# Patient Record
Sex: FEMALE | Race: WHITE | NOT HISPANIC OR LATINO | Employment: OTHER | URBAN - METROPOLITAN AREA
[De-identification: names, ages, dates, MRNs, and addresses within clinical notes are randomized per-mention and may not be internally consistent; named-entity substitution may affect disease eponyms.]

---

## 2023-11-02 ENCOUNTER — HOSPITAL ENCOUNTER (OUTPATIENT)
Facility: HOSPITAL | Age: 83
Setting detail: OBSERVATION
Discharge: NURSING, REHAB OR LONG-TERM CARE | End: 2023-11-02
Attending: EMERGENCY MEDICINE | Admitting: EMERGENCY MEDICINE
Payer: MEDICARE

## 2023-11-02 VITALS
RESPIRATION RATE: 18 BRPM | OXYGEN SATURATION: 98 % | BODY MASS INDEX: 22.82 KG/M2 | WEIGHT: 124 LBS | HEART RATE: 87 BPM | DIASTOLIC BLOOD PRESSURE: 53 MMHG | HEIGHT: 62 IN | SYSTOLIC BLOOD PRESSURE: 134 MMHG | TEMPERATURE: 98.4 F

## 2023-11-02 DIAGNOSIS — E11.39 TYPE 2 DIABETES MELLITUS WITH OTHER OPHTHALMIC COMPLICATION, UNSPECIFIED WHETHER LONG TERM INSULIN USE: ICD-10-CM

## 2023-11-02 DIAGNOSIS — H54.60 MONOCULAR VISION LOSS: Primary | ICD-10-CM

## 2023-11-02 PROBLEM — H54.62 VISION LOSS OF LEFT EYE: Status: ACTIVE | Noted: 2023-11-02

## 2023-11-02 LAB
ALBUMIN SERPL-MCNC: 4.3 G/DL (ref 3.5–5.2)
ALBUMIN/GLOB SERPL: 1.7 G/DL
ALP SERPL-CCNC: 70 U/L (ref 39–117)
ALT SERPL W P-5'-P-CCNC: 11 U/L (ref 1–33)
ANION GAP SERPL CALCULATED.3IONS-SCNC: 10.1 MMOL/L (ref 5–15)
AST SERPL-CCNC: 22 U/L (ref 1–32)
BASOPHILS # BLD AUTO: 0.07 10*3/MM3 (ref 0–0.2)
BASOPHILS NFR BLD AUTO: 0.8 % (ref 0–1.5)
BILIRUB SERPL-MCNC: 0.5 MG/DL (ref 0–1.2)
BUN SERPL-MCNC: 11 MG/DL (ref 8–23)
BUN/CREAT SERPL: 13.9 (ref 7–25)
CALCIUM SPEC-SCNC: 9.5 MG/DL (ref 8.6–10.5)
CHLORIDE SERPL-SCNC: 104 MMOL/L (ref 98–107)
CO2 SERPL-SCNC: 26.9 MMOL/L (ref 22–29)
CREAT SERPL-MCNC: 0.79 MG/DL (ref 0.57–1)
DEPRECATED RDW RBC AUTO: 42.7 FL (ref 37–54)
EGFRCR SERPLBLD CKD-EPI 2021: 75.3 ML/MIN/1.73
EOSINOPHIL # BLD AUTO: 0.19 10*3/MM3 (ref 0–0.4)
EOSINOPHIL NFR BLD AUTO: 2.1 % (ref 0.3–6.2)
ERYTHROCYTE [DISTWIDTH] IN BLOOD BY AUTOMATED COUNT: 13.1 % (ref 12.3–15.4)
GEN 5 2HR TROPONIN T REFLEX: 25 NG/L
GLOBULIN UR ELPH-MCNC: 2.5 GM/DL
GLUCOSE BLDC GLUCOMTR-MCNC: 225 MG/DL (ref 70–130)
GLUCOSE BLDC GLUCOMTR-MCNC: 295 MG/DL (ref 70–130)
GLUCOSE SERPL-MCNC: 150 MG/DL (ref 65–99)
HCT VFR BLD AUTO: 35.8 % (ref 34–46.6)
HGB BLD-MCNC: 11.7 G/DL (ref 12–15.9)
IMM GRANULOCYTES # BLD AUTO: 0.03 10*3/MM3 (ref 0–0.05)
IMM GRANULOCYTES NFR BLD AUTO: 0.3 % (ref 0–0.5)
LYMPHOCYTES # BLD AUTO: 1.83 10*3/MM3 (ref 0.7–3.1)
LYMPHOCYTES NFR BLD AUTO: 20.6 % (ref 19.6–45.3)
MCH RBC QN AUTO: 29.5 PG (ref 26.6–33)
MCHC RBC AUTO-ENTMCNC: 32.7 G/DL (ref 31.5–35.7)
MCV RBC AUTO: 90.2 FL (ref 79–97)
MONOCYTES # BLD AUTO: 0.58 10*3/MM3 (ref 0.1–0.9)
MONOCYTES NFR BLD AUTO: 6.5 % (ref 5–12)
NEUTROPHILS NFR BLD AUTO: 6.17 10*3/MM3 (ref 1.7–7)
NEUTROPHILS NFR BLD AUTO: 69.7 % (ref 42.7–76)
NRBC BLD AUTO-RTO: 0 /100 WBC (ref 0–0.2)
PLATELET # BLD AUTO: 187 10*3/MM3 (ref 140–450)
PMV BLD AUTO: 11.6 FL (ref 6–12)
POTASSIUM SERPL-SCNC: 4.1 MMOL/L (ref 3.5–5.2)
PROT SERPL-MCNC: 6.8 G/DL (ref 6–8.5)
QT INTERVAL: 387 MS
QTC INTERVAL: 444 MS
RBC # BLD AUTO: 3.97 10*6/MM3 (ref 3.77–5.28)
SODIUM SERPL-SCNC: 141 MMOL/L (ref 136–145)
TROPONIN T DELTA: 1 NG/L
TROPONIN T SERPL HS-MCNC: 24 NG/L
WBC NRBC COR # BLD: 8.87 10*3/MM3 (ref 3.4–10.8)

## 2023-11-02 PROCEDURE — 82948 REAGENT STRIP/BLOOD GLUCOSE: CPT

## 2023-11-02 PROCEDURE — 63710000001 METFORMIN 500 MG TABLET: Performed by: PHYSICIAN ASSISTANT

## 2023-11-02 PROCEDURE — 80053 COMPREHEN METABOLIC PANEL: CPT | Performed by: PHYSICIAN ASSISTANT

## 2023-11-02 PROCEDURE — 36415 COLL VENOUS BLD VENIPUNCTURE: CPT

## 2023-11-02 PROCEDURE — 99284 EMERGENCY DEPT VISIT MOD MDM: CPT

## 2023-11-02 PROCEDURE — 63710000001 INSULIN GLARGINE PER 5 UNITS: Performed by: PHYSICIAN ASSISTANT

## 2023-11-02 PROCEDURE — A9270 NON-COVERED ITEM OR SERVICE: HCPCS | Performed by: PHYSICIAN ASSISTANT

## 2023-11-02 PROCEDURE — 85025 COMPLETE CBC W/AUTO DIFF WBC: CPT | Performed by: PHYSICIAN ASSISTANT

## 2023-11-02 PROCEDURE — G0378 HOSPITAL OBSERVATION PER HR: HCPCS

## 2023-11-02 PROCEDURE — 84484 ASSAY OF TROPONIN QUANT: CPT | Performed by: PHYSICIAN ASSISTANT

## 2023-11-02 PROCEDURE — 93010 ELECTROCARDIOGRAM REPORT: CPT | Performed by: INTERNAL MEDICINE

## 2023-11-02 PROCEDURE — 63710000001 MEMANTINE 10 MG TABLET: Performed by: PHYSICIAN ASSISTANT

## 2023-11-02 PROCEDURE — 93005 ELECTROCARDIOGRAM TRACING: CPT | Performed by: PHYSICIAN ASSISTANT

## 2023-11-02 PROCEDURE — 63710000001 HYDRALAZINE 50 MG TABLET: Performed by: PHYSICIAN ASSISTANT

## 2023-11-02 PROCEDURE — 63710000001 DONEPEZIL 10 MG TABLET: Performed by: PHYSICIAN ASSISTANT

## 2023-11-02 PROCEDURE — 63710000001 INSULIN LISPRO (HUMAN) PER 5 UNITS: Performed by: PHYSICIAN ASSISTANT

## 2023-11-02 RX ORDER — TROPICAMIDE 10 MG/ML
1 SOLUTION/ DROPS OPHTHALMIC ONCE
Status: DISCONTINUED | OUTPATIENT
Start: 2023-11-02 | End: 2023-11-03 | Stop reason: HOSPADM

## 2023-11-02 RX ORDER — DEXTROSE MONOHYDRATE 25 G/50ML
25 INJECTION, SOLUTION INTRAVENOUS
Status: DISCONTINUED | OUTPATIENT
Start: 2023-11-02 | End: 2023-11-03 | Stop reason: HOSPADM

## 2023-11-02 RX ORDER — LOSARTAN POTASSIUM 100 MG/1
100 TABLET ORAL DAILY
COMMUNITY

## 2023-11-02 RX ORDER — NITROGLYCERIN 0.4 MG/1
0.4 TABLET SUBLINGUAL
Status: DISCONTINUED | OUTPATIENT
Start: 2023-11-02 | End: 2023-11-03 | Stop reason: HOSPADM

## 2023-11-02 RX ORDER — METOPROLOL SUCCINATE 100 MG/1
100 TABLET, EXTENDED RELEASE ORAL DAILY
COMMUNITY

## 2023-11-02 RX ORDER — SODIUM CHLORIDE 0.9 % (FLUSH) 0.9 %
10 SYRINGE (ML) INJECTION AS NEEDED
Status: DISCONTINUED | OUTPATIENT
Start: 2023-11-02 | End: 2023-11-03 | Stop reason: HOSPADM

## 2023-11-02 RX ORDER — ATORVASTATIN CALCIUM 20 MG/1
20 TABLET, FILM COATED ORAL DAILY
Status: DISCONTINUED | OUTPATIENT
Start: 2023-11-03 | End: 2023-11-03 | Stop reason: HOSPADM

## 2023-11-02 RX ORDER — MEMANTINE HYDROCHLORIDE 10 MG/1
10 TABLET ORAL 2 TIMES DAILY
Status: DISCONTINUED | OUTPATIENT
Start: 2023-11-02 | End: 2023-11-03 | Stop reason: HOSPADM

## 2023-11-02 RX ORDER — ONDANSETRON 4 MG/1
4 TABLET, FILM COATED ORAL EVERY 6 HOURS PRN
Status: DISCONTINUED | OUTPATIENT
Start: 2023-11-02 | End: 2023-11-03 | Stop reason: HOSPADM

## 2023-11-02 RX ORDER — INSULIN GLARGINE 100 [IU]/ML
25 INJECTION, SOLUTION SUBCUTANEOUS NIGHTLY
COMMUNITY

## 2023-11-02 RX ORDER — ATORVASTATIN CALCIUM 20 MG/1
20 TABLET, FILM COATED ORAL DAILY
COMMUNITY

## 2023-11-02 RX ORDER — AMLODIPINE BESYLATE 10 MG/1
10 TABLET ORAL DAILY
Status: DISCONTINUED | OUTPATIENT
Start: 2023-11-03 | End: 2023-11-03 | Stop reason: HOSPADM

## 2023-11-02 RX ORDER — SODIUM CHLORIDE 9 MG/ML
40 INJECTION, SOLUTION INTRAVENOUS AS NEEDED
Status: DISCONTINUED | OUTPATIENT
Start: 2023-11-02 | End: 2023-11-03 | Stop reason: HOSPADM

## 2023-11-02 RX ORDER — AMOXICILLIN 250 MG
2 CAPSULE ORAL DAILY PRN
COMMUNITY

## 2023-11-02 RX ORDER — MEMANTINE HYDROCHLORIDE 5 MG/1
10 TABLET ORAL 2 TIMES DAILY
COMMUNITY

## 2023-11-02 RX ORDER — DULAGLUTIDE 3 MG/.5ML
0.5 INJECTION, SOLUTION SUBCUTANEOUS WEEKLY
COMMUNITY

## 2023-11-02 RX ORDER — METOPROLOL SUCCINATE 50 MG/1
100 TABLET, EXTENDED RELEASE ORAL DAILY
Status: DISCONTINUED | OUTPATIENT
Start: 2023-11-03 | End: 2023-11-03 | Stop reason: HOSPADM

## 2023-11-02 RX ORDER — DONEPEZIL HYDROCHLORIDE 10 MG/1
10 TABLET, FILM COATED ORAL NIGHTLY
Status: DISCONTINUED | OUTPATIENT
Start: 2023-11-02 | End: 2023-11-03 | Stop reason: HOSPADM

## 2023-11-02 RX ORDER — POLYETHYLENE GLYCOL 3350 17 G/17G
17 POWDER, FOR SOLUTION ORAL DAILY
COMMUNITY

## 2023-11-02 RX ORDER — INSULIN LISPRO 100 [IU]/ML
2-7 INJECTION, SOLUTION INTRAVENOUS; SUBCUTANEOUS
Status: DISCONTINUED | OUTPATIENT
Start: 2023-11-02 | End: 2023-11-03 | Stop reason: HOSPADM

## 2023-11-02 RX ORDER — IBUPROFEN 600 MG/1
1 TABLET ORAL
Status: DISCONTINUED | OUTPATIENT
Start: 2023-11-02 | End: 2023-11-03 | Stop reason: HOSPADM

## 2023-11-02 RX ORDER — HYDRALAZINE HYDROCHLORIDE 50 MG/1
50 TABLET, FILM COATED ORAL 3 TIMES DAILY
Status: DISCONTINUED | OUTPATIENT
Start: 2023-11-02 | End: 2023-11-03 | Stop reason: HOSPADM

## 2023-11-02 RX ORDER — LOSARTAN POTASSIUM 100 MG/1
100 TABLET ORAL DAILY
Status: DISCONTINUED | OUTPATIENT
Start: 2023-11-03 | End: 2023-11-03 | Stop reason: HOSPADM

## 2023-11-02 RX ORDER — DONEPEZIL HYDROCHLORIDE 10 MG/1
10 TABLET, FILM COATED ORAL NIGHTLY
COMMUNITY

## 2023-11-02 RX ORDER — INSULIN GLARGINE 100 [IU]/ML
25 INJECTION, SOLUTION SUBCUTANEOUS NIGHTLY
Status: DISCONTINUED | OUTPATIENT
Start: 2023-11-02 | End: 2023-11-02 | Stop reason: CLARIF

## 2023-11-02 RX ORDER — PHENYLEPHRINE HYDROCHLORIDE 25 MG/ML
1 SOLUTION/ DROPS OPHTHALMIC ONCE
Status: DISCONTINUED | OUTPATIENT
Start: 2023-11-02 | End: 2023-11-03 | Stop reason: HOSPADM

## 2023-11-02 RX ORDER — ONDANSETRON 2 MG/ML
4 INJECTION INTRAMUSCULAR; INTRAVENOUS EVERY 6 HOURS PRN
Status: DISCONTINUED | OUTPATIENT
Start: 2023-11-02 | End: 2023-11-03 | Stop reason: HOSPADM

## 2023-11-02 RX ORDER — SODIUM CHLORIDE 1 G/1
2 TABLET ORAL 3 TIMES DAILY
COMMUNITY

## 2023-11-02 RX ORDER — SODIUM CHLORIDE 0.9 % (FLUSH) 0.9 %
10 SYRINGE (ML) INJECTION EVERY 12 HOURS SCHEDULED
Status: DISCONTINUED | OUTPATIENT
Start: 2023-11-02 | End: 2023-11-03 | Stop reason: HOSPADM

## 2023-11-02 RX ORDER — ACETAMINOPHEN 325 MG/1
650 TABLET ORAL EVERY 4 HOURS PRN
Status: DISCONTINUED | OUTPATIENT
Start: 2023-11-02 | End: 2023-11-03 | Stop reason: HOSPADM

## 2023-11-02 RX ORDER — AMLODIPINE BESYLATE 10 MG/1
10 TABLET ORAL DAILY
COMMUNITY

## 2023-11-02 RX ORDER — NICOTINE POLACRILEX 4 MG
15 LOZENGE BUCCAL
Status: DISCONTINUED | OUTPATIENT
Start: 2023-11-02 | End: 2023-11-03 | Stop reason: HOSPADM

## 2023-11-02 RX ORDER — HYDRALAZINE HYDROCHLORIDE 50 MG/1
50 TABLET, FILM COATED ORAL 3 TIMES DAILY
COMMUNITY

## 2023-11-02 RX ADMIN — METFORMIN HYDROCHLORIDE 1000 MG: 500 TABLET, FILM COATED ORAL at 18:25

## 2023-11-02 RX ADMIN — MEMANTINE HYDROCHLORIDE 10 MG: 10 TABLET, FILM COATED ORAL at 21:13

## 2023-11-02 RX ADMIN — INSULIN GLARGINE 25 UNITS: 100 INJECTION, SOLUTION SUBCUTANEOUS at 21:21

## 2023-11-02 RX ADMIN — DONEPEZIL HYDROCHLORIDE 10 MG: 10 TABLET, FILM COATED ORAL at 21:13

## 2023-11-02 RX ADMIN — Medication 10 ML: at 21:13

## 2023-11-02 RX ADMIN — INSULIN LISPRO 3 UNITS: 100 INJECTION, SOLUTION INTRAVENOUS; SUBCUTANEOUS at 21:21

## 2023-11-02 RX ADMIN — INSULIN LISPRO 4 UNITS: 100 INJECTION, SOLUTION INTRAVENOUS; SUBCUTANEOUS at 18:24

## 2023-11-02 RX ADMIN — HYDRALAZINE HYDROCHLORIDE 50 MG: 50 TABLET, FILM COATED ORAL at 18:25

## 2023-11-02 RX ADMIN — HYDRALAZINE HYDROCHLORIDE 50 MG: 50 TABLET, FILM COATED ORAL at 22:18

## 2023-11-02 NOTE — ED NOTES
From [facility] for C/O unable to see out of LT eye since yesterday. Elevated BP today at facility 174/76.

## 2023-11-02 NOTE — CASE MANAGEMENT/SOCIAL WORK
Patient with legal guardian on file and is a [place]. Registration contacted patient's legal guardian, Dorys Elizabeth, and obtained verbal consent to treat over the phone. No paperwork has been scanned into Epic, so I have requested the guardianship paperwork be e-mailed to me by Dorys Johnson, who states that she will do so. Banner is present upon chart review. Indira MARROQUIN RN CCP manager, updated per policy. I will contact Dorys Johnson (legal guardian) with patient's disposition from the ER today. NAA YangN RN

## 2023-11-02 NOTE — PROGRESS NOTES
Clinical Pharmacy Services: Medication History    Kecia Martinze is a 83 y.o. female presenting to [facility] for   Chief Complaint   Patient presents with    Loss of Vision       She  has no past medical history on file.    Allergies as of 11/02/2023 - Reviewed 11/02/2023   Allergen Reaction Noted    Penicillins Unknown - High Severity 11/02/2023    Sulfa antibiotics Unknown - High Severity 11/02/2023    Sulfanilamide Unknown - High Severity 11/02/2023       Medication information was obtained from: Nursing Home   Pharmacy and Phone Number:     Prior to Admission Medications       Prescriptions Last Dose Informant Patient Reported? Taking?    amLODIPine (NORVASC) 10 MG tablet  Nursing Home Yes Yes    Take 1 tablet by mouth Daily.    atorvastatin (LIPITOR) 20 MG tablet  Nursing Home Yes Yes    Take 1 tablet by mouth Daily.    donepezil (ARICEPT) 10 MG tablet  Nursing Home Yes Yes    Take 1 tablet by mouth Every Night.    Dulaglutide (Trulicity) 3 MG/0.5ML solution pen-injector  Nursing Home Yes Yes    Inject 0.5 mL under the skin into the appropriate area as directed 1 (One) Time Per Week. Tuesdays    hydrALAZINE (APRESOLINE) 50 MG tablet  Nursing Home Yes Yes    Take 1 tablet by mouth 3 (Three) Times a Day.    Insulin Glargine (BASAGLAR KWIKPEN) 100 UNIT/ML injection pen  Nursing Home Yes Yes    Inject 25 Units under the skin into the appropriate area as directed Every Night.    losartan (COZAAR) 100 MG tablet  Nursing Home Yes Yes    Take 1 tablet by mouth Daily.    magnesium hydroxide (MILK OF MAGNESIA) 400 MG/5ML suspension  Nursing Home Yes Yes    Take 30 mL by mouth Daily As Needed for Constipation.    memantine (NAMENDA) 5 MG tablet  Nursing Home Yes Yes    Take 2 tablets by mouth 2 (Two) Times a Day.    metFORMIN (GLUCOPHAGE) 1000 MG tablet  Nursing Home Yes Yes    Take 1 tablet by mouth 2 (Two) Times a Day With Meals.    metoprolol succinate XL (TOPROL-XL) 100 MG 24 hr tablet  Nursing Home Yes  Yes    Take 1 tablet by mouth Daily.    polyethylene glycol (MIRALAX) 17 g packet  Nursing Home Yes Yes    Take 17 g by mouth Daily.    sennosides-docusate (senna-docusate sodium) 8.6-50 MG per tablet  Nursing Home Yes Yes    Take 2 tablets by mouth Daily As Needed for Constipation.    sodium chloride 1 g tablet  Nursing Home Yes Yes    Take 2 tablets by mouth 3 (Three) Times a Day.              Medication notes:     This medication list is complete to the best of my knowledge as of 11/2/2023    Please call if questions.    Romero Spring  Medication History Technician  [phone]    11/2/2023 11:57 EDT

## 2023-11-02 NOTE — CASE MANAGEMENT/SOCIAL WORK
Contacted [facility] dispatch to arrange ambulance transport back to [facility]. Spoke with Manish, who provides ETA of 2300. I contacted the After Hours Guardianship Office (spoke with Emilia Bejarano) to inform of discharge via [facility] back to patient's facility. They verbalize consent. FILOMENA Lance, NAAN RN

## 2023-11-02 NOTE — ED PROVIDER NOTES
EMERGENCY DEPARTMENT ENCOUNTER    Room Number:  109/1  Date of encounter:  11/2/2023  PCP: Ministerio Armas MD  Historian: Patient  Chronic or social conditions impacting care (social determinants of health): Full code from nursing home    HPI:  Chief Complaint: Left eye vision loss  A complete HPI/ROS/PMH/PSH/SH/FH are unobtainable due to: Nothing    Context: Kecia Martinez is a 83 y.o. female who presents to the ED c/o fairly sudden, painless left vision loss starting yesterday.  Patient is a demented nursing home resident.  She is a poor historian.  She reports losing partial vision in her left eye yesterday.  She denies any pain.  She can still see shadows and lights to some degree.  She denies any unilateral weakness, dysarthria, aphasia.  She does have a history of diabetes.  No blood thinners.    Review of prior external notes (non-ED):   None    Review of prior external test results outside of this encounter:  None    PAST MEDICAL HISTORY  Active Ambulatory Problems     Diagnosis Date Noted    No Active Ambulatory Problems     Resolved Ambulatory Problems     Diagnosis Date Noted    No Resolved Ambulatory Problems     No Additional Past Medical History         PAST SURGICAL HISTORY  History reviewed. No pertinent surgical history.      FAMILY HISTORY  History reviewed. No pertinent family history.      SOCIAL HISTORY  Social History     Socioeconomic History    Marital status:    Tobacco Use    Smoking status: Unknown     Passive exposure: Past   Vaping Use    Vaping Use: Unknown   Substance and Sexual Activity    Alcohol use: Defer    Drug use: Defer    Sexual activity: Defer         ALLERGIES  Penicillins, Sulfa antibiotics, and Sulfanilamide        REVIEW OF SYSTEMS  All systems reviewed and negative except for those discussed in HPI.       PHYSICAL EXAM    I have reviewed the triage vital signs and nursing notes.    ED Triage Vitals [11/02/23 0947]   Temp Heart Rate Resp BP SpO2   98.4 °F (36.9 °C)  81 16 118/57 97 %      Temp src Heart Rate Source Patient Position BP Location FiO2 (%)   Oral -- -- -- --       Physical Exam  GENERAL: Alert, oriented to self, pleasantly confused, not distressed  HENT: head atraumatic, no nuchal rigidity  EYES: Right pupil reactive and clear.  Left pupil slightly opaque and sluggish.  Extraocular movements intact.  Decreased gross visual acuity in the left eye.  Patient only able to see shadows and lights.  CV: regular rhythm, regular rate, no murmur  RESPIRATORY: normal effort, CTA  ABDOMEN: soft, nontender  MUSCULOSKELETAL: no deformity, FROM, no calf swelling or tenderness  NEURO: alert, moves all extremities, follows commands  SKIN: warm, dry        LAB RESULTS  Recent Results (from the past 24 hour(s))   Comprehensive Metabolic Panel    Collection Time: 11/02/23 10:11 AM    Specimen: Blood   Result Value Ref Range    Glucose 150 (H) 65 - 99 mg/dL    BUN 11 8 - 23 mg/dL    Creatinine 0.79 0.57 - 1.00 mg/dL    Sodium 141 136 - 145 mmol/L    Potassium 4.1 3.5 - 5.2 mmol/L    Chloride 104 98 - 107 mmol/L    CO2 26.9 22.0 - 29.0 mmol/L    Calcium 9.5 8.6 - 10.5 mg/dL    Total Protein 6.8 6.0 - 8.5 g/dL    Albumin 4.3 3.5 - 5.2 g/dL    ALT (SGPT) 11 1 - 33 U/L    AST (SGOT) 22 1 - 32 U/L    Alkaline Phosphatase 70 39 - 117 U/L    Total Bilirubin 0.5 0.0 - 1.2 mg/dL    Globulin 2.5 gm/dL    A/G Ratio 1.7 g/dL    BUN/Creatinine Ratio 13.9 7.0 - 25.0    Anion Gap 10.1 5.0 - 15.0 mmol/L    eGFR 75.3 >60.0 mL/min/1.73   High Sensitivity Troponin T    Collection Time: 11/02/23 10:11 AM    Specimen: Blood   Result Value Ref Range    HS Troponin T 24 (H) <10 ng/L   CBC Auto Differential    Collection Time: 11/02/23 10:11 AM    Specimen: Blood   Result Value Ref Range    WBC 8.87 3.40 - 10.80 10*3/mm3    RBC 3.97 3.77 - 5.28 10*6/mm3    Hemoglobin 11.7 (L) 12.0 - 15.9 g/dL    Hematocrit 35.8 34.0 - 46.6 %    MCV 90.2 79.0 - 97.0 fL    MCH 29.5 26.6 - 33.0 pg    MCHC 32.7 31.5 - 35.7 g/dL     RDW 13.1 12.3 - 15.4 %    RDW-SD 42.7 37.0 - 54.0 fl    MPV 11.6 6.0 - 12.0 fL    Platelets 187 140 - 450 10*3/mm3    Neutrophil % 69.7 42.7 - 76.0 %    Lymphocyte % 20.6 19.6 - 45.3 %    Monocyte % 6.5 5.0 - 12.0 %    Eosinophil % 2.1 0.3 - 6.2 %    Basophil % 0.8 0.0 - 1.5 %    Immature Grans % 0.3 0.0 - 0.5 %    Neutrophils, Absolute 6.17 1.70 - 7.00 10*3/mm3    Lymphocytes, Absolute 1.83 0.70 - 3.10 10*3/mm3    Monocytes, Absolute 0.58 0.10 - 0.90 10*3/mm3    Eosinophils, Absolute 0.19 0.00 - 0.40 10*3/mm3    Basophils, Absolute 0.07 0.00 - 0.20 10*3/mm3    Immature Grans, Absolute 0.03 0.00 - 0.05 10*3/mm3    nRBC 0.0 0.0 - 0.2 /100 WBC   ECG 12 Lead Stroke Evaluation    Collection Time: 11/02/23 10:16 AM   Result Value Ref Range    QT Interval 387 ms    QTC Interval 444 ms   High Sensitivity Troponin T 2Hr    Collection Time: 11/02/23 12:10 PM    Specimen: Blood   Result Value Ref Range    HS Troponin T 25 (H) <10 ng/L    Troponin T Delta 1 >=-4 - <+4 ng/L       Ordered the above labs and independently reviewed the results.    MEDICATIONS GIVEN IN ER    Medications   sodium chloride 0.9 % flush 10 mL (has no administration in time range)   phenylephrine (MYDFRIN) 2.5 % ophthalmic solution 1 drop (has no administration in time range)   tropicamide (MYDRIACYL) 1 % ophthalmic solution 1 drop (has no administration in time range)   sodium chloride 0.9 % flush 10 mL (has no administration in time range)   sodium chloride 0.9 % flush 10 mL (has no administration in time range)   sodium chloride 0.9 % infusion 40 mL (has no administration in time range)   ondansetron (ZOFRAN) tablet 4 mg (has no administration in time range)     Or   ondansetron (ZOFRAN) injection 4 mg (has no administration in time range)   nitroglycerin (NITROSTAT) SL tablet 0.4 mg (has no administration in time range)   acetaminophen (TYLENOL) tablet 650 mg (has no administration in time range)         ADDITIONAL ORDERS CONSIDERED BUT NOT  ORDERED:  Nothing      PROGRESS, DATA ANALYSIS, CONSULTS, AND MEDICAL DECISION MAKING    All labs have been independently interpreted by myself.  All radiology studies have been independently interpreted by myself and discussed with radiologist dictating the report.   EKG's independently interpreted by myself.  Discussion below represents my analysis of pertinent findings related to patient's condition, differential diagnosis, treatment plan and final disposition.    I have discussed case with Dr. Aldana, emergency room physician.  He has performed his own bedside examination and agrees with treatment plan.    ED Course as of 11/02/23 1553   Thu Nov 02, 2023   1004 Patient presents with near complete vision loss in the left eye starting yesterday.  No other focal deficits.  Patient has history of dementia.  No blood thinners.  She is a full code.  Differential diagnoses include but not limited to retinal artery occlusion, retinal detachment, cataracts. [EE]   1027 EKG independently interpreted by myself.  Time 10:16 AM.  Sinus rhythm.  Heart rate 79.  Prolonged R wave progression. [TD]   1043 WBC: 8.87 [EE]   1043 Creatinine: 0.79 [EE]   1043 Glucose(!): 150 [EE]   1043 I plan to discuss with ophthalmology. [EE]   1135 I discussed the case with Dr. Rider, ophthalmology.  He will come to the ER this evening to see the patient.  I will place the patient in observation until then.    I discussed the patient with Christie Garcia, physician assistant in the observation unit.  She agrees to admit. [EE]      ED Course User Index  [EE] Santiago Jang PA  [TD] Reed Aldana II, MD       AS OF 15:53 EDT VITALS:    BP - 160/66  HR - 95  TEMP - 98.4 °F (36.9 °C) (Oral)  O2 SATS - 100%        DIAGNOSIS  Final diagnoses:   Monocular vision loss   Type 2 diabetes mellitus with other ophthalmic complication, unspecified whether long term insulin use         DISPOSITION  Admitted      Dictated utilizing Dragon dictation      Santiago Jang PA  11/02/23 1553

## 2023-11-02 NOTE — ED PROVIDER NOTES
MD ATTESTATION NOTE    The SUSU and I have discussed this patient's history, physical exam, and treatment plan.    I provided a substantive portion of the care of this patient. I personally performed the physical exam, in its entirety. The attached note describes my personal findings.      Kecia Martinez is a 83 y.o. female who presents to the ED c/o left eye vision loss.  She states that it started abruptly 2 days ago.  No pain.      On exam:  GENERAL: not distressed  HENT: nares patent  EYES: no scleral icterus, unable to visualize left retina due to cloudiness  CV: regular rhythm, regular rate  RESPIRATORY: normal effort  ABDOMEN: soft  MUSCULOSKELETAL: no deformity  NEURO: alert, moves all extremities, follows commands  SKIN: warm, dry    Labs  Recent Results (from the past 24 hour(s))   Comprehensive Metabolic Panel    Collection Time: 11/02/23 10:11 AM    Specimen: Blood   Result Value Ref Range    Glucose 150 (H) 65 - 99 mg/dL    BUN 11 8 - 23 mg/dL    Creatinine 0.79 0.57 - 1.00 mg/dL    Sodium 141 136 - 145 mmol/L    Potassium 4.1 3.5 - 5.2 mmol/L    Chloride 104 98 - 107 mmol/L    CO2 26.9 22.0 - 29.0 mmol/L    Calcium 9.5 8.6 - 10.5 mg/dL    Total Protein 6.8 6.0 - 8.5 g/dL    Albumin 4.3 3.5 - 5.2 g/dL    ALT (SGPT) 11 1 - 33 U/L    AST (SGOT) 22 1 - 32 U/L    Alkaline Phosphatase 70 39 - 117 U/L    Total Bilirubin 0.5 0.0 - 1.2 mg/dL    Globulin 2.5 gm/dL    A/G Ratio 1.7 g/dL    BUN/Creatinine Ratio 13.9 7.0 - 25.0    Anion Gap 10.1 5.0 - 15.0 mmol/L    eGFR 75.3 >60.0 mL/min/1.73   High Sensitivity Troponin T    Collection Time: 11/02/23 10:11 AM    Specimen: Blood   Result Value Ref Range    HS Troponin T 24 (H) <10 ng/L   CBC Auto Differential    Collection Time: 11/02/23 10:11 AM    Specimen: Blood   Result Value Ref Range    WBC 8.87 3.40 - 10.80 10*3/mm3    RBC 3.97 3.77 - 5.28 10*6/mm3    Hemoglobin 11.7 (L) 12.0 - 15.9 g/dL    Hematocrit 35.8 34.0 - 46.6 %    MCV 90.2 79.0 - 97.0 fL    MCH 29.5 26.6  - 33.0 pg    MCHC 32.7 31.5 - 35.7 g/dL    RDW 13.1 12.3 - 15.4 %    RDW-SD 42.7 37.0 - 54.0 fl    MPV 11.6 6.0 - 12.0 fL    Platelets 187 140 - 450 10*3/mm3    Neutrophil % 69.7 42.7 - 76.0 %    Lymphocyte % 20.6 19.6 - 45.3 %    Monocyte % 6.5 5.0 - 12.0 %    Eosinophil % 2.1 0.3 - 6.2 %    Basophil % 0.8 0.0 - 1.5 %    Immature Grans % 0.3 0.0 - 0.5 %    Neutrophils, Absolute 6.17 1.70 - 7.00 10*3/mm3    Lymphocytes, Absolute 1.83 0.70 - 3.10 10*3/mm3    Monocytes, Absolute 0.58 0.10 - 0.90 10*3/mm3    Eosinophils, Absolute 0.19 0.00 - 0.40 10*3/mm3    Basophils, Absolute 0.07 0.00 - 0.20 10*3/mm3    Immature Grans, Absolute 0.03 0.00 - 0.05 10*3/mm3    nRBC 0.0 0.0 - 0.2 /100 WBC   ECG 12 Lead Stroke Evaluation    Collection Time: 11/02/23 10:16 AM   Result Value Ref Range    QT Interval 387 ms    QTC Interval 444 ms       Radiology  No Radiology Exams Resulted Within Past 24 Hours    Medications given in the ED:  Medications   sodium chloride 0.9 % flush 10 mL (has no administration in time range)   phenylephrine (MYDFRIN) 2.5 % ophthalmic solution 1 drop (has no administration in time range)   tropicamide (MYDRIACYL) 1 % ophthalmic solution 1 drop (has no administration in time range)   sodium chloride 0.9 % flush 10 mL (has no administration in time range)   sodium chloride 0.9 % flush 10 mL (has no administration in time range)   sodium chloride 0.9 % infusion 40 mL (has no administration in time range)   ondansetron (ZOFRAN) tablet 4 mg (has no administration in time range)     Or   ondansetron (ZOFRAN) injection 4 mg (has no administration in time range)   nitroglycerin (NITROSTAT) SL tablet 0.4 mg (has no administration in time range)   acetaminophen (TYLENOL) tablet 650 mg (has no administration in time range)       Orders placed during this visit:  Orders Placed This Encounter   Procedures    Comprehensive Metabolic Panel    High Sensitivity Troponin T    CBC Auto Differential    High Sensitivity  Troponin T 2Hr    CBC (No Diff)    Basic Metabolic Panel    Diet: Cardiac Diets; Healthy Heart (2-3 Na+); Texture: Regular Texture (IDDSI 7); Fluid Consistency: Thin (IDDSI 0)    Intake & Output    Weigh Patient    Telemetry - Maintain IV Access    Telemetry - Place Orders & Notify Provider of Results When Patient Experiences Acute Chest Pain, Dysrhythmia or Respiratory Distress    May Be Off Telemetry for Tests    Vital Signs    Code Status and Medical Interventions:    Ophthalmology (on-call MD unless specified)    Inpatient Ophthalmology Consult    ECG 12 Lead Stroke Evaluation    Insert Peripheral IV    Insert Peripheral IV    Initiate ED Observation Status    CBC & Differential       Medical Decision Making:  ED Course as of 11/02/23 1223   Thu Nov 02, 2023   1004 Patient presents with near complete vision loss in the left eye starting yesterday.  No other focal deficits.  Patient has history of dementia.  No blood thinners.  She is a full code.  Differential diagnoses include but not limited to retinal artery occlusion, retinal detachment, cataracts. [EE]   1027 EKG independently interpreted by myself.  Time 10:16 AM.  Sinus rhythm.  Heart rate 79.  Prolonged R wave progression. [TD]   1043 WBC: 8.87 [EE]   1043 Creatinine: 0.79 [EE]   1043 Glucose(!): 150 [EE]   1043 I plan to discuss with ophthalmology. [EE]   1135 I discussed the case with Dr. Rider, ophthalmology.  He will come to the ER this evening to see the patient.  I will place the patient in observation until then.    I discussed the patient with Christie Garcia, physician assistant in the observation unit.  She agrees to admit. [EE]      ED Course User Index  [EE] Santiago Jang PA  [TD] Reed Aldana II, MD         Patient my exam, most patients that she has cataract in the left eye.  However, she reports having rather abrupt change in her vision.  It is possible she did not notice it until 2 days ago.  Nonetheless, I do think she would  benefit from ophthalmology evaluation to evaluate for other causes of acute monocular vision loss.  However, this does not seem consistent with CRAO given that she does have blurry vision rather than black vision.    Diagnosis  Final diagnoses:   Monocular vision loss   Type 2 diabetes mellitus with other ophthalmic complication, unspecified whether long term insulin use          Reed Aldana II, MD  11/03/23 6859

## 2023-11-02 NOTE — H&P
[facility]   HISTORY AND PHYSICAL    Patient Name: Kecia Martinez  : 1940  MRN: [id number]  Primary Care Physician:  Ministerio Armas MD  Date of admission: 2023    Subjective   Subjective     Chief Complaint:   Chief Complaint   Patient presents with    Loss of Vision         HPI:    Kecia Martinez is a 83 y.o. female with hypertension, hyperlipidemia, diabetes, and dementia, who presented to the emergency department today sent from her nursing facility for painless vision loss in her left eye.  Patient is a poor historian due to her dementia.  Caregiver is at the bedside who states that she saw her Tuesday morning and patient was fine.  Sometime between Tuesday morning and today, patient reportedly lost vision in left eye.  Patient states she is able to see some shadows but nothing with any detail.    ED provider spoke to ophthalmologist who reported he would be able to examine patient after his clinic.  He will reportedly be here at 6 PM to examine patient.    Review of Systems   All systems were reviewed and negative except for: Unable to complete ROS due to dementia    Personal History     History reviewed. No pertinent past medical history.    History reviewed. No pertinent surgical history.    Family History: family history is not on file. Otherwise pertinent FHx was reviewed and not pertinent to current issue.    Social History: Alcohol use questions deferred to the physician. Drug use questions deferred to the physician.    Home Medications:  BASAGLAR KWIKPEN, Dulaglutide, amLODIPine, atorvastatin, donepezil, hydrALAZINE, losartan, magnesium hydroxide, memantine, metFORMIN, metoprolol succinate XL, polyethylene glycol, sennosides-docusate, and sodium chloride    Allergies:  Allergies   Allergen Reactions    Penicillins Unknown - High Severity    Sulfa Antibiotics Unknown - High Severity    Sulfanilamide Unknown - High Severity       Objective   Objective     Vitals:   Temp:  [98.4 °F (36.9 °C)]  98.4 °F (36.9 °C)  Heart Rate:  [69-95] 95  Resp:  [16-18] 18  BP: (118-160)/(57-70) 160/66  Physical Exam     GENERAL: 83 y.o. YO female a/o x 1, NAD  SKIN: Warm pink and dry   HEENT:  PERRL, EOM intact, left eye appears cloudy compared to right, right has ocular implant, patient unable to see fingers in front of her face with left eye monocular vision  NECK: supple, no JVD  LUNGS: Clear to auscultation bilaterally without wheezes, rales or rhonchi.  No accessory muscle use and no nasal flaring.  CARDIAC:  Regular rate and rhythm, S1-S2.  No murmurs, rubs or gallops.  No peripheral edema.  Equal pulses bilaterally.  ABDOMEN: Soft, nontender, nondistended.  No guarding or rebound tenderness.  Normal bowel sounds.  MUSCULOSKELETAL: Moves all extremities well.  No deformity.  NEURO: Cranial nerves II through XII grossly intact.  No gross focal deficits.  Alert.  Normal speech and motor.  PSYCH: Normal mood and affect      Result Review    Result Review:  I have personally reviewed the results from the time of this admission to 11/2/2023 16:22 EDT and agree with these findings:  [x]  Laboratory list / accordion  []  Microbiology  []  Radiology  []  EKG/Telemetry   []  Cardiology/Vascular   []  Pathology  []  Old records  []  Other:  Most notable findings include: High-sensitivity troponin 24, 25 glucose 150, hemoglobin 11.7, otherwise laboratory evaluation unremarkable      Assessment & Plan   Assessment / Plan     Brief Patient Summary:  Kecia Martinez is a 83 y.o. female who is being admitted to observation unit for further evaluation of left eye vision loss    Active Hospital Problems:  Active Hospital Problems    Diagnosis     **Vision loss of left eye      Plan:     Painless vision loss left eye  -Left eye appears cloudy, question if this is cataract the patient only recently noticed  -Consult ophthalmology  -Patient reports that she does not want surgery on her eye if needed  -Monitor for now    Diabetes  -With  long-term use of insulin   -Accu-Checks 4 times daily with meals and at bedtime   -Low dose correctional factor with hypoglycemic protocol    Dementia  -Continue Namenda and Aricept    Hypertension  -Continue amlodipine, losartan, metoprolol, hydralazine    Hyperlipidemia  -Continue statin    DVT prophylaxis:  Mechanical DVT prophylaxis orders are present.    CODE STATUS:    Level Of Support Discussed With: Patient  Code Status (Patient has no pulse and is not breathing): CPR (Attempt to Resuscitate)  Medical Interventions (Patient has pulse or is breathing): Full Support    Admission Status:  I believe this patient meets observation status.     75 minutes has been spent by [facility] providers in the care of this patient while under observation status    I wore an appropriate PPE during this patient encounter.  Hand hygeine performed before and after seeing the patient.    Electronically signed by FRANCINE Tarango, 11/02/23, 4:22 PM EDT.

## 2023-11-02 NOTE — CONSULTS
[facility]   Consult Note    Patient Name: Kecia Martinez  : 1940  MRN: [id number]  Primary Care Physician:  Ministerio Armas MD  Referring Physician: Avel Ruvalcaba MD  Date of admission: 2023    Consults  Subjective   Subjective     Reason for Consult/ Chief Complaint: Poor vision in the left eye     History of Present Illness  Kecia Martinez is a 83 y.o. female with a history of subacute vision loss in the left eye.  The patient is accompanied by her daughter who states that her memory of events is poor and unreliable.  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  Her daughter states she has had cataract surgery in her right eye in 2016.    Review of Systems     Personal History     History reviewed. No pertinent past medical history.    History reviewed. No pertinent surgical history.    Family History: family history is not on file. Otherwise pertinent FHx was reviewed and not pertinent to current issue.    Social History: Alcohol use questions deferred to the physician. Drug use questions deferred to the physician.    Home Medications:   BASAGLAR KWIKPEN, Dulaglutide, amLODIPine, atorvastatin, donepezil, hydrALAZINE, losartan, magnesium hydroxide, memantine, metFORMIN, metoprolol succinate XL, polyethylene glycol, sennosides-docusate, and sodium chloride    Allergies:  Allergies   Allergen Reactions    Penicillins Unknown - High Severity    Sulfa Antibiotics Unknown - High Severity    Sulfanilamide Unknown - High Severity       Objective    Objective     Vitals:  Temp:  [98.4 °F (36.9 °C)] 98.4 °F (36.9 °C)  Heart Rate:  [69-95] 95  Resp:  [16-18] 18  BP: (118-160)/(57-70) 160/66    Physical Exam    Near Vision without correction is count-fingers OD and light-perception OS.  The ductions are full and she is orthophoric.  She is unable to perform confrontational visual field testing.  The anterior exam shows normal  conjunctiva, corneas, anterior chamber and irides are normal OU.  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  The posterior segment exam was difficult but no abnormalities were seen OD and no view of the posterior segment was possible.    Assessment & Plan   Assessment / Plan     Brief Patient Summary:  Kecia Martinez is a 83 y.o. female who discovered her left blind eye due to cataract and possible other pathology.    Active Hospital Problems:  Active Hospital Problems    Diagnosis     **Vision loss of left eye      Plan:   Follow up as an outpatient.  Her daughter was given my office contact information.      Robert Rider MD

## 2023-11-02 NOTE — CASE MANAGEMENT/SOCIAL WORK
Received guardianship paperwork from Dorys Johnson (legal guardian) via e-mail and have had them scanned into Epic by registration. NAA YangN RN

## 2023-11-02 NOTE — ED NOTES
.Nursing report ED to floor  Kecia Martinez  83 y.o.  female    HPI :   Chief Complaint   Patient presents with    Loss of Vision       Admitting doctor:   Avel Ruvalcaba MD    Admitting diagnosis:   The primary encounter diagnosis was Monocular vision loss. A diagnosis of Type 2 diabetes mellitus with other ophthalmic complication, unspecified whether long term insulin use was also pertinent to this visit.    Code status:   Current Code Status       Date Active Code Status Order ID Comments User Context       11/2/2023 1154 CPR (Attempt to Resuscitate) [id number]  Christie Garcia PA ED        Question Answer    Code Status (Patient has no pulse and is not breathing) CPR (Attempt to Resuscitate)    Medical Interventions (Patient has pulse or is breathing) Full Support    Level Of Support Discussed With Patient                    Allergies:   Penicillins, Sulfa antibiotics, and Sulfanilamide    Isolation:   No active isolations    Intake and Output  No intake or output data in the 24 hours ending 11/02/23 1159    Weight:       11/02/23  0958   Weight: 58.7 kg (129 lb 4.8 oz)       Most recent vitals:   Vitals:    11/02/23 1131 11/02/23 1132 11/02/23 1133 11/02/23 1134   BP: 149/59      Pulse: 70 84 77 70   Resp:       Temp:       TempSrc:       SpO2: 96% 100% 96% 97%   Weight:       Height:           Active LDAs/IV Access:   Lines, Drains & Airways       Active LDAs       Name Placement date Placement time Site Days    Peripheral IV 11/02/23 1012 Left Antecubital 11/02/23  1012  Antecubital  less than 1                    Labs (abnormal labs have a star):   Labs Reviewed   COMPREHENSIVE METABOLIC PANEL - Abnormal; Notable for the following components:       Result Value    Glucose 150 (*)     All other components within normal limits    Narrative:     GFR Normal >60  Chronic Kidney Disease <60  Kidney Failure <15    The GFR formula is only valid for adults with stable renal function between ages 18 and 70.   TROPONIN -  Abnormal; Notable for the following components:    HS Troponin T 24 (*)     All other components within normal limits    Narrative:     High Sensitive Troponin T Reference Range:  <10.0 ng/L- Negative Female for AMI  <15.0 ng/L- Negative Male for AMI  >=10 - Abnormal Female indicating possible myocardial injury.  >=15 - Abnormal Male indicating possible myocardial injury.   Clinicians would have to utilize clinical acumen, EKG, Troponin, and serial changes to determine if it is an Acute Myocardial Infarction or myocardial injury due to an underlying chronic condition.        CBC WITH AUTO DIFFERENTIAL - Abnormal; Notable for the following components:    Hemoglobin 11.7 (*)     All other components within normal limits   HIGH SENSITIVITIY TROPONIN T 2HR   CBC AND DIFFERENTIAL    Narrative:     The following orders were created for panel order CBC & Differential.  Procedure                               Abnormality         Status                     ---------                               -----------         ------                     CBC Auto Differential[[id number]]        Abnormal            Final result                 Please view results for these tests on the individual orders.       EKG:   ECG 12 Lead Stroke Evaluation   Preliminary Result   HEART RATE= 79  bpm   RR Interval= 759  ms   UT Interval= 137  ms   P Horizontal Axis= 75  deg   P Front Axis= 0  deg   QRSD Interval= 90  ms   QT Interval= 387  ms   QTcB= 444  ms   QRS Axis= -46  deg   T Wave Axis= 52  deg   - ABNORMAL ECG -   Sinus rhythm   Inferior infarct, old   Anterior infarct, old   Lateral leads are also involved   Electronically Signed By:    Date and Time of Study: 2023-11-02 10:16:22          Meds given in ED:   Medications   sodium chloride 0.9 % flush 10 mL (has no administration in time range)   phenylephrine (MYDFRIN) 2.5 % ophthalmic solution 1 drop (has no administration in time range)   tropicamide (MYDRIACYL) 1 % ophthalmic solution 1  drop (has no administration in time range)   sodium chloride 0.9 % flush 10 mL (has no administration in time range)   sodium chloride 0.9 % flush 10 mL (has no administration in time range)   sodium chloride 0.9 % infusion 40 mL (has no administration in time range)   ondansetron (ZOFRAN) tablet 4 mg (has no administration in time range)     Or   ondansetron (ZOFRAN) injection 4 mg (has no administration in time range)   nitroglycerin (NITROSTAT) SL tablet 0.4 mg (has no administration in time range)   acetaminophen (TYLENOL) tablet 650 mg (has no administration in time range)       Imaging results:  No radiology results for the last day    Ambulatory status:   -      Social issues:   Social History     Socioeconomic History    Marital status:        NIH Stroke Scale:  Interval: baseline    Carmina Galeano RN  11/02/23 11:59 EDT

## 2023-11-02 NOTE — PLAN OF CARE
Goal Outcome Evaluation:           Progress: improving          Patient has dementia and can be overstimulated with care. She tries to earnestly be involved in care. Family is bedside. VSS.

## 2023-11-03 NOTE — PROGRESS NOTES
ED OBSERVATION PROGRESS/DISCHARGE SUMMARY    Date of Admission: 11/2/2023   LOS: 0 days   PCP: Ministerio Armas MD    Final Diagnosis cataract left eye      Subjective     Hospital Outcome:   83-year-old female,, resident of [facility] and [facility] the [facility], admitted to the observation unit with a complaint of sudden loss of vision in her left eye.  In the emergency department high-sensitivity troponins were 24, 25, delta 1.  All other labs are at patient's baseline.  EKG shows sinus rhythm.  Ophthalmology was consulted and they have seen the patient and determined that she has a large cataract in the left eye.  They have signed off and will follow patient on an outpatient basis in the office.  Patient's daughter, who was at bedside, states that she was told her whole life that her mother was legally blind in her left eye.  Patient does collaborate with her regarding this.  Patient returned back to [facility] to follow-up with ophthalmology on an outpatient basis.    ROS:  General: no fevers, chills  Respiratory: no cough, dyspnea  Cardiovascular: no chest pain, palpitations  Abdomen: No abdominal pain, nausea, vomiting, or diarrhea  Neurologic: No focal weakness    Objective   Physical Exam:  I have reviewed the vital signs.  Temp:  [98.4 °F (36.9 °C)] 98.4 °F (36.9 °C)  Heart Rate:  [69-95] 95  Resp:  [16-18] 18  BP: (118-160)/(56-70) 125/56  General Appearance:    cooperative, no distress  Head:    Normocephalic, atraumatic  Eyes:  Lens noted right eye, cataract in left eye   neck:   Supple, no mass  Lungs: Clear to auscultation bilaterally, respirations unlabored  Heart: Regular rate and rhythm, S1 and S2 normal, no murmur, rub or gallop  Abdomen:  Soft, non-tender, bowel sounds active, nondistended  Extremities: No clubbing, cyanosis, or edema to lower extremities  Pulses:  2+ and symmetric in distal lower extremities  Skin: No rashes   Neurologic: Disoriented due to dementia    Results Review:    I have  reviewed the labs, radiology results and diagnostic studies.    Results from last 7 days   Lab Units 11/02/23  1011   WBC 10*3/mm3 8.87   HEMOGLOBIN g/dL 11.7*   HEMATOCRIT % 35.8   PLATELETS 10*3/mm3 187     Results from last 7 days   Lab Units 11/02/23  1011   SODIUM mmol/L 141   POTASSIUM mmol/L 4.1   CHLORIDE mmol/L 104   CO2 mmol/L 26.9   BUN mg/dL 11   CREATININE mg/dL 0.79   CALCIUM mg/dL 9.5   BILIRUBIN mg/dL 0.5   ALK PHOS U/L 70   ALT (SGPT) U/L 11   AST (SGOT) U/L 22   GLUCOSE mg/dL 150*     Imaging Results (Last 24 Hours)       ** No results found for the last 24 hours. **            I have reviewed the medications.  ---------------------------------------------------------------------------------------------  Assessment & Plan   Assessment/Problem List    Vision loss of left eye      Plan:  Painless vision loss left eye  -Left eye appears cloudy, question if this is cataract the patient only recently noticed  -Consult ophthalmology  -Patient reports that she does not want surgery on her eye if needed  -Monitor for now     Diabetes  -With long-term use of insulin   -Accu-Checks 4 times daily with meals and at bedtime   -Low dose correctional factor with hypoglycemic protocol     Dementia  -Continue Namenda and Aricept     Hypertension  -Continue amlodipine, losartan, metoprolol, hydralazine     Hyperlipidemia  -Continue statin    Disposition: Return to [facility]    Follow-up after Discharge: Ophthalmology in 1 month    This note will serve as a discharge summary    Pretty Hoffman, APRN 11/02/23 20:08 EDT    I have worn appropriate PPE during this patient encounter, sanitized my hands both with entering and exiting patient's room.      30 minutes has been spent by [facility] providers in the care of this patient while under observation status

## 2023-11-03 NOTE — PROGRESS NOTES
"Patient admitted to the observation unit from the emergency department with history of dementia and possible loss of vision in her left eye since yesterday.  Patient is a poor historian but reported that the vision loss was new initially.  She was admitted to the observation for ophthalmology evaluation.  Currently patient denies any complaints and reports feeling \"better\" although this is nonspecific and cannot currently clarify in what ways she feels better.     On exam,   General: No acute distress, nontoxic  HEENT: EOMI  Pulm: Symmetric chest rise, nonlabored breathing  CV: Regular rate and rhythm  GI: Nondistended  MSK: No deformity  Skin: Warm, dry  Neuro: Awake, alert, moving all extremities, no focal deficits  Psych: Calm, cooperative    Vital signs and nursing notes reviewed.           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  History of cataract surgery on the right in 2016.  They recommend outpatient follow-up.  Labs are otherwise reassuring with no acute emergent findings.  She is well-appearing in no acute distress, safe for discharge back to facility.    Attestation:  The SUSU and I have discussed this patient's history, physical exam, and treatment plan.  I have reviewed the documentation and personally had a face to face interaction with the patient. I affirm the documentation and agree with the treatment and plan.  The attached note describes my personal findings.         "

## 2024-04-04 ENCOUNTER — APPOINTMENT (OUTPATIENT)
Dept: GENERAL RADIOLOGY | Facility: HOSPITAL | Age: 84
End: 2024-04-04
Payer: MEDICARE

## 2024-04-04 ENCOUNTER — HOSPITAL ENCOUNTER (EMERGENCY)
Facility: HOSPITAL | Age: 84
Discharge: HOME | End: 2024-04-04
Attending: EMERGENCY MEDICINE
Payer: MEDICARE

## 2024-04-04 VITALS
RESPIRATION RATE: 18 BRPM | TEMPERATURE: 97.6 F | OXYGEN SATURATION: 96 % | DIASTOLIC BLOOD PRESSURE: 74 MMHG | HEART RATE: 80 BPM | SYSTOLIC BLOOD PRESSURE: 168 MMHG

## 2024-04-04 DIAGNOSIS — S40.022A ARM CONTUSION, LEFT, INITIAL ENCOUNTER: ICD-10-CM

## 2024-04-04 DIAGNOSIS — S46.912A STRAIN OF LEFT SHOULDER, INITIAL ENCOUNTER: Primary | ICD-10-CM

## 2024-04-04 PROCEDURE — 99283 EMERGENCY DEPT VISIT LOW MDM: CPT

## 2024-04-04 PROCEDURE — 73030 X-RAY EXAM OF SHOULDER: CPT

## 2024-04-04 PROCEDURE — 73070 X-RAY EXAM OF ELBOW: CPT

## 2024-04-04 RX ORDER — HYDROCODONE BITARTRATE AND ACETAMINOPHEN 5; 325 MG/1; MG/1
1 TABLET ORAL EVERY 6 HOURS PRN
Status: DISCONTINUED | OUTPATIENT
Start: 2024-04-04 | End: 2024-04-04 | Stop reason: HOSPADM

## 2024-04-04 RX ORDER — LOPERAMIDE HYDROCHLORIDE 2 MG/1
2 CAPSULE ORAL 4 TIMES DAILY PRN
COMMUNITY

## 2024-04-04 NOTE — ED NOTES
This RN spoke with pt's daughter Jeannine informing her of pt discharge, Jeannine is unable to retrieve daughter from hospital at this time for discharge

## 2024-04-04 NOTE — ED NOTES
This RN spoke with Mar at [facility] informing her that this pt was being discharged and is awaiting ETA for pt to return.

## 2024-04-04 NOTE — ED PROVIDER NOTES
EMERGENCY DEPARTMENT ENCOUNTER    Room Number:  12/12  PCP: Ministerio Armas MD  Patient Care Team:  Ministerio Armas MD as PCP - General (Internal Medicine)   Independent Historians: EMS, patient    HPI:  Chief Complaint: Fall    A complete HPI/ROS/PMH/PSH/SH/FH are unobtainable due to: Patient is demented    Chronic or social conditions impacting patient care (Social Determinants of Health): None  (Financial Resource Strain / Food Insecurity / Transportation Needs / Physical Activity / Stress / Social Connections / Intimate Partner Violence / Housing Stability)    Context: Kecia Martinez is a 83 y.o. female who presents to the ED c/o acute left shoulder and elbow pain.  Patient had unwitnessed fall in her room.  Patient denies any blow to her head.  Is at baseline mentally.  Complaining only of pain to the left arm.  Denies chest pain denies belly pain.    Review of prior external notes (non-ED) -and- Review of prior external test results outside of this encounter: Reviewed patient's discharge summary from 11/2/2023    Prescription drug monitoring program review:         PAST MEDICAL HISTORY  Active Ambulatory Problems     Diagnosis Date Noted    Vision loss of left eye 11/02/2023     Resolved Ambulatory Problems     Diagnosis Date Noted    No Resolved Ambulatory Problems     Past Medical History:   Diagnosis Date    Alzheimer disease     Myocardial infarct, old     Type 2 diabetes mellitus          PAST SURGICAL HISTORY  History reviewed. No pertinent surgical history.      FAMILY HISTORY  History reviewed. No pertinent family history.      SOCIAL HISTORY  Social History     Socioeconomic History    Marital status:    Tobacco Use    Smoking status: Unknown     Passive exposure: Past   Vaping Use    Vaping status: Unknown   Substance and Sexual Activity    Alcohol use: Defer    Drug use: Defer    Sexual activity: Defer         ALLERGIES  Penicillins, Sulfa antibiotics, and Sulfanilamide        REVIEW OF  SYSTEMS  Review of Systems  Included in HPI  All systems reviewed and negative except for those discussed in HPI.      PHYSICAL EXAM    I have reviewed the triage vital signs and nursing notes.    ED Triage Vitals [04/04/24 0136]   Temp Heart Rate Resp BP SpO2   97.6 °F (36.4 °C) 81 18 (!) 190/79 97 %      Temp src Heart Rate Source Patient Position BP Location FiO2 (%)   -- -- -- -- --       Physical Exam  GENERAL: alert, no acute distress  SKIN: Warm, dry  HENT: Normocephalic, atraumatic  EYES: no scleral icterus  CV: regular rhythm, regular rate  RESPIRATORY: normal effort, lungs clear  ABDOMEN: soft, nontender, nondistended  MUSCULOSKELETAL: no deformity, tender palpation over left elbow and left shoulder neurovascular intact distally  NEURO: alert, moves all extremities, follows commands                                                               LAB RESULTS  No results found for this or any previous visit (from the past 24 hour(s)).    I ordered the above labs and independently reviewed the results.        RADIOLOGY  XR ELBOW 2 VIEW LEFT    Result Date: 4/4/2024  2 VIEWS LEFT ELBOW  HISTORY: Fall  COMPARISON: None available.  FINDINGS: No acute fracture or subluxation of the left elbow is seen. No definite elbow effusion is seen. Enthesopathic changes seen along the posterior aspect of the olecranon.      No acute fracture or subluxation identified.  This report was finalized on 4/4/2024 2:23 AM by Dr. Miladis Aguirre M.D on Workstation: [id number]      XR Shoulder 2+ View Left    Result Date: 4/4/2024  2 VIEWS LEFT SHOULDER  HISTORY: Left shoulder pain  COMPARISON: None available.  FINDINGS: No acute fracture or subluxation of the left shoulder is seen. There are degenerative changes involving the left glenohumeral joint, and to a greater extent, the left AC joint.      No acute fracture or subluxation identified.  This report was finalized on 4/4/2024 2:22 AM by Dr. Miladis Aguirre M.D on  Workstation: [id number]       I ordered the above noted radiological studies. Reviewed by me and discussed with radiologist.  See dictation for official radiology interpretation.      PROCEDURES    Procedures      MEDICATIONS GIVEN IN ER    Medications   HYDROcodone-acetaminophen (NORCO) 5-325 MG per tablet 1 tablet (1 tablet Oral Not Given 4/4/24 0238)         ORDERS PLACED DURING THIS VISIT:  Orders Placed This Encounter   Procedures    XR Shoulder 2+ View Left    XR ELBOW 2 VIEW LEFT    Obtain & Apply The Following- Upper extremity; Sling         PROGRESS, DATA ANALYSIS, CONSULTS, AND MEDICAL DECISION MAKING    All labs have been independently interpreted by me.  All radiology studies have been reviewed by me and discussed with radiologist dictating the report.   EKG's independently viewed and interpreted by me.  Discussion below represents my analysis of pertinent findings related to patient's condition, differential diagnosis, treatment plan and final disposition.    Differential diagnosis includes but is not limited to fracture, sprain, strain, dislocation.    Clinical Scores:                           PPE: The patient wore a mask and I wore an N95 mask throughout the entire patient encounter.       AS OF 06:32 EDT VITALS:    BP - 169/81  HR - 73  TEMP - 97.6 °F (36.4 °C)  O2 SATS - 98%        DIAGNOSIS  Final diagnoses:   Strain of left shoulder, initial encounter   Arm contusion, left, initial encounter         DISPOSITION  ED Disposition       ED Disposition   Discharge    Condition   Stable    Comment   --                  Note Disclaimer: At [facility], we believe that sharing information builds trust and better relationships. You are receiving this note because you recently visited [facility]. It is possible you will see health information before a provider has talked with you about it. This kind of information can be easy to misunderstand. To help you fully understand what it means for your  health, we urge you to discuss this note with your provider.         Reed Doherty MD  04/04/24 0632

## 2024-04-04 NOTE — ED NOTES
This RN spoke with Jeannine, the pt daughter. Jeannine states she would try to arrange a ride for pt to get back to [place] and call back with details.

## 2024-04-04 NOTE — ED NOTES
This RN spoke with the Guardianship office and informed this office that Ms. Martinez would be returning to her facilty.

## 2024-04-04 NOTE — CASE MANAGEMENT/SOCIAL WORK
Discharge Planning Assessment  [facility]     Patient Name: Kecia Martinez  MRN: [id number]  Today's Date: 4/4/2024    Admit Date: 4/4/2024        Discharge Needs Assessment    No documentation.                  Discharge Plan       Row Name 04/04/24 0819       Plan    Plan Comments Noted on chart that pt has legal guardian. [place] and paperwork on chart. RN notes stated that she notified guardiasnhip offcie that pt would be returning to facility. -If daughter calls RN that she can provide trasnport, RN will contact guardian. Per trinh GALLARDO to hear back from [facility] re ?W/C van transport. Message sent to SEJAL Coleman CCP mgr, re status-WIN Ayala RN                  Continued Care and Services - Admitted Since 4/4/2024    No active coordination exists for this encounter.          Demographic Summary    No documentation.                  Functional Status    No documentation.                  Psychosocial    No documentation.                  Abuse/Neglect    No documentation.                  Legal    No documentation.                  Substance Abuse    No documentation.                  Patient Forms    No documentation.                     Elzbieta Ayala RN

## 2024-04-04 NOTE — ED NOTES
Spoke with daughter, she requested patient be transferred back to [facility] via ambulance/wheelchair van.

## 2024-04-04 NOTE — ED NOTES
Spoke with Joana Nuñez with Health [facility] family [facility], division of guardian ship and received consent to transfer back to [facility].

## 2024-04-04 NOTE — ED NOTES
Pt to ED from [facility] via [facility]. Pt had an unwitnessed fall tonight from standing on her bed. No LOC and denies being on blood thinners. Pt has left arm pain, and a small contusion on the left thumb per report from the facility. Pain 8/10. Pt has a hx of dementia and alzheimer per the report from facility.

## 2024-10-18 ENCOUNTER — APPOINTMENT (OUTPATIENT)
Dept: GENERAL RADIOLOGY | Facility: HOSPITAL | Age: 84
End: 2024-10-18
Payer: MEDICARE

## 2024-10-18 ENCOUNTER — HOSPITAL ENCOUNTER (INPATIENT)
Facility: HOSPITAL | Age: 84
LOS: 7 days | Discharge: NURSING, REHAB OR LONG-TERM CARE | End: 2024-10-25
Attending: EMERGENCY MEDICINE | Admitting: INTERNAL MEDICINE
Payer: MEDICARE

## 2024-10-18 DIAGNOSIS — E11.65 HYPERGLYCEMIA DUE TO DIABETES MELLITUS: ICD-10-CM

## 2024-10-18 DIAGNOSIS — M25.552 LEFT HIP PAIN: ICD-10-CM

## 2024-10-18 DIAGNOSIS — E87.70 HYPERVOLEMIA, UNSPECIFIED HYPERVOLEMIA TYPE: ICD-10-CM

## 2024-10-18 DIAGNOSIS — R79.89 ELEVATED BRAIN NATRIURETIC PEPTIDE (BNP) LEVEL: ICD-10-CM

## 2024-10-18 DIAGNOSIS — R79.89 ELEVATED TROPONIN: ICD-10-CM

## 2024-10-18 DIAGNOSIS — J96.01 ACUTE HYPOXEMIC RESPIRATORY FAILURE: Primary | ICD-10-CM

## 2024-10-18 LAB
ALBUMIN SERPL-MCNC: 3.7 G/DL (ref 3.5–5.2)
ALBUMIN/GLOB SERPL: 1.2 G/DL
ALP SERPL-CCNC: 85 U/L (ref 39–117)
ALT SERPL W P-5'-P-CCNC: 12 U/L (ref 1–33)
ANION GAP SERPL CALCULATED.3IONS-SCNC: 15.6 MMOL/L (ref 5–15)
AST SERPL-CCNC: 91 U/L (ref 1–32)
B PARAPERT DNA SPEC QL NAA+PROBE: NOT DETECTED
B PERT DNA SPEC QL NAA+PROBE: NOT DETECTED
BASOPHILS # BLD AUTO: 0.03 10*3/MM3 (ref 0–0.2)
BASOPHILS NFR BLD AUTO: 0.2 % (ref 0–1.5)
BILIRUB SERPL-MCNC: 0.4 MG/DL (ref 0–1.2)
BUN SERPL-MCNC: 24 MG/DL (ref 8–23)
BUN/CREAT SERPL: 23.5 (ref 7–25)
C PNEUM DNA NPH QL NAA+NON-PROBE: NOT DETECTED
CALCIUM SPEC-SCNC: 9.7 MG/DL (ref 8.6–10.5)
CHLORIDE SERPL-SCNC: 101 MMOL/L (ref 98–107)
CO2 SERPL-SCNC: 19.4 MMOL/L (ref 22–29)
CREAT SERPL-MCNC: 1.02 MG/DL (ref 0.57–1)
D-LACTATE SERPL-SCNC: 4.1 MMOL/L (ref 0.5–2)
DEPRECATED RDW RBC AUTO: 40.8 FL (ref 37–54)
EGFRCR SERPLBLD CKD-EPI 2021: 55 ML/MIN/1.73
EOSINOPHIL # BLD AUTO: 0 10*3/MM3 (ref 0–0.4)
EOSINOPHIL NFR BLD AUTO: 0 % (ref 0.3–6.2)
ERYTHROCYTE [DISTWIDTH] IN BLOOD BY AUTOMATED COUNT: 12.4 % (ref 12.3–15.4)
FLUAV SUBTYP SPEC NAA+PROBE: NOT DETECTED
FLUBV RNA ISLT QL NAA+PROBE: NOT DETECTED
GEN 5 2HR TROPONIN T REFLEX: 2113 NG/L
GLOBULIN UR ELPH-MCNC: 3.2 GM/DL
GLUCOSE BLDC GLUCOMTR-MCNC: 310 MG/DL (ref 70–130)
GLUCOSE SERPL-MCNC: 359 MG/DL (ref 65–99)
HADV DNA SPEC NAA+PROBE: NOT DETECTED
HCOV 229E RNA SPEC QL NAA+PROBE: NOT DETECTED
HCOV HKU1 RNA SPEC QL NAA+PROBE: NOT DETECTED
HCOV NL63 RNA SPEC QL NAA+PROBE: NOT DETECTED
HCOV OC43 RNA SPEC QL NAA+PROBE: NOT DETECTED
HCT VFR BLD AUTO: 37 % (ref 34–46.6)
HGB BLD-MCNC: 12.2 G/DL (ref 12–15.9)
HMPV RNA NPH QL NAA+NON-PROBE: NOT DETECTED
HOLD SPECIMEN: NORMAL
HOLD SPECIMEN: NORMAL
HPIV1 RNA ISLT QL NAA+PROBE: NOT DETECTED
HPIV2 RNA SPEC QL NAA+PROBE: NOT DETECTED
HPIV3 RNA NPH QL NAA+PROBE: NOT DETECTED
HPIV4 P GENE NPH QL NAA+PROBE: NOT DETECTED
IMM GRANULOCYTES # BLD AUTO: 0.06 10*3/MM3 (ref 0–0.05)
IMM GRANULOCYTES NFR BLD AUTO: 0.4 % (ref 0–0.5)
LYMPHOCYTES # BLD AUTO: 0.85 10*3/MM3 (ref 0.7–3.1)
LYMPHOCYTES NFR BLD AUTO: 5.9 % (ref 19.6–45.3)
M PNEUMO IGG SER IA-ACNC: NOT DETECTED
MCH RBC QN AUTO: 30.1 PG (ref 26.6–33)
MCHC RBC AUTO-ENTMCNC: 33 G/DL (ref 31.5–35.7)
MCV RBC AUTO: 91.4 FL (ref 79–97)
MONOCYTES # BLD AUTO: 0.73 10*3/MM3 (ref 0.1–0.9)
MONOCYTES NFR BLD AUTO: 5.1 % (ref 5–12)
NEUTROPHILS NFR BLD AUTO: 12.64 10*3/MM3 (ref 1.7–7)
NEUTROPHILS NFR BLD AUTO: 88.4 % (ref 42.7–76)
NRBC BLD AUTO-RTO: 0 /100 WBC (ref 0–0.2)
NT-PROBNP SERPL-MCNC: ABNORMAL PG/ML (ref 0–1800)
PLATELET # BLD AUTO: 193 10*3/MM3 (ref 140–450)
PMV BLD AUTO: 11.6 FL (ref 6–12)
POTASSIUM SERPL-SCNC: 5 MMOL/L (ref 3.5–5.2)
PROCALCITONIN SERPL-MCNC: 0.29 NG/ML (ref 0–0.25)
PROT SERPL-MCNC: 6.9 G/DL (ref 6–8.5)
RBC # BLD AUTO: 4.05 10*6/MM3 (ref 3.77–5.28)
RHINOVIRUS RNA SPEC NAA+PROBE: NOT DETECTED
RSV RNA NPH QL NAA+NON-PROBE: NOT DETECTED
SARS-COV-2 RNA NPH QL NAA+NON-PROBE: NOT DETECTED
SODIUM SERPL-SCNC: 136 MMOL/L (ref 136–145)
TROPONIN T DELTA: 591 NG/L
TROPONIN T SERPL HS-MCNC: 1522 NG/L
WBC NRBC COR # BLD AUTO: 14.31 10*3/MM3 (ref 3.4–10.8)
WHOLE BLOOD HOLD COAG: NORMAL
WHOLE BLOOD HOLD SPECIMEN: NORMAL

## 2024-10-18 PROCEDURE — 93010 ELECTROCARDIOGRAM REPORT: CPT | Performed by: INTERNAL MEDICINE

## 2024-10-18 PROCEDURE — 93005 ELECTROCARDIOGRAM TRACING: CPT

## 2024-10-18 PROCEDURE — 94761 N-INVAS EAR/PLS OXIMETRY MLT: CPT

## 2024-10-18 PROCEDURE — 25010000002 ENOXAPARIN PER 10 MG: Performed by: PHYSICIAN ASSISTANT

## 2024-10-18 PROCEDURE — 84145 PROCALCITONIN (PCT): CPT | Performed by: PHYSICIAN ASSISTANT

## 2024-10-18 PROCEDURE — 94799 UNLISTED PULMONARY SVC/PX: CPT

## 2024-10-18 PROCEDURE — 82948 REAGENT STRIP/BLOOD GLUCOSE: CPT

## 2024-10-18 PROCEDURE — 93005 ELECTROCARDIOGRAM TRACING: CPT | Performed by: EMERGENCY MEDICINE

## 2024-10-18 PROCEDURE — 25010000002 FUROSEMIDE PER 20 MG: Performed by: PHYSICIAN ASSISTANT

## 2024-10-18 PROCEDURE — 71045 X-RAY EXAM CHEST 1 VIEW: CPT

## 2024-10-18 PROCEDURE — 80053 COMPREHEN METABOLIC PANEL: CPT | Performed by: EMERGENCY MEDICINE

## 2024-10-18 PROCEDURE — 87040 BLOOD CULTURE FOR BACTERIA: CPT | Performed by: PHYSICIAN ASSISTANT

## 2024-10-18 PROCEDURE — 84484 ASSAY OF TROPONIN QUANT: CPT | Performed by: EMERGENCY MEDICINE

## 2024-10-18 PROCEDURE — 93005 ELECTROCARDIOGRAM TRACING: CPT | Performed by: PHYSICIAN ASSISTANT

## 2024-10-18 PROCEDURE — 83605 ASSAY OF LACTIC ACID: CPT | Performed by: PHYSICIAN ASSISTANT

## 2024-10-18 PROCEDURE — 25010000002 METHYLPREDNISOLONE PER 125 MG: Performed by: PHYSICIAN ASSISTANT

## 2024-10-18 PROCEDURE — 83880 ASSAY OF NATRIURETIC PEPTIDE: CPT | Performed by: EMERGENCY MEDICINE

## 2024-10-18 PROCEDURE — 85025 COMPLETE CBC W/AUTO DIFF WBC: CPT | Performed by: EMERGENCY MEDICINE

## 2024-10-18 PROCEDURE — 36415 COLL VENOUS BLD VENIPUNCTURE: CPT | Performed by: PHYSICIAN ASSISTANT

## 2024-10-18 PROCEDURE — 94664 DEMO&/EVAL PT USE INHALER: CPT

## 2024-10-18 PROCEDURE — 94640 AIRWAY INHALATION TREATMENT: CPT

## 2024-10-18 PROCEDURE — 99291 CRITICAL CARE FIRST HOUR: CPT

## 2024-10-18 PROCEDURE — 0202U NFCT DS 22 TRGT SARS-COV-2: CPT | Performed by: EMERGENCY MEDICINE

## 2024-10-18 RX ORDER — ASPIRIN 81 MG/1
324 TABLET, CHEWABLE ORAL DAILY
Status: DISCONTINUED | OUTPATIENT
Start: 2024-10-19 | End: 2024-10-20

## 2024-10-18 RX ORDER — FUROSEMIDE 10 MG/ML
4 INJECTION INTRAMUSCULAR; INTRAVENOUS ONCE
COMMUNITY
Start: 2024-10-18 | End: 2024-10-25 | Stop reason: HOSPADM

## 2024-10-18 RX ORDER — ACETAMINOPHEN 500 MG
500 TABLET ORAL 2 TIMES DAILY
Status: DISCONTINUED | OUTPATIENT
Start: 2024-10-19 | End: 2024-10-19

## 2024-10-18 RX ORDER — SODIUM CHLORIDE 1 G/1
1 TABLET ORAL 2 TIMES DAILY
Status: DISCONTINUED | OUTPATIENT
Start: 2024-10-19 | End: 2024-10-25 | Stop reason: HOSPADM

## 2024-10-18 RX ORDER — IPRATROPIUM BROMIDE AND ALBUTEROL SULFATE 2.5; .5 MG/3ML; MG/3ML
3 SOLUTION RESPIRATORY (INHALATION) ONCE
Status: COMPLETED | OUTPATIENT
Start: 2024-10-18 | End: 2024-10-18

## 2024-10-18 RX ORDER — FUROSEMIDE 10 MG/ML
80 INJECTION INTRAMUSCULAR; INTRAVENOUS 3 TIMES DAILY
Status: DISCONTINUED | OUTPATIENT
Start: 2024-10-19 | End: 2024-10-19

## 2024-10-18 RX ORDER — SODIUM CHLORIDE 0.9 % (FLUSH) 0.9 %
10 SYRINGE (ML) INJECTION AS NEEDED
Status: DISCONTINUED | OUTPATIENT
Start: 2024-10-18 | End: 2024-10-25 | Stop reason: HOSPADM

## 2024-10-18 RX ORDER — NICOTINE POLACRILEX 4 MG
15 LOZENGE BUCCAL
Status: DISCONTINUED | OUTPATIENT
Start: 2024-10-18 | End: 2024-10-25 | Stop reason: HOSPADM

## 2024-10-18 RX ORDER — ACETAMINOPHEN 500 MG
500 TABLET ORAL 2 TIMES DAILY
COMMUNITY

## 2024-10-18 RX ORDER — DEXTROSE MONOHYDRATE 25 G/50ML
25 INJECTION, SOLUTION INTRAVENOUS
Status: DISCONTINUED | OUTPATIENT
Start: 2024-10-18 | End: 2024-10-25 | Stop reason: HOSPADM

## 2024-10-18 RX ORDER — METOPROLOL SUCCINATE 100 MG/1
100 TABLET, EXTENDED RELEASE ORAL DAILY
Status: DISCONTINUED | OUTPATIENT
Start: 2024-10-19 | End: 2024-10-19

## 2024-10-18 RX ORDER — DONEPEZIL HYDROCHLORIDE 10 MG/1
10 TABLET, FILM COATED ORAL NIGHTLY
Status: DISCONTINUED | OUTPATIENT
Start: 2024-10-18 | End: 2024-10-25 | Stop reason: HOSPADM

## 2024-10-18 RX ORDER — ATORVASTATIN CALCIUM 20 MG/1
10 TABLET, FILM COATED ORAL DAILY
Status: DISCONTINUED | OUTPATIENT
Start: 2024-10-19 | End: 2024-10-25 | Stop reason: HOSPADM

## 2024-10-18 RX ORDER — ENOXAPARIN SODIUM 100 MG/ML
1 INJECTION SUBCUTANEOUS ONCE
Status: COMPLETED | OUTPATIENT
Start: 2024-10-18 | End: 2024-10-18

## 2024-10-18 RX ORDER — FUROSEMIDE 10 MG/ML
80 INJECTION INTRAMUSCULAR; INTRAVENOUS ONCE
Status: COMPLETED | OUTPATIENT
Start: 2024-10-18 | End: 2024-10-18

## 2024-10-18 RX ORDER — IPRATROPIUM BROMIDE AND ALBUTEROL SULFATE 2.5; .5 MG/3ML; MG/3ML
3 SOLUTION RESPIRATORY (INHALATION)
Status: DISCONTINUED | OUTPATIENT
Start: 2024-10-19 | End: 2024-10-25 | Stop reason: HOSPADM

## 2024-10-18 RX ORDER — ASPIRIN 81 MG/1
324 TABLET, CHEWABLE ORAL ONCE
Status: COMPLETED | OUTPATIENT
Start: 2024-10-18 | End: 2024-10-18

## 2024-10-18 RX ORDER — IBUPROFEN 600 MG/1
1 TABLET ORAL
Status: DISCONTINUED | OUTPATIENT
Start: 2024-10-18 | End: 2024-10-25 | Stop reason: HOSPADM

## 2024-10-18 RX ORDER — LOSARTAN POTASSIUM 100 MG/1
100 TABLET ORAL DAILY
Status: DISCONTINUED | OUTPATIENT
Start: 2024-10-19 | End: 2024-10-18

## 2024-10-18 RX ORDER — MEMANTINE HYDROCHLORIDE 10 MG/1
10 TABLET ORAL 2 TIMES DAILY
Status: DISCONTINUED | OUTPATIENT
Start: 2024-10-19 | End: 2024-10-25 | Stop reason: HOSPADM

## 2024-10-18 RX ORDER — ENOXAPARIN SODIUM 100 MG/ML
1 INJECTION SUBCUTANEOUS EVERY 12 HOURS
Status: DISCONTINUED | OUTPATIENT
Start: 2024-10-19 | End: 2024-10-20

## 2024-10-18 RX ORDER — MECLIZINE HCL 12.5 MG 12.5 MG/1
12.5 TABLET ORAL 2 TIMES DAILY
COMMUNITY
End: 2024-10-25 | Stop reason: HOSPADM

## 2024-10-18 RX ORDER — IPRATROPIUM BROMIDE AND ALBUTEROL SULFATE 2.5; .5 MG/3ML; MG/3ML
3 SOLUTION RESPIRATORY (INHALATION) 2 TIMES DAILY
COMMUNITY

## 2024-10-18 RX ORDER — METHYLPREDNISOLONE SODIUM SUCCINATE 125 MG/2ML
60 INJECTION, POWDER, LYOPHILIZED, FOR SOLUTION INTRAMUSCULAR; INTRAVENOUS ONCE
Status: COMPLETED | OUTPATIENT
Start: 2024-10-18 | End: 2024-10-18

## 2024-10-18 RX ORDER — LEVOFLOXACIN 750 MG/1
750 TABLET, FILM COATED ORAL ONCE
Status: COMPLETED | OUTPATIENT
Start: 2024-10-18 | End: 2024-10-18

## 2024-10-18 RX ADMIN — ENOXAPARIN SODIUM 60 MG: 100 INJECTION SUBCUTANEOUS at 18:55

## 2024-10-18 RX ADMIN — IPRATROPIUM BROMIDE AND ALBUTEROL SULFATE 3 ML: 2.5; .5 SOLUTION RESPIRATORY (INHALATION) at 17:51

## 2024-10-18 RX ADMIN — FUROSEMIDE 80 MG: 10 INJECTION, SOLUTION INTRAMUSCULAR; INTRAVENOUS at 18:42

## 2024-10-18 RX ADMIN — LEVOFLOXACIN 750 MG: 750 TABLET, FILM COATED ORAL at 22:24

## 2024-10-18 RX ADMIN — ASPIRIN 324 MG: 81 TABLET, CHEWABLE ORAL at 18:55

## 2024-10-18 RX ADMIN — METHYLPREDNISOLONE SODIUM SUCCINATE 60 MG: 125 INJECTION INTRAMUSCULAR; INTRAVENOUS at 18:41

## 2024-10-18 NOTE — PROGRESS NOTES
Clinical Pharmacy Services: Medication History    Kecia Martinez is a 84 y.o. female presenting to [facility] for   Chief Complaint   Patient presents with    Shortness of Breath       She  has a past medical history of Alzheimer disease, Myocardial infarct, old, and Type 2 diabetes mellitus.    Allergies as of 10/18/2024 - Reviewed 10/18/2024   Allergen Reaction Noted    Penicillins Unknown - High Severity 11/02/2023    Sulfa antibiotics Unknown - High Severity 11/02/2023    Sulfanilamide Unknown - High Severity 11/02/2023       Medication information was obtained from: [facility] Paperwork   Pharmacy and Phone Number:     Prior to Admission Medications       Prescriptions Last Dose Informant Patient Reported? Taking?    acetaminophen (TYLENOL) 500 MG tablet  Nursing Home Yes Yes    Take 1 tablet by mouth 2 (Two) Times a Day.    amLODIPine (NORVASC) 10 MG tablet  Nursing Home Yes Yes    Take 1 tablet by mouth Daily.    atorvastatin (LIPITOR) 20 MG tablet  Nursing Home Yes Yes    Take 0.5 tablets by mouth Daily.    donepezil (ARICEPT) 10 MG tablet  Nursing Home Yes Yes    Take 1 tablet by mouth Every Night.    Dulaglutide (Trulicity) 3 MG/0.5ML solution pen-injector  Nursing Home Yes Yes    Inject 0.5 mL under the skin into the appropriate area as directed 1 (One) Time Per Week. Thurs    furosemide (LASIX) 10 MG/ML injection  Nursing Home Yes Yes    Inject 4 mL into the appropriate muscle as directed by prescriber 1 (One) Time.    Glucagon 3 MG/DOSE powder  Nursing Home Yes Yes    Administer 1 spray into the nostril(s) as directed by provider As Needed (hypoglycemia).    hydrALAZINE (APRESOLINE) 50 MG tablet  Nursing Home Yes Yes    Take 1 tablet by mouth 3 (Three) Times a Day.    Insulin Glargine (BASAGLAR KWIKPEN) 100 UNIT/ML injection pen  Nursing Home Yes Yes    Inject 14 Units under the skin into the appropriate area as directed Daily.    ipratropium-albuterol (DUO-NEB) 0.5-2.5 mg/3 ml nebulizer   Nursing Home Yes Yes    Take 3 mL by nebulization 2 (Two) Times a Day.    losartan (COZAAR) 100 MG tablet  Nursing Home Yes Yes    Take 1 tablet by mouth Daily.    meclizine (ANTIVERT) 12.5 MG tablet  Nursing Home Yes Yes    Take 1 tablet by mouth 2 (Two) Times a Day.    memantine (NAMENDA) 10 MG tablet  Nursing Home Yes Yes    Take 1 tablet by mouth 2 (Two) Times a Day.    metFORMIN (GLUCOPHAGE) 500 MG tablet  Nursing Home Yes Yes    Take 2 tablets by mouth 2 (Two) Times a Day With Meals.    metoprolol succinate XL (TOPROL-XL) 100 MG 24 hr tablet  Nursing Home Yes Yes    Take 1 tablet by mouth Daily.    polyethylene glycol (MIRALAX) 17 g packet  Nursing Home Yes Yes    Take 17 g by mouth 2 (Two) Times a Day.    sennosides-docusate (senna-docusate sodium) 8.6-50 MG per tablet  Nursing Home Yes Yes    Take 2 tablets by mouth Daily.    sodium chloride 1 g tablet  Nursing Home Yes Yes    Take 1 tablet by mouth 2 (Two) Times a Day.    glucagon, human recombinant, (GLUCAGEN DIAGNOSTIC) 1 MG injection   Yes No    Inject 1 mg under the skin into the appropriate area as directed 1 (One) Time.    loperamide (IMODIUM) 2 MG capsule   Yes No    Take 1 capsule by mouth 4 (Four) Times a Day As Needed for Diarrhea.    magnesium hydroxide (MILK OF MAGNESIA) 400 MG/5ML suspension  Nursing Home Yes No    Take 30 mL by mouth Daily As Needed for Constipation.              Medication notes: Per nursing home paperwork pt is suppose to have lasix injection 1 time only for fluid overload.    This medication list is complete to the best of my knowledge as of 10/18/2024    Please call if questions.    Renan Garcia  Medication History Technician   [phone]    10/18/2024 18:59 EDT

## 2024-10-18 NOTE — ED PROVIDER NOTES
EMERGENCY DEPARTMENT ENCOUNTER  Room Number:  36/36  PCP: Ministerio Armas MD  Independent Historians: Patient and EMS      HPI:  Chief Complaint: had concerns including Shortness of Breath.     A complete HPI/ROS/PMH/PSH/SH/FH are unobtainable due to: Dementia    Chronic or social conditions impacting patient care (Social Determinants of Health): None      Context: Kecia Martinez is a 84 y.o. female with a medical history of Alzheimer's disease, diabetes, MI, vision loss, hypertension, hyperlipidemia who presents to the ED c/o acute shortness of breath.  Patient presents via EMS from nursing facility.  Patient noted to have low O2 sats at facility today and was placed on 3 L oxygen nasal cannula.  She continued to complain of shortness of breath and EMS was called.  Upon their arrival, patient noted to have bilateral wheezes.  She was given 2 DuoNeb treatments and route.  No reported fever.  Patient denies chest pain.  History otherwise limited due to dementia.      Review of prior external notes (non-ED) -and- Review of prior external test results outside of this encounter:  No prior office or urgent care visits in system available for review.  Reviewed prior laboratory studies on 3/22/2024.  CBC with hemoglobin 10.8, BMP with creatinine 0.90.    Prescription drug monitoring program review:     N/A    PAST MEDICAL HISTORY  Active Ambulatory Problems     Diagnosis Date Noted    Vision loss of left eye 11/02/2023     Resolved Ambulatory Problems     Diagnosis Date Noted    No Resolved Ambulatory Problems     Past Medical History:   Diagnosis Date    Alzheimer disease     Myocardial infarct, old     Type 2 diabetes mellitus          PAST SURGICAL HISTORY  History reviewed. No pertinent surgical history.      FAMILY HISTORY  History reviewed. No pertinent family history.      SOCIAL HISTORY  Social History     Socioeconomic History    Marital status:    Tobacco Use    Smoking status: Unknown     Passive exposure:  Past   Vaping Use    Vaping status: Unknown   Substance and Sexual Activity    Alcohol use: Defer    Drug use: Defer    Sexual activity: Defer         ALLERGIES  Penicillins, Sulfa antibiotics, and Sulfanilamide      REVIEW OF SYSTEMS  Included in HPI  All systems reviewed and negative except for those discussed in HPI.      PHYSICAL EXAM    I have reviewed the triage vital signs and nursing notes.    ED Triage Vitals [10/18/24 1657]   Temp Heart Rate Resp BP SpO2   98.2 °F (36.8 °C) 85 16 136/59 100 %      Temp src Heart Rate Source Patient Position BP Location FiO2 (%)   -- -- -- -- --       Physical Exam  Constitutional:       General: She is not in acute distress.     Appearance: She is well-developed. She is ill-appearing.   HENT:      Head: Normocephalic and atraumatic.   Eyes:      Extraocular Movements: Extraocular movements intact.   Cardiovascular:      Rate and Rhythm: Normal rate and regular rhythm.      Heart sounds: Normal heart sounds.      Comments: Distal pulses intact  Pulmonary:      Effort: Pulmonary effort is normal.      Breath sounds: Wheezing present.      Comments: O2 sats 94% on 3 L oxygen nasal cannula  Abdominal:      General: There is no distension.   Skin:     General: Skin is warm.   Neurological:      General: No focal deficit present.      Mental Status: She is alert.   Psychiatric:         Mood and Affect: Mood normal.             LAB RESULTS  Recent Results (from the past 24 hours)   CBC Auto Differential    Collection Time: 10/18/24 12:09 PM    Specimen: Blood   Result Value Ref Range    WBC 17.06 (H) 3.40 - 10.80 10*3/mm3    RBC 4.02 3.77 - 5.28 10*6/mm3    Hemoglobin 12.1 12.0 - 15.9 g/dL    Hematocrit 36.8 34.0 - 46.6 %    MCV 91.5 79.0 - 97.0 fL    MCH 30.1 26.6 - 33.0 pg    MCHC 32.9 31.5 - 35.7 g/dL    RDW 12.1 (L) 12.3 - 15.4 %    RDW-SD 40.4 37.0 - 54.0 fl    MPV 12.6 (H) 6.0 - 12.0 fL    Platelets 190 140 - 450 10*3/mm3    Neutrophil % 93.2 (H) 42.7 - 76.0 %    Lymphocyte  % 3.8 (L) 19.6 - 45.3 %    Monocyte % 2.1 (L) 5.0 - 12.0 %    Eosinophil % 0.1 (L) 0.3 - 6.2 %    Basophil % 0.4 0.0 - 1.5 %    Immature Grans % 0.4 0.0 - 0.5 %    Neutrophils, Absolute 15.91 (H) 1.70 - 7.00 10*3/mm3    Lymphocytes, Absolute 0.64 (L) 0.70 - 3.10 10*3/mm3    Monocytes, Absolute 0.36 0.10 - 0.90 10*3/mm3    Eosinophils, Absolute 0.01 0.00 - 0.40 10*3/mm3    Basophils, Absolute 0.07 0.00 - 0.20 10*3/mm3    Immature Grans, Absolute 0.07 (H) 0.00 - 0.05 10*3/mm3    nRBC 0.0 0.0 - 0.2 /100 WBC   Comprehensive Metabolic Panel    Collection Time: 10/18/24  5:11 PM    Specimen: Blood   Result Value Ref Range    Glucose 359 (H) 65 - 99 mg/dL    BUN 24 (H) 8 - 23 mg/dL    Creatinine 1.02 (H) 0.57 - 1.00 mg/dL    Sodium 136 136 - 145 mmol/L    Potassium 5.0 3.5 - 5.2 mmol/L    Chloride 101 98 - 107 mmol/L    CO2 19.4 (L) 22.0 - 29.0 mmol/L    Calcium 9.7 8.6 - 10.5 mg/dL    Total Protein 6.9 6.0 - 8.5 g/dL    Albumin 3.7 3.5 - 5.2 g/dL    ALT (SGPT) 12 1 - 33 U/L    AST (SGOT) 91 (H) 1 - 32 U/L    Alkaline Phosphatase 85 39 - 117 U/L    Total Bilirubin 0.4 0.0 - 1.2 mg/dL    Globulin 3.2 gm/dL    A/G Ratio 1.2 g/dL    BUN/Creatinine Ratio 23.5 7.0 - 25.0    Anion Gap 15.6 (H) 5.0 - 15.0 mmol/L    eGFR 55.0 (L) >60.0 mL/min/1.73   BNP    Collection Time: 10/18/24  5:11 PM    Specimen: Blood   Result Value Ref Range    proBNP 14,133.0 (H) 0.0 - 1,800.0 pg/mL   Single High Sensitivity Troponin T    Collection Time: 10/18/24  5:11 PM    Specimen: Blood   Result Value Ref Range    HS Troponin T 1,522 (C) <14 ng/L   Green Top (Gel)    Collection Time: 10/18/24  5:11 PM   Result Value Ref Range    Extra Tube Hold for add-ons.    Lavender Top    Collection Time: 10/18/24  5:11 PM   Result Value Ref Range    Extra Tube hold for add-on    Gold Top - SST    Collection Time: 10/18/24  5:11 PM   Result Value Ref Range    Extra Tube Hold for add-ons.    Light Blue Top    Collection Time: 10/18/24  5:11 PM   Result Value Ref  Range    Extra Tube Hold for add-ons.    CBC Auto Differential    Collection Time: 10/18/24  5:11 PM    Specimen: Blood   Result Value Ref Range    WBC 14.31 (H) 3.40 - 10.80 10*3/mm3    RBC 4.05 3.77 - 5.28 10*6/mm3    Hemoglobin 12.2 12.0 - 15.9 g/dL    Hematocrit 37.0 34.0 - 46.6 %    MCV 91.4 79.0 - 97.0 fL    MCH 30.1 26.6 - 33.0 pg    MCHC 33.0 31.5 - 35.7 g/dL    RDW 12.4 12.3 - 15.4 %    RDW-SD 40.8 37.0 - 54.0 fl    MPV 11.6 6.0 - 12.0 fL    Platelets 193 140 - 450 10*3/mm3    Neutrophil % 88.4 (H) 42.7 - 76.0 %    Lymphocyte % 5.9 (L) 19.6 - 45.3 %    Monocyte % 5.1 5.0 - 12.0 %    Eosinophil % 0.0 (L) 0.3 - 6.2 %    Basophil % 0.2 0.0 - 1.5 %    Immature Grans % 0.4 0.0 - 0.5 %    Neutrophils, Absolute 12.64 (H) 1.70 - 7.00 10*3/mm3    Lymphocytes, Absolute 0.85 0.70 - 3.10 10*3/mm3    Monocytes, Absolute 0.73 0.10 - 0.90 10*3/mm3    Eosinophils, Absolute 0.00 0.00 - 0.40 10*3/mm3    Basophils, Absolute 0.03 0.00 - 0.20 10*3/mm3    Immature Grans, Absolute 0.06 (H) 0.00 - 0.05 10*3/mm3    nRBC 0.0 0.0 - 0.2 /100 WBC   Respiratory Panel PCR w/COVID-19(SARS-CoV-2) DAR/OUMAR/ANDREW/PAD/COR/ANUPAM In-House, NP Swab in [facility]/VTM, 2 HR TAT - Swab, Nasopharynx    Collection Time: 10/18/24  5:11 PM    Specimen: Nasopharynx; Swab   Result Value Ref Range    ADENOVIRUS, PCR Not Detected Not Detected    Coronavirus 229E Not Detected Not Detected    Coronavirus HKU1 Not Detected Not Detected    Coronavirus NL63 Not Detected Not Detected    Coronavirus OC43 Not Detected Not Detected    COVID19 Not Detected Not Detected - Ref. Range    Human Metapneumovirus Not Detected Not Detected    Human Rhinovirus/Enterovirus Not Detected Not Detected    Influenza A PCR Not Detected Not Detected    Influenza B PCR Not Detected Not Detected    Parainfluenza Virus 1 Not Detected Not Detected    Parainfluenza Virus 2 Not Detected Not Detected    Parainfluenza Virus 3 Not Detected Not Detected    Parainfluenza Virus 4 Not Detected Not Detected     RSV, PCR Not Detected Not Detected    Bordetella pertussis pcr Not Detected Not Detected    Bordetella parapertussis PCR Not Detected Not Detected    Chlamydophila pneumoniae PCR Not Detected Not Detected    Mycoplasma pneumo by PCR Not Detected Not Detected   Procalcitonin    Collection Time: 10/18/24  5:11 PM    Specimen: Blood   Result Value Ref Range    Procalcitonin 0.29 (H) 0.00 - 0.25 ng/mL   ECG 12 Lead ED Triage Standing Order; SOA    Collection Time: 10/18/24  5:11 PM   Result Value Ref Range    QT Interval 422 ms    QTC Interval 493 ms         RADIOLOGY  XR Chest 1 View    Result Date: 10/18/2024  XR CHEST 1 VW-  Clinical: Shortness of breath  FINDINGS: Cardiac size within normal limits, there is atherosclerotic calcification of the aorta. There is vascular congestion with vague opacity at both lung bases, greater on the right than the left, likely associated pleural effusion effusions. There is bibasilar atelectasis and/or infiltrate, pneumonia not excluded. Remainder is unremarkable.  This report was finalized on 10/18/2024 5:47 PM by Dr. Adiel Mason M.D on Workstation: [id number]         MEDICATIONS GIVEN IN ER  Medications   sodium chloride 0.9 % flush 10 mL (has no administration in time range)   sodium chloride 0.9 % flush 10 mL (has no administration in time range)   levoFLOXacin (LEVAQUIN) tablet 750 mg (has no administration in time range)   ipratropium-albuterol (DUO-NEB) nebulizer solution 3 mL (3 mL Nebulization Given 10/18/24 1751)   methylPREDNISolone sodium succinate (SOLU-Medrol) injection 60 mg (60 mg Intravenous Given 10/18/24 1841)   furosemide (LASIX) injection 80 mg (80 mg Intravenous Given 10/18/24 1842)   aspirin chewable tablet 324 mg (324 mg Oral Given 10/18/24 1855)   Enoxaparin Sodium (LOVENOX) syringe 60 mg (60 mg Subcutaneous Given 10/18/24 1855)         ORDERS PLACED DURING THIS VISIT:  Orders Placed This Encounter   Procedures    Respiratory Panel PCR  w/COVID-19(SARS-CoV-2) DAR/OUMAR/ANDREW/PAD/COR/ANUPAM In-House, NP Swab in UTM/VTM, 2 HR TAT - Swab, Nasopharynx    Blood Culture - Blood,    Blood Culture - Blood,    XR Chest 1 View    [place] Draw    Comprehensive Metabolic Panel    BNP    Single High Sensitivity Troponin T    CBC Auto Differential    Procalcitonin    Lactic Acid, Plasma    High Sensitivity Troponin T 2Hr    NPO Diet NPO Type: Strict NPO    Undress & Gown    Continuous Pulse Oximetry    Vital Signs    IP General Consult (Use specialty-specific consult if known)    Cardiology (on-call MD unless specified)    Oxygen Therapy- Nasal Cannula; Titrate 1-6 LPM Per SpO2; 90 - 95%    ECG 12 Lead ED Triage Standing Order; SOA    ECG 12 Lead Chest Pain    Insert Peripheral IV    Insert Peripheral IV    Inpatient Admission    CBC & Differential    Green Top (Gel)    Lavender Top    Gold Top - SST    Light Blue Top         OUTPATIENT MEDICATION MANAGEMENT:  Current Facility-Administered Medications Ordered in Epic   Medication Dose Route Frequency Provider Last Rate Last Admin    levoFLOXacin (LEVAQUIN) tablet 750 mg  750 mg Oral Once Monique Mckinney PA-C        sodium chloride 0.9 % flush 10 mL  10 mL Intravenous PRN Reed Aldana II, MD        sodium chloride 0.9 % flush 10 mL  10 mL Intravenous PRN Monique Mckinney PA-C         Current Outpatient Medications Ordered in Epic   Medication Sig Dispense Refill    acetaminophen (TYLENOL) 500 MG tablet Take 1 tablet by mouth 2 (Two) Times a Day.      amLODIPine (NORVASC) 10 MG tablet Take 1 tablet by mouth Daily.      atorvastatin (LIPITOR) 20 MG tablet Take 0.5 tablets by mouth Daily.      donepezil (ARICEPT) 10 MG tablet Take 1 tablet by mouth Every Night.      Dulaglutide (Trulicity) 3 MG/0.5ML solution pen-injector Inject 0.5 mL under the skin into the appropriate area as directed 1 (One) Time Per Week. Thurs      furosemide (LASIX) 10 MG/ML injection Inject 4 mL into the appropriate muscle as directed  by prescriber 1 (One) Time.      Glucagon 3 MG/DOSE powder Administer 1 spray into the nostril(s) as directed by provider As Needed (hypoglycemia).      hydrALAZINE (APRESOLINE) 50 MG tablet Take 1 tablet by mouth 3 (Three) Times a Day.      Insulin Glargine (BASAGLAR KWIKPEN) 100 UNIT/ML injection pen Inject 14 Units under the skin into the appropriate area as directed Daily.      ipratropium-albuterol (DUO-NEB) 0.5-2.5 mg/3 ml nebulizer Take 3 mL by nebulization 2 (Two) Times a Day.      losartan (COZAAR) 100 MG tablet Take 1 tablet by mouth Daily.      meclizine (ANTIVERT) 12.5 MG tablet Take 1 tablet by mouth 2 (Two) Times a Day.      memantine (NAMENDA) 10 MG tablet Take 1 tablet by mouth 2 (Two) Times a Day.      metFORMIN (GLUCOPHAGE) 500 MG tablet Take 2 tablets by mouth 2 (Two) Times a Day With Meals.      metoprolol succinate XL (TOPROL-XL) 100 MG 24 hr tablet Take 1 tablet by mouth Daily.      polyethylene glycol (MIRALAX) 17 g packet Take 17 g by mouth 2 (Two) Times a Day.      sennosides-docusate (senna-docusate sodium) 8.6-50 MG per tablet Take 2 tablets by mouth Daily.      sodium chloride 1 g tablet Take 1 tablet by mouth 2 (Two) Times a Day.      glucagon, human recombinant, (GLUCAGEN DIAGNOSTIC) 1 MG injection Inject 1 mg under the skin into the appropriate area as directed 1 (One) Time.      loperamide (IMODIUM) 2 MG capsule Take 1 capsule by mouth 4 (Four) Times a Day As Needed for Diarrhea.      magnesium hydroxide (MILK OF MAGNESIA) 400 MG/5ML suspension Take 30 mL by mouth Daily As Needed for Constipation.           38 minutes of critical care provided. This time excludes other billable procedures. Time does include preparation of documents, medical consultations, review of old records, and direct bedside care. Patient is at high risk for life-threatening deterioration due to hypoxemic respiratory failure, ACS, hypervolemia requiring consultation with cardiology, IV Lovenox, IV antibiotics, IV  Lasix, and admission to the hospital.         PROGRESS, DATA ANALYSIS, CONSULTS, AND MEDICAL DECISION MAKING  All labs have been independently interpreted by me.  All radiology studies have been reviewed by me. All EKG's have been independently viewed and interpreted by me.  Discussion below represents my analysis of pertinent findings related to patient's condition, differential diagnosis, treatment plan and final disposition.    Differential diagnosis includes but is not limited to pneumonia, bronchitis, asthma, COPD.        ED Course as of 10/18/24 1913   Fri Oct 18, 2024   1724 Chest x-ray independently interpreted by me as no pneumothorax [MP]   1738 WBC(!): 14.31 [MP]   1738 Hemoglobin: 12.2 [MP]   1754 proBNP(!): 14,133.0 [TD]   1755 Chest x-ray dependently interpreted by myself.  Vascular congestion noted.  There are hazy opacities at the lung bases bilaterally more prominent on the right. [TD]   1756 BUN(!): 24 [MP]   1756 Creatinine(!): 1.02 [MP]   1756 proBNP(!): 14,133.0 [MP]   1805 I spoke with Dr. Armas.  Discussed patient presentation and ED findings.  He agrees admit patient to a telemetry bed.  Cardiology consult pending. [MP]   1832 EKG independently interpreted by myself.  Time 5:11 PM.  Sinus rhythm.  Heart rate 82.  She has ST elevation in the inferior leads although this is a poor tracing. [TD]   1838 I spoke with Dr. Braga with cardiology. Start ASA, SQ lovenox, can give atorvastatin. Hold BP meds.  Requesting a repeat EKG now.  They will see patient in consultation. [MP]      ED Course User Index  [MP] Monique Mckinney PA-C  [TD] Reed Aldana II, MD             AS OF 19:13 EDT VITALS:    BP - 120/79  HR - 90  TEMP - 98.2 °F (36.8 °C)  O2 SATS - 91%    COMPLEXITY OF CARE  The patient requires admission.      DIAGNOSIS  Final diagnoses:   Acute hypoxemic respiratory failure   Hypervolemia, unspecified hypervolemia type   Elevated brain natriuretic peptide (BNP) level    Hyperglycemia due to diabetes mellitus   Elevated troponin         DISPOSITION  ED Disposition       ED Disposition   Decision to Admit    Condition   --    Comment   Level of Care: Telemetry [5]   Diagnosis: Acute hypoxemic respiratory failure [[id number]]   Admitting Physician: ENDER BRODY [[id number]]   Attending Physician: ENDER BRODY [[id number]]   Certification: I Certify That Inpatient Hospital Services Are Medically Necessary For Greater Than 2 Midnights                  Please note that portions of this document were completed with a voice recognition program.    Note Disclaimer: At [facility], we believe that sharing information builds trust and better relationships. You are receiving this note because you recently visited [facility]. It is possible you will see health information before a provider has talked with you about it. This kind of information can be easy to misunderstand. To help you fully understand what it means for your health, we urge you to discuss this note with your provider.     Monique Mckinney PA-C  10/18/24 1914

## 2024-10-18 NOTE — ED NOTES
Nursing report ED to floor  Kecia Martinez  84 y.o.  female    HPI :  HPI  Stated Reason for Visit: soa  History Obtained From: EMS, transferring facility  Duration (Days): 1    Chief Complaint  Chief Complaint   Patient presents with    Shortness of Breath       Admitting doctor:   Ministerio Armas MD    Admitting diagnosis:   The primary encounter diagnosis was Acute hypoxemic respiratory failure. Diagnoses of Hypervolemia, unspecified hypervolemia type, Elevated brain natriuretic peptide (BNP) level, Hyperglycemia due to diabetes mellitus, and Elevated troponin were also pertinent to this visit.    Code status:   Current Code Status       Date Active Code Status Order ID Comments User Context       Prior            Allergies:   Penicillins, Sulfa antibiotics, and Sulfanilamide    Isolation:   No active isolations    Intake and Output  No intake or output data in the 24 hours ending 10/18/24 1813    Weight:       10/18/24  1657   Weight: 56 kg (123 lb 7.3 oz)       Most recent vitals:   Vitals:    10/18/24 1741 10/18/24 1742 10/18/24 1743 10/18/24 1751   BP:       Pulse: 87 85 85 87   Resp:    18   Temp:       SpO2: (!) 89% (!) 89% 94% 96%   Weight:       Height:           Active LDAs/IV Access:   Lines, Drains & Airways       Active LDAs       Name Placement date Placement time Site Days    Peripheral IV 10/18/24 1717 Anterior;Left Forearm 10/18/24  1717  Forearm  less than 1                    Labs (abnormal labs have a star):   Labs Reviewed   COMPREHENSIVE METABOLIC PANEL - Abnormal; Notable for the following components:       Result Value    Glucose 359 (*)     BUN 24 (*)     Creatinine 1.02 (*)     CO2 19.4 (*)     AST (SGOT) 91 (*)     Anion Gap 15.6 (*)     eGFR 55.0 (*)     All other components within normal limits    Narrative:     GFR Normal >60  Chronic Kidney Disease <60  Kidney Failure <15    The GFR formula is only valid for adults with stable renal function between ages 18 and 70.   BNP (IN-HOUSE) -  Abnormal; Notable for the following components:    proBNP 14,133.0 (*)     All other components within normal limits    Narrative:     This assay is used as an aid in the diagnosis of individuals suspected of having heart failure. It can be used as an aid in the diagnosis of acute decompensated heart failure (ADHF) in patients presenting with signs and symptoms of ADHF to the emergency department (ED). In addition, NT-proBNP of <300 pg/mL indicates ADHF is not likely.    Age Range Result Interpretation  NT-proBNP Concentration (pg/mL:      <50             Positive            >450                   Gray                 300-450                    Negative             <300    50-75           Positive            >900                  Gray                300-900                  Negative            <300      >75             Positive            >1800                  Gray                300-1800                  Negative            <300   SINGLE HS TROPONIN T - Abnormal; Notable for the following components:    HS Troponin T 1,522 (*)     All other components within normal limits    Narrative:     High Sensitive Troponin T Reference Range:  <14.0 ng/L- Negative Female for AMI  <22.0 ng/L- Negative Male for AMI  >=14 - Abnormal Female indicating possible myocardial injury.  >=22 - Abnormal Male indicating possible myocardial injury.   Clinicians would have to utilize clinical acumen, EKG, Troponin, and serial changes to determine if it is an Acute Myocardial Infarction or myocardial injury due to an underlying chronic condition.        CBC WITH AUTO DIFFERENTIAL - Abnormal; Notable for the following components:    WBC 14.31 (*)     Neutrophil % 88.4 (*)     Lymphocyte % 5.9 (*)     Eosinophil % 0.0 (*)     Neutrophils, Absolute 12.64 (*)     Immature Grans, Absolute 0.06 (*)     All other components within normal limits   RESPIRATORY PANEL PCR W/ COVID-19 (SARS-COV-2), NP SWAB IN UTM/VTP, 2 HR TAT   BLOOD CULTURE   BLOOD  CULTURE   RAINBOW DRAW    Narrative:     The following orders were created for panel order [place] Draw.  Procedure                               Abnormality         Status                     ---------                               -----------         ------                     Green Top (Gel)[[id number]]                                  Final result               Lavender Top[[id number]]                                     Final result               Gold Top - SST[[id number]]                                   Final result               Light Blue Top[[id number]]                                   Final result                 Please view results for these tests on the individual orders.   PROCALCITONIN   LACTIC ACID, PLASMA   HIGH SENSITIVITIY TROPONIN T 2HR   CBC AND DIFFERENTIAL    Narrative:     The following orders were created for panel order CBC & Differential.  Procedure                               Abnormality         Status                     ---------                               -----------         ------                     CBC Auto Differential[[id number]]        Abnormal            Final result                 Please view results for these tests on the individual orders.   GREEN TOP   LAVENDER TOP   GOLD TOP - SST   LIGHT BLUE TOP       EKG:   ECG 12 Lead ED Triage Standing Order; SOA   Preliminary Result   HEART RATE=82  bpm   RR Rbzsqwpv=640  ms   CA Fudrfspj=469  ms   P Horizontal Axis=-34  deg   P Front Axis=65  deg   QRSD Mnagwmhq=820  ms   QT Fkjgibeo=966  ms   XHaH=677  ms   QRS Axis=0  deg   T Wave Axis=-9  deg   - ABNORMAL ECG -   Sinus rhythm   Anterior infarct, old   Repol abnrm suggests ischemia, diffuse leads   ST elevation, consider inferior injury   Date and Time of Study:2024-10-18 17:11:14          Meds given in ED:   Medications   sodium chloride 0.9 % flush 10 mL (has no administration in time range)   sodium chloride 0.9 % flush 10 mL (has no administration in time range)    methylPREDNISolone sodium succinate (SOLU-Medrol) injection 60 mg (has no administration in time range)   levoFLOXacin (LEVAQUIN) tablet 750 mg (has no administration in time range)   furosemide (LASIX) injection 80 mg (has no administration in time range)   ipratropium-albuterol (DUO-NEB) nebulizer solution 3 mL (3 mL Nebulization Given 10/18/24 3134)       Imaging results:  No radiology results for the last day    Ambulatory status:   - pt has not ambulated in ed    Social issues:   Social History     Socioeconomic History    Marital status:    Tobacco Use    Smoking status: Unknown     Passive exposure: Past   Vaping Use    Vaping status: Unknown   Substance and Sexual Activity    Alcohol use: Defer    Drug use: Defer    Sexual activity: Defer       Peripheral Neurovascular  Peripheral Neurovascular (Adult)  Peripheral Neurovascular WDL: WDL    Neuro Cognitive  Neuro Cognitive (Adult)  Cognitive/Neuro/Behavioral WDL: .WDL except, orientation  Orientation: disoriented to, time, situation, place    Learning  Learning Assessment  Learning Readiness and Ability: no barriers identified  Education Provided  Person Taught: patient    Respiratory  Respiratory WDL  Respiratory WDL: WDL  Breath Sounds Post-Respiratory Treatment  Breath Sounds Posttreatment All Fields: All Fields  Breath Sounds Posttreatment All Fields: diminished    Abdominal Pain       Pain Assessments  Pain (Adult)  (0-10) Pain Rating: Rest: 0  (0-10) Pain Rating: Activity: 0    NIH Stroke Scale       Alana Brown RN  10/18/24 18:13 EDT

## 2024-10-18 NOTE — CASE MANAGEMENT/SOCIAL WORK
Discharge Planning Assessment  [facility]     Patient Name: Kecia Martinez  MRN: [id number]  Today's Date: 10/18/2024    Admit Date: 10/18/2024        Discharge Needs Assessment    No documentation.                  Discharge Plan       Row Name 10/18/24 2963       Plan    Plan Comments Notified by registration, Constanza, that the patient has a Legal Guardian.  She spoke with Jony Joseph from [facility] [employer].  Primary nurse notified.  [facility] is in place, Guardianship papers are uploaded and Case Management manager, Michelle, notified.  ISA Ramirez RN                  Continued Care and Services - Admitted Since 10/18/2024    No active coordination exists for this encounter.          Demographic Summary    No documentation.                  Functional Status    No documentation.                  Psychosocial    No documentation.                  Abuse/Neglect    No documentation.                  Legal    No documentation.                  Substance Abuse    No documentation.                  Patient Forms    No documentation.                     Sejal Leary, RN

## 2024-10-18 NOTE — ED PROVIDER NOTES
MD ATTESTATION NOTE    SHARED VISIT: This visit was performed by BOTH a physician and an APC. The substantive portion of the medical decision making was performed by this attesting physician who made or approved the management plan and takes responsibility for patient management. All studies documented in the APC note (if performed) were independently interpreted by me.    The SUSU and I have discussed this patient's history, physical exam, MDM, and treatment plan.  I have reviewed the documentation and personally had a face to face interaction with the patient. The attached note describes my personal findings.      Kecia Martinez is a 84 y.o. female who presents to the ED c/o acute shortness of breath.  She is a poor historian due to her dementia.  Patient noted to have low sats that her facility earlier today.  She is placed on 3 L nasal cannula.  She denies having chest pain.    On exam:  GENERAL: not distressed  HENT: nares patent  EYES: no scleral icterus  CV: regular rhythm, regular rate abdomen no murmur  RESPIRATORY: normal effort, speaks in full sentences, bilateral wheezing  ABDOMEN: soft, nontender  MUSCULOSKELETAL: no deformity, 2+ edema to the lower legs bilaterally  NEURO: alert, moves all extremities, follows commands  SKIN: warm, dry    Labs  Recent Results (from the past 24 hours)   CBC Auto Differential    Collection Time: 10/18/24 12:09 PM    Specimen: Blood   Result Value Ref Range    WBC 17.06 (H) 3.40 - 10.80 10*3/mm3    RBC 4.02 3.77 - 5.28 10*6/mm3    Hemoglobin 12.1 12.0 - 15.9 g/dL    Hematocrit 36.8 34.0 - 46.6 %    MCV 91.5 79.0 - 97.0 fL    MCH 30.1 26.6 - 33.0 pg    MCHC 32.9 31.5 - 35.7 g/dL    RDW 12.1 (L) 12.3 - 15.4 %    RDW-SD 40.4 37.0 - 54.0 fl    MPV 12.6 (H) 6.0 - 12.0 fL    Platelets 190 140 - 450 10*3/mm3    Neutrophil % 93.2 (H) 42.7 - 76.0 %    Lymphocyte % 3.8 (L) 19.6 - 45.3 %    Monocyte % 2.1 (L) 5.0 - 12.0 %    Eosinophil % 0.1 (L) 0.3 - 6.2 %    Basophil % 0.4 0.0 - 1.5 %     Immature Grans % 0.4 0.0 - 0.5 %    Neutrophils, Absolute 15.91 (H) 1.70 - 7.00 10*3/mm3    Lymphocytes, Absolute 0.64 (L) 0.70 - 3.10 10*3/mm3    Monocytes, Absolute 0.36 0.10 - 0.90 10*3/mm3    Eosinophils, Absolute 0.01 0.00 - 0.40 10*3/mm3    Basophils, Absolute 0.07 0.00 - 0.20 10*3/mm3    Immature Grans, Absolute 0.07 (H) 0.00 - 0.05 10*3/mm3    nRBC 0.0 0.0 - 0.2 /100 WBC   Comprehensive Metabolic Panel    Collection Time: 10/18/24  5:11 PM    Specimen: Blood   Result Value Ref Range    Glucose 359 (H) 65 - 99 mg/dL    BUN 24 (H) 8 - 23 mg/dL    Creatinine 1.02 (H) 0.57 - 1.00 mg/dL    Sodium 136 136 - 145 mmol/L    Potassium 5.0 3.5 - 5.2 mmol/L    Chloride 101 98 - 107 mmol/L    CO2 19.4 (L) 22.0 - 29.0 mmol/L    Calcium 9.7 8.6 - 10.5 mg/dL    Total Protein 6.9 6.0 - 8.5 g/dL    Albumin 3.7 3.5 - 5.2 g/dL    ALT (SGPT) 12 1 - 33 U/L    AST (SGOT) 91 (H) 1 - 32 U/L    Alkaline Phosphatase 85 39 - 117 U/L    Total Bilirubin 0.4 0.0 - 1.2 mg/dL    Globulin 3.2 gm/dL    A/G Ratio 1.2 g/dL    BUN/Creatinine Ratio 23.5 7.0 - 25.0    Anion Gap 15.6 (H) 5.0 - 15.0 mmol/L    eGFR 55.0 (L) >60.0 mL/min/1.73   BNP    Collection Time: 10/18/24  5:11 PM    Specimen: Blood   Result Value Ref Range    proBNP 14,133.0 (H) 0.0 - 1,800.0 pg/mL   Single High Sensitivity Troponin T    Collection Time: 10/18/24  5:11 PM    Specimen: Blood   Result Value Ref Range    HS Troponin T 1,522 (C) <14 ng/L   Green Top (Gel)    Collection Time: 10/18/24  5:11 PM   Result Value Ref Range    Extra Tube Hold for add-ons.    Lavender Top    Collection Time: 10/18/24  5:11 PM   Result Value Ref Range    Extra Tube hold for add-on    Gold Top - SST    Collection Time: 10/18/24  5:11 PM   Result Value Ref Range    Extra Tube Hold for add-ons.    Light Blue Top    Collection Time: 10/18/24  5:11 PM   Result Value Ref Range    Extra Tube Hold for add-ons.    CBC Auto Differential    Collection Time: 10/18/24  5:11 PM    Specimen: Blood    Result Value Ref Range    WBC 14.31 (H) 3.40 - 10.80 10*3/mm3    RBC 4.05 3.77 - 5.28 10*6/mm3    Hemoglobin 12.2 12.0 - 15.9 g/dL    Hematocrit 37.0 34.0 - 46.6 %    MCV 91.4 79.0 - 97.0 fL    MCH 30.1 26.6 - 33.0 pg    MCHC 33.0 31.5 - 35.7 g/dL    RDW 12.4 12.3 - 15.4 %    RDW-SD 40.8 37.0 - 54.0 fl    MPV 11.6 6.0 - 12.0 fL    Platelets 193 140 - 450 10*3/mm3    Neutrophil % 88.4 (H) 42.7 - 76.0 %    Lymphocyte % 5.9 (L) 19.6 - 45.3 %    Monocyte % 5.1 5.0 - 12.0 %    Eosinophil % 0.0 (L) 0.3 - 6.2 %    Basophil % 0.2 0.0 - 1.5 %    Immature Grans % 0.4 0.0 - 0.5 %    Neutrophils, Absolute 12.64 (H) 1.70 - 7.00 10*3/mm3    Lymphocytes, Absolute 0.85 0.70 - 3.10 10*3/mm3    Monocytes, Absolute 0.73 0.10 - 0.90 10*3/mm3    Eosinophils, Absolute 0.00 0.00 - 0.40 10*3/mm3    Basophils, Absolute 0.03 0.00 - 0.20 10*3/mm3    Immature Grans, Absolute 0.06 (H) 0.00 - 0.05 10*3/mm3    nRBC 0.0 0.0 - 0.2 /100 WBC   Respiratory Panel PCR w/COVID-19(SARS-CoV-2) DAR/OUMAR/ANDREW/PAD/COR/ANUPAM In-House, NP Swab in UTM/VTM, 2 HR TAT - Swab, Nasopharynx    Collection Time: 10/18/24  5:11 PM    Specimen: Nasopharynx; Swab   Result Value Ref Range    ADENOVIRUS, PCR Not Detected Not Detected    Coronavirus 229E Not Detected Not Detected    Coronavirus HKU1 Not Detected Not Detected    Coronavirus NL63 Not Detected Not Detected    Coronavirus OC43 Not Detected Not Detected    COVID19 Not Detected Not Detected - Ref. Range    Human Metapneumovirus Not Detected Not Detected    Human Rhinovirus/Enterovirus Not Detected Not Detected    Influenza A PCR Not Detected Not Detected    Influenza B PCR Not Detected Not Detected    Parainfluenza Virus 1 Not Detected Not Detected    Parainfluenza Virus 2 Not Detected Not Detected    Parainfluenza Virus 3 Not Detected Not Detected    Parainfluenza Virus 4 Not Detected Not Detected    RSV, PCR Not Detected Not Detected    Bordetella pertussis pcr Not Detected Not Detected    Bordetella parapertussis  PCR Not Detected Not Detected    Chlamydophila pneumoniae PCR Not Detected Not Detected    Mycoplasma pneumo by PCR Not Detected Not Detected   ECG 12 Lead ED Triage Standing Order; SOA    Collection Time: 10/18/24  5:11 PM   Result Value Ref Range    QT Interval 422 ms    QTC Interval 493 ms       Radiology  XR Chest 1 View    Result Date: 10/18/2024  XR CHEST 1 VW-  Clinical: Shortness of breath  FINDINGS: Cardiac size within normal limits, there is atherosclerotic calcification of the aorta. There is vascular congestion with vague opacity at both lung bases, greater on the right than the left, likely associated pleural effusion effusions. There is bibasilar atelectasis and/or infiltrate, pneumonia not excluded. Remainder is unremarkable.  This report was finalized on 10/18/2024 5:47 PM by Dr. Adiel Mason M.D on Workstation: [id number]       Medications given in the ED:  Medications   sodium chloride 0.9 % flush 10 mL (has no administration in time range)   sodium chloride 0.9 % flush 10 mL (has no administration in time range)   methylPREDNISolone sodium succinate (SOLU-Medrol) injection 60 mg (has no administration in time range)   levoFLOXacin (LEVAQUIN) tablet 750 mg (has no administration in time range)   furosemide (LASIX) injection 80 mg (has no administration in time range)   ipratropium-albuterol (DUO-NEB) nebulizer solution 3 mL (3 mL Nebulization Given 10/18/24 1751)       Orders placed during this visit:  Orders Placed This Encounter   Procedures   • Respiratory Panel PCR w/COVID-19(SARS-CoV-2) DAR/OUMAR/ANDREW/PAD/COR/ANUPAM In-House, NP Swab in UTM/VTM, 2 HR TAT - Swab, Nasopharynx   • Blood Culture - Blood,   • Blood Culture - Blood,   • XR Chest 1 View   • [place] Draw   • Comprehensive Metabolic Panel   • BNP   • Single High Sensitivity Troponin T   • CBC Auto Differential   • Procalcitonin   • Lactic Acid, Plasma   • High Sensitivity Troponin T 2Hr   • NPO Diet NPO Type: Strict NPO   • Undress &  Gown   • Continuous Pulse Oximetry   • Vital Signs   • IP General Consult (Use specialty-specific consult if known)   • Cardiology (on-call MD unless specified)   • Oxygen Therapy- Nasal Cannula; Titrate 1-6 LPM Per SpO2; 90 - 95%   • ECG 12 Lead ED Triage Standing Order; SOA   • Insert Peripheral IV   • Insert Peripheral IV   • Inpatient Admission   • CBC & Differential   • Green Top (Gel)   • Lavender Top   • Gold Top - SST   • Light Blue Top       Medical Decision Making:  ED Course as of 10/18/24 2335   Fri Oct 18, 2024   1724 Chest x-ray independently interpreted by me as no pneumothorax [MP]   1738 WBC(!): 14.31 [MP]   1738 Hemoglobin: 12.2 [MP]   1754 proBNP(!): 14,133.0 [TD]   1755 Chest x-ray dependently interpreted by myself.  Vascular congestion noted.  There are hazy opacities at the lung bases bilaterally more prominent on the right. [TD]   1756 BUN(!): 24 [MP]   1756 Creatinine(!): 1.02 [MP]   1756 proBNP(!): 14,133.0 [MP]   1805 I spoke with Dr. Armas.  Discussed patient presentation and ED findings.  He agrees admit patient to a telemetry bed.  Cardiology consult pending. [MP]   1832 EKG independently interpreted by myself.  Time 5:11 PM.  Sinus rhythm.  Heart rate 82.  She has ST elevation in the inferior leads although this is a poor tracing. [TD]   1838 I spoke with Dr. Braga with cardiology. Start ASA, SQ lovenox, can give atorvastatin. Hold BP meds.  Requesting a repeat EKG now.  They will see patient in consultation. [MP]   1928 Repeat EKG independently interpreted by myself.  Time 60 4 PM.  Sinus rhythm.  Heart rate 96.  Low voltage in the limb leads.  Prolonged R wave progression.  There is minimal ST elevation in lead III. [TD]      ED Course User Index  [MP] Monique Mckinney PA-C  [TD] Reed Aldana II, MD       Differential diagnosis:  Differential diagnosis includes but not limited to CHF, acute coronary syndrome, pulmonary embolism, pneumothorax, pneumonia, asthma/COPD,  pulmonary hypertension, deconditioning, anemia, other hematologic etiologies such as CO poisoning, anxiety.         Diagnosis  Final diagnoses:   Acute hypoxemic respiratory failure   Hypervolemia, unspecified hypervolemia type   Elevated brain natriuretic peptide (BNP) level   Hyperglycemia due to diabetes mellitus   Elevated troponin          Reed Aldana II, MD  10/18/24 0100       Reed Aldana II, MD  10/18/24 4723

## 2024-10-18 NOTE — ED NOTES
Pt arrives via ems from [facility] for shortness of breath. Pt wears 3L at baseline and receives breathing treatments 2 times a day. EMS found pt on 88%reports expiratory wheezes and gave a duo-neb and an albuterol treatment en route. Pt is on 6L at triage.   Pt is A&Ox2 at baseline

## 2024-10-19 ENCOUNTER — APPOINTMENT (OUTPATIENT)
Dept: GENERAL RADIOLOGY | Facility: HOSPITAL | Age: 84
End: 2024-10-19
Payer: MEDICARE

## 2024-10-19 ENCOUNTER — APPOINTMENT (OUTPATIENT)
Dept: CARDIOLOGY | Facility: HOSPITAL | Age: 84
End: 2024-10-19
Payer: MEDICARE

## 2024-10-19 LAB
ANION GAP SERPL CALCULATED.3IONS-SCNC: 11.6 MMOL/L (ref 5–15)
ASCENDING AORTA: 3 CM
BASOPHILS # BLD AUTO: 0.01 10*3/MM3 (ref 0–0.2)
BASOPHILS NFR BLD AUTO: 0.1 % (ref 0–1.5)
BH CV ECHO MEAS - ACS: 1.52 CM
BH CV ECHO MEAS - AO MAX PG: 5.1 MMHG
BH CV ECHO MEAS - AO MEAN PG: 3.1 MMHG
BH CV ECHO MEAS - AO ROOT DIAM: 2.8 CM
BH CV ECHO MEAS - AO V2 MAX: 112.5 CM/SEC
BH CV ECHO MEAS - AO V2 VTI: 26.6 CM
BH CV ECHO MEAS - AVA(I,D): 1.91 CM2
BH CV ECHO MEAS - EDV(CUBED): 126.6 ML
BH CV ECHO MEAS - EDV(MOD-SP2): 155 ML
BH CV ECHO MEAS - EDV(MOD-SP4): 108 ML
BH CV ECHO MEAS - EF(MOD-BP): 52.1 %
BH CV ECHO MEAS - EF(MOD-SP2): 43.9 %
BH CV ECHO MEAS - EF(MOD-SP4): 55.6 %
BH CV ECHO MEAS - ESV(CUBED): 73.9 ML
BH CV ECHO MEAS - ESV(MOD-SP2): 87 ML
BH CV ECHO MEAS - ESV(MOD-SP4): 48 ML
BH CV ECHO MEAS - FS: 16.4 %
BH CV ECHO MEAS - IVS/LVPW: 0.89 CM
BH CV ECHO MEAS - IVSD: 0.78 CM
BH CV ECHO MEAS - LAT PEAK E' VEL: 5.6 CM/SEC
BH CV ECHO MEAS - LV DIASTOLIC VOL/BSA (35-75): 68.7 CM2
BH CV ECHO MEAS - LV MASS(C)D: 141.8 GRAMS
BH CV ECHO MEAS - LV MAX PG: 2.5 MMHG
BH CV ECHO MEAS - LV MEAN PG: 1.43 MMHG
BH CV ECHO MEAS - LV SYSTOLIC VOL/BSA (12-30): 30.5 CM2
BH CV ECHO MEAS - LV V1 MAX: 79.3 CM/SEC
BH CV ECHO MEAS - LV V1 VTI: 18.8 CM
BH CV ECHO MEAS - LVIDD: 5 CM
BH CV ECHO MEAS - LVIDS: 4.2 CM
BH CV ECHO MEAS - LVOT AREA: 2.7 CM2
BH CV ECHO MEAS - LVOT DIAM: 1.85 CM
BH CV ECHO MEAS - LVPWD: 0.87 CM
BH CV ECHO MEAS - MED PEAK E' VEL: 4.5 CM/SEC
BH CV ECHO MEAS - MR MAX PG: 53 MMHG
BH CV ECHO MEAS - MR MAX VEL: 363.9 CM/SEC
BH CV ECHO MEAS - MV A DUR: 0.12 SEC
BH CV ECHO MEAS - MV A MAX VEL: 66.7 CM/SEC
BH CV ECHO MEAS - MV DEC SLOPE: 684 CM/SEC2
BH CV ECHO MEAS - MV DEC TIME: 0.17 SEC
BH CV ECHO MEAS - MV E MAX VEL: 113 CM/SEC
BH CV ECHO MEAS - MV E/A: 1.69
BH CV ECHO MEAS - MV MAX PG: 7.6 MMHG
BH CV ECHO MEAS - MV MEAN PG: 2.25 MMHG
BH CV ECHO MEAS - MV P1/2T: 55.6 MSEC
BH CV ECHO MEAS - MV V2 VTI: 25.3 CM
BH CV ECHO MEAS - MVA(P1/2T): 4 CM2
BH CV ECHO MEAS - MVA(VTI): 2 CM2
BH CV ECHO MEAS - PA ACC TIME: 0.13 SEC
BH CV ECHO MEAS - PA V2 MAX: 68.8 CM/SEC
BH CV ECHO MEAS - RAP SYSTOLE: 3 MMHG
BH CV ECHO MEAS - RV MAX PG: 1.67 MMHG
BH CV ECHO MEAS - RV V1 MAX: 64.7 CM/SEC
BH CV ECHO MEAS - RV V1 VTI: 12.4 CM
BH CV ECHO MEAS - RVSP: 34 MMHG
BH CV ECHO MEAS - SV(LVOT): 50.8 ML
BH CV ECHO MEAS - SV(MOD-SP2): 68 ML
BH CV ECHO MEAS - SV(MOD-SP4): 60 ML
BH CV ECHO MEAS - SVI(LVOT): 32.3 ML/M2
BH CV ECHO MEAS - SVI(MOD-SP2): 43.2 ML/M2
BH CV ECHO MEAS - SVI(MOD-SP4): 38.1 ML/M2
BH CV ECHO MEAS - TAPSE (>1.6): 1.84 CM
BH CV ECHO MEAS - TR MAX PG: 30.4 MMHG
BH CV ECHO MEAS - TR MAX VEL: 275.5 CM/SEC
BH CV ECHO MEASUREMENTS AVERAGE E/E' RATIO: 22.38
BH CV XLRA - TDI S': 7.6 CM/SEC
BUN SERPL-MCNC: 25 MG/DL (ref 8–23)
BUN/CREAT SERPL: 22.5 (ref 7–25)
CALCIUM SPEC-SCNC: 8.7 MG/DL (ref 8.6–10.5)
CHLORIDE SERPL-SCNC: 101 MMOL/L (ref 98–107)
CO2 SERPL-SCNC: 25.4 MMOL/L (ref 22–29)
CREAT SERPL-MCNC: 1.11 MG/DL (ref 0.57–1)
D-LACTATE SERPL-SCNC: 1.2 MMOL/L (ref 0.5–2)
D-LACTATE SERPL-SCNC: 2.4 MMOL/L (ref 0.5–2)
DEPRECATED RDW RBC AUTO: 41.1 FL (ref 37–54)
EGFRCR SERPLBLD CKD-EPI 2021: 49.7 ML/MIN/1.73
EOSINOPHIL # BLD AUTO: 0 10*3/MM3 (ref 0–0.4)
EOSINOPHIL NFR BLD AUTO: 0 % (ref 0.3–6.2)
ERYTHROCYTE [DISTWIDTH] IN BLOOD BY AUTOMATED COUNT: 12.6 % (ref 12.3–15.4)
GLUCOSE BLDC GLUCOMTR-MCNC: 143 MG/DL (ref 70–130)
GLUCOSE BLDC GLUCOMTR-MCNC: 182 MG/DL (ref 70–130)
GLUCOSE BLDC GLUCOMTR-MCNC: 227 MG/DL (ref 70–130)
GLUCOSE BLDC GLUCOMTR-MCNC: 87 MG/DL (ref 70–130)
GLUCOSE SERPL-MCNC: 187 MG/DL (ref 65–99)
HCT VFR BLD AUTO: 28.8 % (ref 34–46.6)
HGB BLD-MCNC: 9.8 G/DL (ref 12–15.9)
IMM GRANULOCYTES # BLD AUTO: 0.05 10*3/MM3 (ref 0–0.05)
IMM GRANULOCYTES NFR BLD AUTO: 0.4 % (ref 0–0.5)
LEFT ATRIUM VOLUME INDEX: 33.1 ML/M2
LYMPHOCYTES # BLD AUTO: 0.67 10*3/MM3 (ref 0.7–3.1)
LYMPHOCYTES NFR BLD AUTO: 5.9 % (ref 19.6–45.3)
MCH RBC QN AUTO: 30.7 PG (ref 26.6–33)
MCHC RBC AUTO-ENTMCNC: 34 G/DL (ref 31.5–35.7)
MCV RBC AUTO: 90.3 FL (ref 79–97)
MONOCYTES # BLD AUTO: 0.68 10*3/MM3 (ref 0.1–0.9)
MONOCYTES NFR BLD AUTO: 6 % (ref 5–12)
NEUTROPHILS NFR BLD AUTO: 87.6 % (ref 42.7–76)
NEUTROPHILS NFR BLD AUTO: 9.93 10*3/MM3 (ref 1.7–7)
NRBC BLD AUTO-RTO: 0 /100 WBC (ref 0–0.2)
PLATELET # BLD AUTO: 160 10*3/MM3 (ref 140–450)
PMV BLD AUTO: 11.7 FL (ref 6–12)
POTASSIUM SERPL-SCNC: 4.2 MMOL/L (ref 3.5–5.2)
QT INTERVAL: 353 MS
QT INTERVAL: 422 MS
QTC INTERVAL: 447 MS
QTC INTERVAL: 493 MS
RBC # BLD AUTO: 3.19 10*6/MM3 (ref 3.77–5.28)
SINUS: 2.46 CM
SODIUM SERPL-SCNC: 138 MMOL/L (ref 136–145)
STJ: 1.65 CM
TROPONIN T SERPL HS-MCNC: 3974 NG/L
WBC NRBC COR # BLD AUTO: 11.34 10*3/MM3 (ref 3.4–10.8)

## 2024-10-19 PROCEDURE — 94761 N-INVAS EAR/PLS OXIMETRY MLT: CPT

## 2024-10-19 PROCEDURE — 25010000002 FUROSEMIDE PER 20 MG: Performed by: INTERNAL MEDICINE

## 2024-10-19 PROCEDURE — 83605 ASSAY OF LACTIC ACID: CPT | Performed by: PHYSICIAN ASSISTANT

## 2024-10-19 PROCEDURE — 25510000001 PERFLUTREN 6.52 MG/ML SUSPENSION 2 ML VIAL: Performed by: INTERNAL MEDICINE

## 2024-10-19 PROCEDURE — 63710000001 INSULIN REGULAR HUMAN PER 5 UNITS: Performed by: INTERNAL MEDICINE

## 2024-10-19 PROCEDURE — 84484 ASSAY OF TROPONIN QUANT: CPT | Performed by: INTERNAL MEDICINE

## 2024-10-19 PROCEDURE — 93306 TTE W/DOPPLER COMPLETE: CPT | Performed by: INTERNAL MEDICINE

## 2024-10-19 PROCEDURE — 25010000002 CEFTRIAXONE PER 250 MG: Performed by: INTERNAL MEDICINE

## 2024-10-19 PROCEDURE — 85025 COMPLETE CBC W/AUTO DIFF WBC: CPT | Performed by: INTERNAL MEDICINE

## 2024-10-19 PROCEDURE — 93306 TTE W/DOPPLER COMPLETE: CPT

## 2024-10-19 PROCEDURE — 99223 1ST HOSP IP/OBS HIGH 75: CPT | Performed by: INTERNAL MEDICINE

## 2024-10-19 PROCEDURE — 94799 UNLISTED PULMONARY SVC/PX: CPT

## 2024-10-19 PROCEDURE — 71045 X-RAY EXAM CHEST 1 VIEW: CPT

## 2024-10-19 PROCEDURE — 82948 REAGENT STRIP/BLOOD GLUCOSE: CPT

## 2024-10-19 PROCEDURE — 80048 BASIC METABOLIC PNL TOTAL CA: CPT | Performed by: INTERNAL MEDICINE

## 2024-10-19 PROCEDURE — 25010000002 ENOXAPARIN PER 10 MG: Performed by: INTERNAL MEDICINE

## 2024-10-19 RX ORDER — ACETAMINOPHEN 325 MG/1
650 TABLET ORAL EVERY 4 HOURS PRN
Status: DISCONTINUED | OUTPATIENT
Start: 2024-10-19 | End: 2024-10-25 | Stop reason: HOSPADM

## 2024-10-19 RX ORDER — METOPROLOL TARTRATE 25 MG/1
25 TABLET, FILM COATED ORAL EVERY 12 HOURS SCHEDULED
Status: DISCONTINUED | OUTPATIENT
Start: 2024-10-19 | End: 2024-10-20

## 2024-10-19 RX ORDER — FAMOTIDINE 20 MG/1
20 TABLET, FILM COATED ORAL DAILY
Status: DISCONTINUED | OUTPATIENT
Start: 2024-10-19 | End: 2024-10-20

## 2024-10-19 RX ORDER — POTASSIUM CHLORIDE 750 MG/1
40 TABLET, FILM COATED, EXTENDED RELEASE ORAL ONCE
Status: COMPLETED | OUTPATIENT
Start: 2024-10-19 | End: 2024-10-19

## 2024-10-19 RX ORDER — FAMOTIDINE 20 MG/1
20 TABLET, FILM COATED ORAL
Status: DISCONTINUED | OUTPATIENT
Start: 2024-10-19 | End: 2024-10-19 | Stop reason: DRUGHIGH

## 2024-10-19 RX ADMIN — MEMANTINE HYDROCHLORIDE 10 MG: 10 TABLET, FILM COATED ORAL at 09:17

## 2024-10-19 RX ADMIN — DONEPEZIL HYDROCHLORIDE 10 MG: 10 TABLET, FILM COATED ORAL at 20:07

## 2024-10-19 RX ADMIN — SACUBITRIL AND VALSARTAN 1 TABLET: 24; 26 TABLET, FILM COATED ORAL at 17:17

## 2024-10-19 RX ADMIN — ENOXAPARIN SODIUM 60 MG: 100 INJECTION SUBCUTANEOUS at 06:15

## 2024-10-19 RX ADMIN — INSULIN HUMAN 2 UNITS: 100 INJECTION, SOLUTION PARENTERAL at 06:15

## 2024-10-19 RX ADMIN — SODIUM CHLORIDE TAB 1 GM 1 G: 1 TAB at 20:07

## 2024-10-19 RX ADMIN — FUROSEMIDE 80 MG: 10 INJECTION, SOLUTION INTRAMUSCULAR; INTRAVENOUS at 09:17

## 2024-10-19 RX ADMIN — ENOXAPARIN SODIUM 60 MG: 100 INJECTION SUBCUTANEOUS at 20:08

## 2024-10-19 RX ADMIN — FAMOTIDINE 20 MG: 20 TABLET, FILM COATED ORAL at 17:17

## 2024-10-19 RX ADMIN — INSULIN HUMAN 5 UNITS: 100 INJECTION, SOLUTION PARENTERAL at 00:23

## 2024-10-19 RX ADMIN — IPRATROPIUM BROMIDE AND ALBUTEROL SULFATE 3 ML: 2.5; .5 SOLUTION RESPIRATORY (INHALATION) at 13:31

## 2024-10-19 RX ADMIN — FUROSEMIDE 80 MG: 10 INJECTION, SOLUTION INTRAMUSCULAR; INTRAVENOUS at 00:23

## 2024-10-19 RX ADMIN — ACETAMINOPHEN 500 MG: 500 TABLET ORAL at 00:23

## 2024-10-19 RX ADMIN — ACETAMINOPHEN 325MG 650 MG: 325 TABLET ORAL at 18:08

## 2024-10-19 RX ADMIN — POTASSIUM CHLORIDE 40 MEQ: 750 TABLET, EXTENDED RELEASE ORAL at 14:56

## 2024-10-19 RX ADMIN — SODIUM CHLORIDE TAB 1 GM 1 G: 1 TAB at 09:17

## 2024-10-19 RX ADMIN — IPRATROPIUM BROMIDE AND ALBUTEROL SULFATE 3 ML: 2.5; .5 SOLUTION RESPIRATORY (INHALATION) at 00:43

## 2024-10-19 RX ADMIN — CEFTRIAXONE SODIUM 1000 MG: 1 INJECTION, POWDER, FOR SOLUTION INTRAMUSCULAR; INTRAVENOUS at 23:58

## 2024-10-19 RX ADMIN — ACETAMINOPHEN 500 MG: 500 TABLET ORAL at 09:17

## 2024-10-19 RX ADMIN — ATORVASTATIN CALCIUM 10 MG: 20 TABLET, FILM COATED ORAL at 09:17

## 2024-10-19 RX ADMIN — IPRATROPIUM BROMIDE AND ALBUTEROL SULFATE 3 ML: 2.5; .5 SOLUTION RESPIRATORY (INHALATION) at 19:36

## 2024-10-19 RX ADMIN — METOPROLOL TARTRATE 25 MG: 25 TABLET, FILM COATED ORAL at 20:07

## 2024-10-19 RX ADMIN — IPRATROPIUM BROMIDE AND ALBUTEROL SULFATE 3 ML: 2.5; .5 SOLUTION RESPIRATORY (INHALATION) at 07:10

## 2024-10-19 RX ADMIN — METOPROLOL TARTRATE 25 MG: 25 TABLET, FILM COATED ORAL at 09:17

## 2024-10-19 RX ADMIN — ASPIRIN 324 MG: 81 TABLET, CHEWABLE ORAL at 09:17

## 2024-10-19 RX ADMIN — PERFLUTREN 2 ML: 6.52 INJECTION, SUSPENSION INTRAVENOUS at 10:29

## 2024-10-19 RX ADMIN — MEMANTINE HYDROCHLORIDE 10 MG: 10 TABLET, FILM COATED ORAL at 20:07

## 2024-10-19 RX ADMIN — CEFTRIAXONE SODIUM 1000 MG: 1 INJECTION, POWDER, FOR SOLUTION INTRAMUSCULAR; INTRAVENOUS at 00:23

## 2024-10-19 RX ADMIN — INSULIN HUMAN 4 UNITS: 100 INJECTION, SOLUTION PARENTERAL at 17:17

## 2024-10-19 NOTE — H&P
HISTORY AND PHYSICAL   [facility], [facility]      10/19/2024    Patient Identification:    Name: Kecia Martinez  Age: 84 y.o.  Sex: female  :  1940  MRN: [id number]                       Primary Care Physician: Ministerio Armas MD    Chief Complaint:      Chief Complaint   Patient presents with    Shortness of Breath         History of Present Illness:     Patient is an 84-year-old female, resident of the nursing home.  Patient has history of Alzheimer dementia, chronic kidney disease, hypertension, hyperlipidemia, coronary artery disease with history MI, peripheral vascular disease, diabetes mellitus type 2.  The day of admission,, patient developed acute onset of shortness of breath, requiring 3 L of oxygen to obtain saturation of 90%.  Symptoms persisted and patient developed with chest congestion and wheezing so patient was sent to the emergency room.  In the ER, patient was found to have: WBC 17.06, hemoglobin 12.1, creatinine 1.02, sodium 136, potassium 5.0, BNP 14,133, troponin 1522.  Chest x-ray show vascular congestion with vague opacity in both lungs bases greater on the right than the left.  In the ER, patient was given DuoNeb, steroid, IV Lasix, aspirin, Lovenox.  Cardiology was consulted.  Patient was admitted for further management.  After receiving diuresis, patient improved.  This morning, patient is calm, is on 2 L nasal cannula and saturations %.  She has no chest pain or shortness of breath this morning.    Past Medical History:  Past Medical History:   Diagnosis Date    Alzheimer disease     Myocardial infarct, old     Type 2 diabetes mellitus      Past Surgical History:  History reviewed. No pertinent surgical history.   Home Meds:  Medications Prior to Admission   Medication Sig Dispense Refill Last Dose/Taking    acetaminophen (TYLENOL) 500 MG tablet Take 1 tablet by mouth 2 (Two) Times a Day.   Taking    amLODIPine (NORVASC) 10 MG tablet Take 1 tablet by mouth Daily.    Taking    atorvastatin (LIPITOR) 20 MG tablet Take 0.5 tablets by mouth Daily.   Taking    donepezil (ARICEPT) 10 MG tablet Take 1 tablet by mouth Every Night.   Taking    Dulaglutide (Trulicity) 3 MG/0.5ML solution pen-injector Inject 0.5 mL under the skin into the appropriate area as directed 1 (One) Time Per Week. Thurs   Taking    furosemide (LASIX) 10 MG/ML injection Inject 4 mL into the appropriate muscle as directed by prescriber 1 (One) Time.   Taking    Glucagon 3 MG/DOSE powder Administer 1 spray into the nostril(s) as directed by provider As Needed (hypoglycemia).   Taking As Needed    hydrALAZINE (APRESOLINE) 50 MG tablet Take 1 tablet by mouth 3 (Three) Times a Day.   Taking    Insulin Glargine (BASAGLAR KWIKPEN) 100 UNIT/ML injection pen Inject 14 Units under the skin into the appropriate area as directed Daily.   Taking    ipratropium-albuterol (DUO-NEB) 0.5-2.5 mg/3 ml nebulizer Take 3 mL by nebulization 2 (Two) Times a Day.   Taking    losartan (COZAAR) 100 MG tablet Take 1 tablet by mouth Daily.   Taking    meclizine (ANTIVERT) 12.5 MG tablet Take 1 tablet by mouth 2 (Two) Times a Day.   Taking    memantine (NAMENDA) 10 MG tablet Take 1 tablet by mouth 2 (Two) Times a Day.   Taking    metFORMIN (GLUCOPHAGE) 500 MG tablet Take 2 tablets by mouth 2 (Two) Times a Day With Meals.   Taking    metoprolol succinate XL (TOPROL-XL) 100 MG 24 hr tablet Take 1 tablet by mouth Daily.   Taking    polyethylene glycol (MIRALAX) 17 g packet Take 17 g by mouth 2 (Two) Times a Day.   Taking    sennosides-docusate (senna-docusate sodium) 8.6-50 MG per tablet Take 2 tablets by mouth Daily.   Taking    sodium chloride 1 g tablet Take 1 tablet by mouth 2 (Two) Times a Day.   Taking    glucagon, human recombinant, (GLUCAGEN DIAGNOSTIC) 1 MG injection Inject 1 mg under the skin into the appropriate area as directed 1 (One) Time.       loperamide (IMODIUM) 2 MG capsule Take 1 capsule by mouth 4 (Four) Times a Day As Needed  "for Diarrhea.       magnesium hydroxide (MILK OF MAGNESIA) 400 MG/5ML suspension Take 30 mL by mouth Daily As Needed for Constipation.          Allergies:  Allergies   Allergen Reactions    Penicillins Unknown - High Severity    Sulfa Antibiotics Unknown - High Severity    Sulfanilamide Unknown - High Severity     Immunizations:  Immunization History   Administered Date(s) Administered    COVID-19 (MODERNA) 1st,2nd,3rd Dose Monovalent 2021, 2021    COVID-19 (PFIZER) BIVALENT 12+YRS 10/18/2022    COVID-19 (PFIZER) Purple Cap Monovalent 2022     Social History:   Social History     Social History Narrative    Not on file     Social History     Tobacco Use    Smoking status: Unknown     Passive exposure: Past    Smokeless tobacco: Not on file   Substance Use Topics    Alcohol use: Defer     Family History:  History reviewed. No pertinent family history.     Review of Systems  See history of present illness and past medical history.        Objective:    Exam:    T MAX 24 hrs: Temp (24hrs), Av.9 °F (37.2 °C), Min:98.2 °F (36.8 °C), Max:99.5 °F (37.5 °C)    Vitals Ranges:   Temp:  [98.2 °F (36.8 °C)-99.5 °F (37.5 °C)] 99.5 °F (37.5 °C)  Heart Rate:  [80-97] 90  Resp:  [16-18] 18  BP: (119-136)/(54-79) 134/57    /57 (BP Location: Right arm, Patient Position: Lying)   Pulse 90   Temp 99.5 °F (37.5 °C) (Oral)   Resp 18   Ht 160 cm (63\")   Wt 56 kg (123 lb 7.3 oz)   SpO2 95%   BMI 21.87 kg/m²     General: Alert, oriented x 1. Cooperative, no distress this morning, appears stated age  HEENT:    Head: Normocephalic, without obvious abnormality, atraumatic  Eyes: EOM are normal. Pupils are equal  Oropharynx: Mucosa and tongue normal  Neck: Supple, symmetrical, trachea midline, no adenopathy;              thyroid:  no enlargement/tenderness/nodules;              no carotid bruit or JVD  Cardiovascular: Tachycardic, regular rhythm and intact distal pulses.              Exam reveals no gallop and " no friction rub. No murmur heard  Chest wall: No tenderness or deformity  Pulmonary: Decreased breath sounds bilaterally.  Few rhonchi at bases.  No wheezing, no rales.    Abdominal: Soft, nontender, bowel sounds active all four quadrants,     no masses, no hepatomegaly, no splenomegaly.   Extremities: Normal, atraumatic, no cyanosis or edema  Pulses: 2 + symmetric all extremities  Neurological: Patient is alert and oriented to person.  No new focal sensorimotor deficit.  Skin: Skin color, texture, normal. Turgor is decreased. No rashes or lesions      Data Review:    Results from last 7 days   Lab Units 10/19/24  0548 10/18/24  1711 10/18/24  1209   WBC 10*3/mm3 11.34* 14.31* 17.06*   HEMOGLOBIN g/dL 9.8* 12.2 12.1   HEMATOCRIT % 28.8* 37.0 36.8   PLATELETS 10*3/mm3 160 193 190       Results from last 7 days   Lab Units 10/19/24  0548 10/18/24  1711   SODIUM mmol/L 138 136   POTASSIUM mmol/L 4.2 5.0   CHLORIDE mmol/L 101 101   CO2 mmol/L 25.4 19.4*   BUN mg/dL 25* 24*   CREATININE mg/dL 1.11* 1.02*   CALCIUM mg/dL 8.7 9.7   BILIRUBIN mg/dL  --  0.4   ALK PHOS U/L  --  85   ALT (SGPT) U/L  --  12   AST (SGOT) U/L  --  91*   GLUCOSE mg/dL 187* 359*                 Lab Results   Lab Value Date/Time    TROPONINT 3,974 (C) 10/19/2024 0548    TROPONINT 2,113 (C) 10/18/2024 2131    TROPONINT 1,522 (C) 10/18/2024 1711    TROPONINT 25 (H) 11/02/2023 1210    TROPONINT 24 (H) 11/02/2023 1011       Brief Urine Lab Results       None             Imaging Results (All)       Procedure Component Value Units Date/Time    XR Chest 1 View [[id number]] Collected: 10/18/24 1746     Updated: 10/18/24 1750    Narrative:      XR CHEST 1 VW-     Clinical: Shortness of breath     FINDINGS: Cardiac size within normal limits, there is atherosclerotic  calcification of the aorta. There is vascular congestion with vague  opacity at both lung bases, greater on the right than the left, likely  associated pleural effusion effusions. There is  bibasilar atelectasis  and/or infiltrate, pneumonia not excluded. Remainder is unremarkable.     This report was finalized on 10/18/2024 5:47 PM by Dr. Adiel Mason M.D on Workstation: [id number]               Assessment:      Acute hypoxemic respiratory failure  Non-ST elevation MI  Pulmonary edema  Leukocytosis, rule out infection, cannot rule out pneumonia.  Hypertension  Hyperlipidemia  Coronary artery disease, history MI  Peripheral vascular disease  Diabetes mellitus  Alzheimer's dementia       Plan:    Inpatient admission  Patient appears to have non-ST elevation MI.  Will continue aspirin, Lovenox, metoprolol, statins.  2D echo done, reading pending. Cardiology consult in progress.  Cannot rule out pneumonia especially in the right lower lobe, patient has leukocytosis as well as probably consolidation in the right lower lobe, will start Rocephin, will evaluate closely.  Will recheck chest x-ray, will discontinue antibiotics if consolidation clears up quickly.  Monitor volume status, appears to be euvolemic.  Will add Lasix as needed on a daily basis.  Monitor and correct electrolytes, stable at this time.  Accu-Chek and SSI, blood sugar in the mid 100s.  Will continue to adjust accordingly.  Monitor mental status, she was a little confused on admission, however, this morning she is close to her baseline.  DVT prophylaxis: Patient is on Lovenox  Stress ulcer prophylaxis: Will add Pepcid.  Labs in am        Ministerio Armas MD  10/19/2024  09:01 EDT

## 2024-10-19 NOTE — CONSULTS
Date of Hospital Visit: 10/18/2024  Date of consult: 10/19/2024  Encounter Provider: Cornel Canela MD  Place of Service: [facility] CARDIOLOGY  Patient Name: Kecia Martinez  :1940  Referral Provider: Ministerio Armas MD    Chief complaint: Shortness of breath    Reason for consult: CHF/elevated troponin    History of Present Illness  Ms. Martinez is an an 84 years old lady who is currently resident of nursing home because of underlying dementia brought to the ER with concerns of low O2 sats and shortness of breath at nursing home.  ER workup elevated significantly elevated serial troponin elevation, pulmonary vascular congestion and significantly elevated proBNP.She was given Lasix 80 mg IV x 1, dose of therapeutic Lovenox, aspirin and admitted for further evaluation and treatment.  She is getting echocardiogram this morning.  Home cardiac related medications include amlodipine, atorvastatin, hydralazine, losartan, metoprolol succinate 100 mg p.o. daily.  Unknown prior heart history.      Past Medical History:   Diagnosis Date    Alzheimer disease     Myocardial infarct, old     Type 2 diabetes mellitus        History reviewed. No pertinent surgical history.    Medications Prior to Admission   Medication Sig Dispense Refill Last Dose/Taking    acetaminophen (TYLENOL) 500 MG tablet Take 1 tablet by mouth 2 (Two) Times a Day.   Taking    amLODIPine (NORVASC) 10 MG tablet Take 1 tablet by mouth Daily.   Taking    atorvastatin (LIPITOR) 20 MG tablet Take 0.5 tablets by mouth Daily.   Taking    donepezil (ARICEPT) 10 MG tablet Take 1 tablet by mouth Every Night.   Taking    Dulaglutide (Trulicity) 3 MG/0.5ML solution pen-injector Inject 0.5 mL under the skin into the appropriate area as directed 1 (One) Time Per Week. Thurs   Taking    furosemide (LASIX) 10 MG/ML injection Inject 4 mL into the appropriate muscle as directed by prescriber 1 (One) Time.   Taking    Glucagon 3 MG/DOSE  powder Administer 1 spray into the nostril(s) as directed by provider As Needed (hypoglycemia).   Taking As Needed    hydrALAZINE (APRESOLINE) 50 MG tablet Take 1 tablet by mouth 3 (Three) Times a Day.   Taking    Insulin Glargine (BASAGLAR KWIKPEN) 100 UNIT/ML injection pen Inject 14 Units under the skin into the appropriate area as directed Daily.   Taking    ipratropium-albuterol (DUO-NEB) 0.5-2.5 mg/3 ml nebulizer Take 3 mL by nebulization 2 (Two) Times a Day.   Taking    losartan (COZAAR) 100 MG tablet Take 1 tablet by mouth Daily.   Taking    meclizine (ANTIVERT) 12.5 MG tablet Take 1 tablet by mouth 2 (Two) Times a Day.   Taking    memantine (NAMENDA) 10 MG tablet Take 1 tablet by mouth 2 (Two) Times a Day.   Taking    metFORMIN (GLUCOPHAGE) 500 MG tablet Take 2 tablets by mouth 2 (Two) Times a Day With Meals.   Taking    metoprolol succinate XL (TOPROL-XL) 100 MG 24 hr tablet Take 1 tablet by mouth Daily.   Taking    polyethylene glycol (MIRALAX) 17 g packet Take 17 g by mouth 2 (Two) Times a Day.   Taking    sennosides-docusate (senna-docusate sodium) 8.6-50 MG per tablet Take 2 tablets by mouth Daily.   Taking    sodium chloride 1 g tablet Take 1 tablet by mouth 2 (Two) Times a Day.   Taking    glucagon, human recombinant, (GLUCAGEN DIAGNOSTIC) 1 MG injection Inject 1 mg under the skin into the appropriate area as directed 1 (One) Time.       loperamide (IMODIUM) 2 MG capsule Take 1 capsule by mouth 4 (Four) Times a Day As Needed for Diarrhea.       magnesium hydroxide (MILK OF MAGNESIA) 400 MG/5ML suspension Take 30 mL by mouth Daily As Needed for Constipation.          Current Meds  Scheduled Meds:acetaminophen, 500 mg, Oral, BID  aspirin, 324 mg, Oral, Daily  atorvastatin, 10 mg, Oral, Daily  cefTRIAXone, 1,000 mg, Intravenous, Q24H  donepezil, 10 mg, Oral, Nightly  enoxaparin, 1 mg/kg, Subcutaneous, Q12H  furosemide, 80 mg, Intravenous, TID  insulin regular, 2-7 Units, Subcutaneous,  "Q6H  ipratropium-albuterol, 3 mL, Nebulization, Q6H - RT  memantine, 10 mg, Oral, BID  metoprolol tartrate, 25 mg, Oral, Q12H  sodium chloride, 1 g, Oral, BID      Continuous Infusions:   PRN Meds:.  dextrose    dextrose    glucagon (human recombinant)    sodium chloride    [COMPLETED] Insert Peripheral IV **AND** sodium chloride    Allergies as of 10/18/2024 - Reviewed 10/18/2024   Allergen Reaction Noted    Penicillins Unknown - High Severity 11/02/2023    Sulfa antibiotics Unknown - High Severity 11/02/2023    Sulfanilamide Unknown - High Severity 11/02/2023       Social History     Socioeconomic History    Marital status:    Tobacco Use    Smoking status: Unknown     Passive exposure: Past   Vaping Use    Vaping status: Unknown   Substance and Sexual Activity    Alcohol use: Defer    Drug use: Defer    Sexual activity: Defer       History reviewed. No pertinent family history.    REVIEW OF SYSTEMS:   All systems reviewed and pertinent positives include in HPI otherwise negative review of systems.       Objective:   Temp:  [98.2 °F (36.8 °C)-99.5 °F (37.5 °C)] 99.5 °F (37.5 °C)  Heart Rate:  [80-97] 90  Resp:  [16-18] 18  BP: (119-136)/(54-79) 134/57  Body mass index is 21.87 kg/m².  Flowsheet Rows      Flowsheet Row First Filed Value   Admission Height 158.5 cm (62.4\") Documented at 10/18/2024 1657   Admission Weight 56 kg (123 lb 7.3 oz) Documented at 10/18/2024 1657          Vitals:    10/19/24 0738   BP: 134/57   Pulse: 90   Resp: 18   Temp: 99.5 °F (37.5 °C)   SpO2: 95%       General Appearance:    Alert, cooperative, in no acute distress   Head:    Normocephalic, without obvious abnormality, atraumatic   Eyes:            Lids and lashes normal, conjunctivae and sclerae normal, no   icterus, no pallor, corneas clear, PERRLA   Ears:    Ears appear intact with no abnormalities noted   Throat:   No oral lesions, no thrush, oral mucosa moist   Neck:   No adenopathy, supple, trachea midline, no " thyromegaly, no   carotid bruit, no JVD   Back:     No kyphosis present, no scoliosis present, no skin lesions, erythema or scars, no tenderness to percussion or palpation, range of motion normal   Lungs:     Clear to auscultation,respirations regular, even and unlabored    Heart:    Regular rhythm and normal rate, normal S1 and S2, no murmur, no gallop, no rub, no click   Chest Wall:    No abnormalities observed   Abdomen:     Normal bowel sounds, no masses, no organomegaly, soft nontender, nondistended, no guarding, no rebound  tenderness   Extremities:   Moves all extremities well, no edema, no cyanosis, no redness   Pulses:   Pulses palpable and equal bilaterally. Normal radial, carotid, femoral, dorsalis pedis and posterior tibial pulses bilaterally. Normal abdominal aorta   Skin:  Neurology:   Psychiatric:   No bleeding, bruising or rash   Normal speech and cranial nerve exam, no focal deficit   Alert and oriented x 3, normal mood and affect             Review of Data:      Results from last 7 days   Lab Units 10/19/24  0548 10/18/24  1711   SODIUM mmol/L 138 136   POTASSIUM mmol/L 4.2 5.0   CHLORIDE mmol/L 101 101   CO2 mmol/L 25.4 19.4*   BUN mg/dL 25* 24*   CREATININE mg/dL 1.11* 1.02*   CALCIUM mg/dL 8.7 9.7   BILIRUBIN mg/dL  --  0.4   ALK PHOS U/L  --  85   ALT (SGPT) U/L  --  12   AST (SGOT) U/L  --  91*   GLUCOSE mg/dL 187* 359*     Results from last 7 days   Lab Units 10/19/24  0548 10/18/24  2131 10/18/24  1711   HSTROP T ng/L 3,974* 2,113* 1,522*     @LABRCNTbnp@  Results from last 7 days   Lab Units 10/19/24  0548 10/18/24  1711 10/18/24  1209   WBC 10*3/mm3 11.34* 14.31* 17.06*   HEMOGLOBIN g/dL 9.8* 12.2 12.1   HEMATOCRIT % 28.8* 37.0 36.8   PLATELETS 10*3/mm3 160 193 190             @LABRCNTIP(chol,trig,hdl,ldl)    I personally viewed and interpreted the patient's EKG/Telemetry data  )   Acute hypoxemic respiratory failure        Assessment and Plan:    Acute hypoxemic respiratory failure from  cardiogenic pulmonary edema  NSTEMI-elevated serial troponin: 1522, 2113, 3974.  Echocardiogram with preserved left ventricular ejection fraction but hypokinesis of basal inferior and inferolateral walls.  Essential hypertension  Hypercholesterolemia  Dementia  Type 2 diabetes mellitus    Improved breathing after Lasix 80 mg IV x 1-with excellent diuresis  She denies having any chest pain at time of exam and reports improved breathing.  No acute events on telemetry overnight  Euvolemic on exam this morning.  IVC nondilated.  Hold IV diuretic today  I will start her on Entresto tonight  No plan for invasive procedure today.DC NPO    Cornel Canela MD  10/19/24  10:15 EDT.      Time spent in reviewing chart, discussion and examination:

## 2024-10-19 NOTE — PLAN OF CARE
Goal Outcome Evaluation:   Patient alert forgetful follows commands admitted this shift. No c/o pain or nausea noted. Pure wick changed and replaced. Incont care and skin care provided. Iv lasix given. Po antibx given. No acute distress noted will continue to monitor

## 2024-10-20 ENCOUNTER — APPOINTMENT (OUTPATIENT)
Dept: CT IMAGING | Facility: HOSPITAL | Age: 84
End: 2024-10-20
Payer: MEDICARE

## 2024-10-20 LAB
ANION GAP SERPL CALCULATED.3IONS-SCNC: 11 MMOL/L (ref 5–15)
BASOPHILS # BLD AUTO: 0.04 10*3/MM3 (ref 0–0.2)
BASOPHILS NFR BLD AUTO: 0.3 % (ref 0–1.5)
BUN SERPL-MCNC: 25 MG/DL (ref 8–23)
BUN/CREAT SERPL: 23.4 (ref 7–25)
CALCIUM SPEC-SCNC: 8.6 MG/DL (ref 8.6–10.5)
CHLORIDE SERPL-SCNC: 102 MMOL/L (ref 98–107)
CO2 SERPL-SCNC: 27 MMOL/L (ref 22–29)
CREAT SERPL-MCNC: 1.07 MG/DL (ref 0.57–1)
DEPRECATED RDW RBC AUTO: 43.3 FL (ref 37–54)
EGFRCR SERPLBLD CKD-EPI 2021: 52 ML/MIN/1.73
EOSINOPHIL # BLD AUTO: 0.03 10*3/MM3 (ref 0–0.4)
EOSINOPHIL NFR BLD AUTO: 0.2 % (ref 0.3–6.2)
ERYTHROCYTE [DISTWIDTH] IN BLOOD BY AUTOMATED COUNT: 13.1 % (ref 12.3–15.4)
GLUCOSE BLDC GLUCOMTR-MCNC: 121 MG/DL (ref 70–130)
GLUCOSE BLDC GLUCOMTR-MCNC: 135 MG/DL (ref 70–130)
GLUCOSE BLDC GLUCOMTR-MCNC: 242 MG/DL (ref 70–130)
GLUCOSE BLDC GLUCOMTR-MCNC: 350 MG/DL (ref 70–130)
GLUCOSE SERPL-MCNC: 109 MG/DL (ref 65–99)
HCT VFR BLD AUTO: 31.7 % (ref 34–46.6)
HGB BLD-MCNC: 10.4 G/DL (ref 12–15.9)
IMM GRANULOCYTES # BLD AUTO: 0.07 10*3/MM3 (ref 0–0.05)
IMM GRANULOCYTES NFR BLD AUTO: 0.5 % (ref 0–0.5)
LYMPHOCYTES # BLD AUTO: 2 10*3/MM3 (ref 0.7–3.1)
LYMPHOCYTES NFR BLD AUTO: 13.3 % (ref 19.6–45.3)
MCH RBC QN AUTO: 29.9 PG (ref 26.6–33)
MCHC RBC AUTO-ENTMCNC: 32.8 G/DL (ref 31.5–35.7)
MCV RBC AUTO: 91.1 FL (ref 79–97)
MONOCYTES # BLD AUTO: 0.97 10*3/MM3 (ref 0.1–0.9)
MONOCYTES NFR BLD AUTO: 6.4 % (ref 5–12)
NEUTROPHILS NFR BLD AUTO: 11.98 10*3/MM3 (ref 1.7–7)
NEUTROPHILS NFR BLD AUTO: 79.3 % (ref 42.7–76)
NRBC BLD AUTO-RTO: 0 /100 WBC (ref 0–0.2)
PLATELET # BLD AUTO: 170 10*3/MM3 (ref 140–450)
PMV BLD AUTO: 12.2 FL (ref 6–12)
POTASSIUM SERPL-SCNC: 3.6 MMOL/L (ref 3.5–5.2)
QT INTERVAL: 391 MS
QTC INTERVAL: 495 MS
RBC # BLD AUTO: 3.48 10*6/MM3 (ref 3.77–5.28)
SODIUM SERPL-SCNC: 140 MMOL/L (ref 136–145)
WBC NRBC COR # BLD AUTO: 15.09 10*3/MM3 (ref 3.4–10.8)

## 2024-10-20 PROCEDURE — 94761 N-INVAS EAR/PLS OXIMETRY MLT: CPT

## 2024-10-20 PROCEDURE — 94799 UNLISTED PULMONARY SVC/PX: CPT

## 2024-10-20 PROCEDURE — 99232 SBSQ HOSP IP/OBS MODERATE 35: CPT | Performed by: INTERNAL MEDICINE

## 2024-10-20 PROCEDURE — 80048 BASIC METABOLIC PNL TOTAL CA: CPT | Performed by: INTERNAL MEDICINE

## 2024-10-20 PROCEDURE — 82948 REAGENT STRIP/BLOOD GLUCOSE: CPT

## 2024-10-20 PROCEDURE — 85025 COMPLETE CBC W/AUTO DIFF WBC: CPT | Performed by: INTERNAL MEDICINE

## 2024-10-20 PROCEDURE — 71250 CT THORAX DX C-: CPT

## 2024-10-20 PROCEDURE — 25010000002 ENOXAPARIN PER 10 MG: Performed by: INTERNAL MEDICINE

## 2024-10-20 PROCEDURE — 94664 DEMO&/EVAL PT USE INHALER: CPT

## 2024-10-20 PROCEDURE — 63710000001 INSULIN REGULAR HUMAN PER 5 UNITS: Performed by: INTERNAL MEDICINE

## 2024-10-20 RX ORDER — ASPIRIN 81 MG/1
81 TABLET, CHEWABLE ORAL DAILY
Status: DISCONTINUED | OUTPATIENT
Start: 2024-10-21 | End: 2024-10-25 | Stop reason: HOSPADM

## 2024-10-20 RX ORDER — METOPROLOL SUCCINATE 50 MG/1
50 TABLET, EXTENDED RELEASE ORAL 2 TIMES DAILY
Status: DISCONTINUED | OUTPATIENT
Start: 2024-10-20 | End: 2024-10-25 | Stop reason: HOSPADM

## 2024-10-20 RX ORDER — CLOPIDOGREL BISULFATE 75 MG/1
75 TABLET ORAL DAILY
Status: DISCONTINUED | OUTPATIENT
Start: 2024-10-20 | End: 2024-10-25 | Stop reason: HOSPADM

## 2024-10-20 RX ORDER — PANTOPRAZOLE SODIUM 40 MG/1
40 TABLET, DELAYED RELEASE ORAL
Status: DISCONTINUED | OUTPATIENT
Start: 2024-10-20 | End: 2024-10-25 | Stop reason: HOSPADM

## 2024-10-20 RX ORDER — FUROSEMIDE 40 MG/1
40 TABLET ORAL DAILY
Status: DISCONTINUED | OUTPATIENT
Start: 2024-10-20 | End: 2024-10-21

## 2024-10-20 RX ORDER — ENOXAPARIN SODIUM 100 MG/ML
40 INJECTION SUBCUTANEOUS EVERY 24 HOURS
Status: DISCONTINUED | OUTPATIENT
Start: 2024-10-20 | End: 2024-10-25 | Stop reason: HOSPADM

## 2024-10-20 RX ADMIN — SACUBITRIL AND VALSARTAN 1 TABLET: 24; 26 TABLET, FILM COATED ORAL at 22:01

## 2024-10-20 RX ADMIN — ACETAMINOPHEN 325MG 650 MG: 325 TABLET ORAL at 13:36

## 2024-10-20 RX ADMIN — ACETAMINOPHEN 325MG 650 MG: 325 TABLET ORAL at 05:10

## 2024-10-20 RX ADMIN — FAMOTIDINE 20 MG: 20 TABLET, FILM COATED ORAL at 08:15

## 2024-10-20 RX ADMIN — IPRATROPIUM BROMIDE AND ALBUTEROL SULFATE 3 ML: 2.5; .5 SOLUTION RESPIRATORY (INHALATION) at 06:57

## 2024-10-20 RX ADMIN — IPRATROPIUM BROMIDE AND ALBUTEROL SULFATE 3 ML: 2.5; .5 SOLUTION RESPIRATORY (INHALATION) at 01:35

## 2024-10-20 RX ADMIN — FUROSEMIDE 40 MG: 40 TABLET ORAL at 13:29

## 2024-10-20 RX ADMIN — ATORVASTATIN CALCIUM 10 MG: 20 TABLET, FILM COATED ORAL at 08:15

## 2024-10-20 RX ADMIN — MEMANTINE HYDROCHLORIDE 10 MG: 10 TABLET, FILM COATED ORAL at 08:15

## 2024-10-20 RX ADMIN — ENOXAPARIN SODIUM 60 MG: 100 INJECTION SUBCUTANEOUS at 08:15

## 2024-10-20 RX ADMIN — ASPIRIN 324 MG: 81 TABLET, CHEWABLE ORAL at 08:15

## 2024-10-20 RX ADMIN — MEMANTINE HYDROCHLORIDE 10 MG: 10 TABLET, FILM COATED ORAL at 22:01

## 2024-10-20 RX ADMIN — IPRATROPIUM BROMIDE AND ALBUTEROL SULFATE 3 ML: 2.5; .5 SOLUTION RESPIRATORY (INHALATION) at 21:47

## 2024-10-20 RX ADMIN — INSULIN HUMAN 3 UNITS: 100 INJECTION, SOLUTION PARENTERAL at 16:50

## 2024-10-20 RX ADMIN — SACUBITRIL AND VALSARTAN 1 TABLET: 24; 26 TABLET, FILM COATED ORAL at 08:14

## 2024-10-20 RX ADMIN — PANTOPRAZOLE SODIUM 40 MG: 40 TABLET, DELAYED RELEASE ORAL at 13:29

## 2024-10-20 RX ADMIN — IPRATROPIUM BROMIDE AND ALBUTEROL SULFATE 3 ML: 2.5; .5 SOLUTION RESPIRATORY (INHALATION) at 12:00

## 2024-10-20 RX ADMIN — CLOPIDOGREL BISULFATE 75 MG: 75 TABLET, FILM COATED ORAL at 13:29

## 2024-10-20 RX ADMIN — SODIUM CHLORIDE TAB 1 GM 1 G: 1 TAB at 22:01

## 2024-10-20 RX ADMIN — SODIUM CHLORIDE TAB 1 GM 1 G: 1 TAB at 08:15

## 2024-10-20 RX ADMIN — DONEPEZIL HYDROCHLORIDE 10 MG: 10 TABLET, FILM COATED ORAL at 22:01

## 2024-10-20 RX ADMIN — METOPROLOL SUCCINATE 50 MG: 50 TABLET, FILM COATED, EXTENDED RELEASE ORAL at 22:01

## 2024-10-20 RX ADMIN — METOPROLOL TARTRATE 25 MG: 25 TABLET, FILM COATED ORAL at 08:15

## 2024-10-20 RX ADMIN — METOPROLOL SUCCINATE 50 MG: 50 TABLET, FILM COATED, EXTENDED RELEASE ORAL at 13:29

## 2024-10-20 NOTE — PLAN OF CARE
Goal Outcome Evaluation:   Patient alert follows commands self turn. Prn pain med given. No nausea noted. Pure wick changed this shift. Incont care and skin care provided. No acute distress noted. Will continue to monitor

## 2024-10-20 NOTE — PROGRESS NOTES
DAILY PROGRESS NOTE  [facility], [facility]    10/20/2024    Patient Identification:  Name: Kecia Martinez  Age: 84 y.o.  Sex: female  :  1940  MRN: [id number]           Primary Care Physician: Ministerio Armas MD    Subjective:    Interval History:    Patient is feeling better this morning.  On room air now.  Respiratory status much better.  2D echo results reviewed: EF 52.1%.  Appreciate consultants recommendation  Vital signs stable, saturation in room air is 95%.  Afebrile.  Blood sugar in the low 100s, creatinine 1.07, WBC 15.09.    ROS:     No nausea, vomiting, diarrhea, constipation today.  No chest pain.  No shortness of breath.    Objective:    Scheduled Meds:  [START ON 10/21/2024] aspirin, 81 mg, Oral, Daily  atorvastatin, 10 mg, Oral, Daily  cefTRIAXone, 1,000 mg, Intravenous, Q24H  clopidogrel, 75 mg, Oral, Daily  donepezil, 10 mg, Oral, Nightly  furosemide, 40 mg, Oral, Daily  insulin regular, 2-7 Units, Subcutaneous, Q6H  ipratropium-albuterol, 3 mL, Nebulization, Q6H - RT  memantine, 10 mg, Oral, BID  metoprolol succinate XL, 50 mg, Oral, BID  pantoprazole, 40 mg, Oral, Q AM  sacubitril-valsartan, 1 tablet, Oral, Q12H  sodium chloride, 1 g, Oral, BID        Continuous Infusions:       PRN Meds:    acetaminophen    dextrose    dextrose    glucagon (human recombinant)    sodium chloride    [COMPLETED] Insert Peripheral IV **AND** sodium chloride    Intake/Output:    Intake/Output Summary (Last 24 hours) at 10/20/2024 1205  Last data filed at 10/20/2024 0954  Gross per 24 hour   Intake 100 ml   Output 2750 ml   Net -2650 ml         Exam:    T MAX 24 hrs: Temp (24hrs), Av.5 °F (36.9 °C), Min:98 °F (36.7 °C), Max:99.6 °F (37.6 °C)    Vitals Ranges:   Temp:  [98 °F (36.7 °C)-99.6 °F (37.6 °C)] 98 °F (36.7 °C)  Heart Rate:  [78-96] 86  Resp:  [18] 18  BP: (112-136)/(55-90) 136/90    /90 (BP Location: Right arm, Patient Position: Lying)   Pulse 86    "Temp 98 °F (36.7 °C) (Oral)   Resp 18   Ht 160 cm (63\")   Wt 55.8 kg (123 lb)   SpO2 95%   BMI 21.79 kg/m²     General: Alert, oriented x 1. Cooperative, no distress this morning, appears stated age  Neck: Supple, symmetrical, trachea midline, no adenopathy;              thyroid:  no enlargement/tenderness/nodules;              no carotid bruit or JVD  Cardiovascular: Tachycardic, regular rhythm and intact distal pulses.              Exam reveals no gallop and no friction rub. No murmur heard  Pulmonary: Decreased breath sounds bilaterally.  Few rhonchi at bases.  No wheezing, no rales.    Abdominal: Soft, nontender, bowel sounds active all four quadrants,     no masses, no hepatomegaly, no splenomegaly.   Extremities: Normal, atraumatic, no cyanosis or edema  Pulses: 2 + symmetric all extremities  Neurological: Patient is alert and oriented to person.  No new focal sensorimotor deficit.  Skin: Skin color, texture, normal. Turgor is decreased. No rashes or lesions        Data Review:    Results from last 7 days   Lab Units 10/20/24  0434 10/19/24  0548 10/18/24  1711   WBC 10*3/mm3 15.09* 11.34* 14.31*   HEMOGLOBIN g/dL 10.4* 9.8* 12.2   HEMATOCRIT % 31.7* 28.8* 37.0   PLATELETS 10*3/mm3 170 160 193       Results from last 7 days   Lab Units 10/20/24  0434 10/19/24  0548 10/18/24  1711   SODIUM mmol/L 140 138 136   POTASSIUM mmol/L 3.6 4.2 5.0   CHLORIDE mmol/L 102 101 101   CO2 mmol/L 27.0 25.4 19.4*   BUN mg/dL 25* 25* 24*   CREATININE mg/dL 1.07* 1.11* 1.02*   CALCIUM mg/dL 8.6 8.7 9.7   BILIRUBIN mg/dL  --   --  0.4   ALK PHOS U/L  --   --  85   ALT (SGPT) U/L  --   --  12   AST (SGOT) U/L  --   --  91*   GLUCOSE mg/dL 109* 187* 359*                 Lab Results   Lab Value Date/Time    TROPONINT 3,974 (C) 10/19/2024 0548    TROPONINT 2,113 (C) 10/18/2024 2131    TROPONINT 1,522 (C) 10/18/2024 1711    TROPONINT 25 (H) 11/02/2023 1210    TROPONINT 24 (H) 11/02/2023 1011       Microbiology Results (last 10 " days)       Procedure Component Value - Date/Time    Blood Culture - Blood, Arm, Left [[id number]]  (Normal) Collected: 10/18/24 2131    Lab Status: Preliminary result Specimen: Blood from Arm, Left Updated: 10/19/24 2147     Blood Culture No growth at 24 hours    Blood Culture - Blood, Arm, Right [[id number]]  (Normal) Collected: 10/18/24 2131    Lab Status: Preliminary result Specimen: Blood from Arm, Right Updated: 10/19/24 2147     Blood Culture No growth at 24 hours    Respiratory Panel PCR w/COVID-19(SARS-CoV-2) DAR/OUMAR/ANDREW/PAD/COR/ANUPAM In-House, NP Swab in UTM/VTM, 2 HR TAT - Swab, Nasopharynx [[id number]]  (Normal) Collected: 10/18/24 1711    Lab Status: Final result Specimen: Swab from Nasopharynx Updated: 10/18/24 1819     ADENOVIRUS, PCR Not Detected     Coronavirus 229E Not Detected     Coronavirus HKU1 Not Detected     Coronavirus NL63 Not Detected     Coronavirus OC43 Not Detected     COVID19 Not Detected     Human Metapneumovirus Not Detected     Human Rhinovirus/Enterovirus Not Detected     Influenza A PCR Not Detected     Influenza B PCR Not Detected     Parainfluenza Virus 1 Not Detected     Parainfluenza Virus 2 Not Detected     Parainfluenza Virus 3 Not Detected     Parainfluenza Virus 4 Not Detected     RSV, PCR Not Detected     Bordetella pertussis pcr Not Detected     Bordetella parapertussis PCR Not Detected     Chlamydophila pneumoniae PCR Not Detected     Mycoplasma pneumo by PCR Not Detected    Narrative:      In the setting of a positive respiratory panel with a viral infection PLUS a negative procalcitonin without other underlying concern for bacterial infection, consider observing off antibiotics or discontinuation of antibiotics and continue supportive care. If the respiratory panel is positive for atypical bacterial infection (Bordetella pertussis, Chlamydophila pneumoniae, or Mycoplasma pneumoniae), consider antibiotic de-escalation to target atypical bacterial infection.              Imaging Results (Last 7 Days)       Procedure Component Value Units Date/Time    XR Chest 1 View [[id number]] Collected: 10/19/24 1550     Updated: 10/19/24 1555    Narrative:      XR CHEST 1 VW-     HISTORY: Female who is 84 years-old, short of breath     TECHNIQUE: Frontal views of the chest     COMPARISON: 10/18/2024     FINDINGS: The heart size is normal. Aorta is calcified. Pulmonary  vasculature is slightly congested. Increased opacity at the right mid to  lower lung suggests increased atelectasis/infiltrate/pleural effusion.  Persistent left basilar atelectasis/infiltrate/effusion. No  pneumothorax. No acute osseous process.       Impression:      Persistent right more than left opacities, mildly increased  on the right.     This report was finalized on 10/19/2024 3:51 PM by Dr. Javed Terrell M.D on Workstation: [email or web address]       XR Chest 1 View [[id number]] Collected: 10/18/24 1746     Updated: 10/18/24 1750    Narrative:      XR CHEST 1 VW-     Clinical: Shortness of breath     FINDINGS: Cardiac size within normal limits, there is atherosclerotic  calcification of the aorta. There is vascular congestion with vague  opacity at both lung bases, greater on the right than the left, likely  associated pleural effusion effusions. There is bibasilar atelectasis  and/or infiltrate, pneumonia not excluded. Remainder is unremarkable.     This report was finalized on 10/18/2024 5:47 PM by Dr. Adiel Mason M.D on Workstation: [id number]                 Assessment:        Acute hypoxemic respiratory failure  Non-ST elevation MI  Pulmonary edema  Leukocytosis, rule out infection, cannot rule out pneumonia.  Hypertension  Hyperlipidemia  Coronary artery disease, history MI  Peripheral vascular disease  Diabetes mellitus  Alzheimer's dementia        Plan:      Continue treatment for non-ST elevation MI, on medical therapy at this time.  Appreciate cardiology input.  2D echo with preserved ejection fraction,  but hypokinesis of basal inferior and inferolateral walls.  Continue aspirin, Plavix, statin, metoprolol.  Follow-up x-ray showed persistent right lower lobe opacity, on antibiotics for possible pneumonia.  I agreed to check CT of the chest for further characterize this findings.  Will continue antibiotic in the meantime.  Monitor volume status, appears to be euvolemic.  On p.o. Lasix now.  Monitor and correct electrolytes, stable at this time.  Accu-Chek and SSI, blood sugar in the low  100s.  Will continue to adjust accordingly.  Monitor mental status, back to baseline at this time.    DVT prophylaxis: Lovenox dose for prophylaxis.    Stress ulcer prophylaxis: Pepcid.  Labs in am      Ministerio Armas MD  10/20/2024  12:05 EDT

## 2024-10-20 NOTE — PROGRESS NOTES
"CC: Congestive heart failure/hypoxemia    Interval History: No new acute events overnight      Vital Signs  Temp:  [98 °F (36.7 °C)-99.6 °F (37.6 °C)] 98 °F (36.7 °C)  Heart Rate:  [78-96] 96  Resp:  [18] 18  BP: (112-136)/(55-90) 136/90    Intake/Output Summary (Last 24 hours) at 10/20/2024 1037  Last data filed at 10/20/2024 0954  Gross per 24 hour   Intake 100 ml   Output 2750 ml   Net -2650 ml     Flowsheet Rows      Flowsheet Row First Filed Value   Admission Height 158.5 cm (62.4\") Documented at 10/18/2024 1657   Admission Weight 56 kg (123 lb 7.3 oz) Documented at 10/18/2024 1657            PHYSICAL EXAM:  General: No acute distress  Resp:NL Rate, symmetric chest expansion,unlabored, clear  CV:NL rate and rhythm, NL PMI, NL S1 and S2, no Murmur, no gallop, no rub, No JVD.   ABD:Nl sounds, no masses or tenderness, nondistended, no guarding or rebound  Neuro: alert,cooperative and oriented  Extr:Normal pedal pulses, No edema or cyanosis, moves all extremities      Results Review:    Results from last 7 days   Lab Units 10/20/24  0434   SODIUM mmol/L 140   POTASSIUM mmol/L 3.6   CHLORIDE mmol/L 102   CO2 mmol/L 27.0   BUN mg/dL 25*   CREATININE mg/dL 1.07*   GLUCOSE mg/dL 109*   CALCIUM mg/dL 8.6     Results from last 7 days   Lab Units 10/19/24  0548 10/18/24  2131 10/18/24  1711   HSTROP T ng/L 3,974* 2,113* 1,522*     Results from last 7 days   Lab Units 10/20/24  0434   WBC 10*3/mm3 15.09*   HEMOGLOBIN g/dL 10.4*   HEMATOCRIT % 31.7*   PLATELETS 10*3/mm3 170                     I reviewed the patient's new clinical results.  I personally viewed and interpreted the patient's EKG/Telemetry data        Medication Review:   Meds reviewed         Assessment and Plan:     Acute hypoxemic respiratory failure from cardiogenic pulmonary edema/?  Pneumonia  NSTEMI-elevated serial troponin: 1522, 2113, 3974.  Echocardiogram with preserved left ventricular ejection fraction but hypokinesis of basal inferior and " inferolateral walls.  Medical therapy for now with DAPT, statin and beta-blocker.  Essential hypertension  Hypercholesterolemia  Dementia  Type 2 diabetes mellitus    Excellent diuresis over the last 48 hours.  I have switched her to oral Lasix today.  She has bilateral pleural effusion/?  Consolidation-CT chest ordered for further evaluation.  On antibiotic per Dr. Armas  Noted to have mildly increased white cell count this morning but resolved lactic acidosis  She is off supplemental oxygen.  Vital signs within range.  She feels significantly better.  Cardiology will follow along.  I have discussed patient with her nurse       Cornel Canela MD  10/20/24  10:37 EDT

## 2024-10-21 LAB
ALBUMIN SERPL-MCNC: 3.1 G/DL (ref 3.5–5.2)
ALBUMIN/GLOB SERPL: 1.2 G/DL
ALP SERPL-CCNC: 75 U/L (ref 39–117)
ALT SERPL W P-5'-P-CCNC: 11 U/L (ref 1–33)
ANION GAP SERPL CALCULATED.3IONS-SCNC: 6 MMOL/L (ref 5–15)
AST SERPL-CCNC: 26 U/L (ref 1–32)
BASOPHILS # BLD AUTO: 0.05 10*3/MM3 (ref 0–0.2)
BASOPHILS NFR BLD AUTO: 0.5 % (ref 0–1.5)
BILIRUB SERPL-MCNC: 0.3 MG/DL (ref 0–1.2)
BUN SERPL-MCNC: 16 MG/DL (ref 8–23)
BUN/CREAT SERPL: 20.8 (ref 7–25)
CALCIUM SPEC-SCNC: 8.6 MG/DL (ref 8.6–10.5)
CHLORIDE SERPL-SCNC: 102 MMOL/L (ref 98–107)
CO2 SERPL-SCNC: 30 MMOL/L (ref 22–29)
CREAT SERPL-MCNC: 0.77 MG/DL (ref 0.57–1)
DEPRECATED RDW RBC AUTO: 41.7 FL (ref 37–54)
EGFRCR SERPLBLD CKD-EPI 2021: 77.1 ML/MIN/1.73
EOSINOPHIL # BLD AUTO: 0.13 10*3/MM3 (ref 0–0.4)
EOSINOPHIL NFR BLD AUTO: 1.4 % (ref 0.3–6.2)
ERYTHROCYTE [DISTWIDTH] IN BLOOD BY AUTOMATED COUNT: 12.3 % (ref 12.3–15.4)
GLOBULIN UR ELPH-MCNC: 2.6 GM/DL
GLUCOSE BLDC GLUCOMTR-MCNC: 148 MG/DL (ref 70–130)
GLUCOSE BLDC GLUCOMTR-MCNC: 168 MG/DL (ref 70–130)
GLUCOSE BLDC GLUCOMTR-MCNC: 210 MG/DL (ref 70–130)
GLUCOSE BLDC GLUCOMTR-MCNC: 277 MG/DL (ref 70–130)
GLUCOSE SERPL-MCNC: 175 MG/DL (ref 65–99)
HCT VFR BLD AUTO: 34 % (ref 34–46.6)
HGB BLD-MCNC: 11.1 G/DL (ref 12–15.9)
IMM GRANULOCYTES # BLD AUTO: 0.03 10*3/MM3 (ref 0–0.05)
IMM GRANULOCYTES NFR BLD AUTO: 0.3 % (ref 0–0.5)
LYMPHOCYTES # BLD AUTO: 1.72 10*3/MM3 (ref 0.7–3.1)
LYMPHOCYTES NFR BLD AUTO: 18.6 % (ref 19.6–45.3)
MCH RBC QN AUTO: 30.2 PG (ref 26.6–33)
MCHC RBC AUTO-ENTMCNC: 32.6 G/DL (ref 31.5–35.7)
MCV RBC AUTO: 92.4 FL (ref 79–97)
MONOCYTES # BLD AUTO: 0.82 10*3/MM3 (ref 0.1–0.9)
MONOCYTES NFR BLD AUTO: 8.9 % (ref 5–12)
NEUTROPHILS NFR BLD AUTO: 6.49 10*3/MM3 (ref 1.7–7)
NEUTROPHILS NFR BLD AUTO: 70.3 % (ref 42.7–76)
NRBC BLD AUTO-RTO: 0 /100 WBC (ref 0–0.2)
PLATELET # BLD AUTO: 175 10*3/MM3 (ref 140–450)
PMV BLD AUTO: 11.8 FL (ref 6–12)
POTASSIUM SERPL-SCNC: 3.5 MMOL/L (ref 3.5–5.2)
PROT SERPL-MCNC: 5.7 G/DL (ref 6–8.5)
RBC # BLD AUTO: 3.68 10*6/MM3 (ref 3.77–5.28)
SODIUM SERPL-SCNC: 138 MMOL/L (ref 136–145)
WBC NRBC COR # BLD AUTO: 9.24 10*3/MM3 (ref 3.4–10.8)

## 2024-10-21 PROCEDURE — 25010000002 CEFTRIAXONE PER 250 MG: Performed by: INTERNAL MEDICINE

## 2024-10-21 PROCEDURE — 80053 COMPREHEN METABOLIC PANEL: CPT | Performed by: INTERNAL MEDICINE

## 2024-10-21 PROCEDURE — 85025 COMPLETE CBC W/AUTO DIFF WBC: CPT | Performed by: INTERNAL MEDICINE

## 2024-10-21 PROCEDURE — 63710000001 INSULIN REGULAR HUMAN PER 5 UNITS: Performed by: INTERNAL MEDICINE

## 2024-10-21 PROCEDURE — 94761 N-INVAS EAR/PLS OXIMETRY MLT: CPT

## 2024-10-21 PROCEDURE — 99232 SBSQ HOSP IP/OBS MODERATE 35: CPT | Performed by: NURSE PRACTITIONER

## 2024-10-21 PROCEDURE — 25010000002 ENOXAPARIN PER 10 MG: Performed by: INTERNAL MEDICINE

## 2024-10-21 PROCEDURE — 94664 DEMO&/EVAL PT USE INHALER: CPT

## 2024-10-21 PROCEDURE — 94799 UNLISTED PULMONARY SVC/PX: CPT

## 2024-10-21 PROCEDURE — 87102 FUNGUS ISOLATION CULTURE: CPT | Performed by: INTERNAL MEDICINE

## 2024-10-21 PROCEDURE — 82948 REAGENT STRIP/BLOOD GLUCOSE: CPT

## 2024-10-21 RX ORDER — METFORMIN HYDROCHLORIDE 500 MG/1
500 TABLET, EXTENDED RELEASE ORAL 2 TIMES DAILY WITH MEALS
Status: DISCONTINUED | OUTPATIENT
Start: 2024-10-21 | End: 2024-10-25 | Stop reason: HOSPADM

## 2024-10-21 RX ORDER — FUROSEMIDE 40 MG/1
40 TABLET ORAL
Status: DISCONTINUED | OUTPATIENT
Start: 2024-10-21 | End: 2024-10-24

## 2024-10-21 RX ADMIN — DONEPEZIL HYDROCHLORIDE 10 MG: 10 TABLET, FILM COATED ORAL at 21:19

## 2024-10-21 RX ADMIN — INSULIN HUMAN 4 UNITS: 100 INJECTION, SOLUTION PARENTERAL at 11:54

## 2024-10-21 RX ADMIN — FUROSEMIDE 40 MG: 40 TABLET ORAL at 21:19

## 2024-10-21 RX ADMIN — IPRATROPIUM BROMIDE AND ALBUTEROL SULFATE 3 ML: 2.5; .5 SOLUTION RESPIRATORY (INHALATION) at 07:31

## 2024-10-21 RX ADMIN — IPRATROPIUM BROMIDE AND ALBUTEROL SULFATE 3 ML: 2.5; .5 SOLUTION RESPIRATORY (INHALATION) at 11:55

## 2024-10-21 RX ADMIN — METOPROLOL SUCCINATE 50 MG: 50 TABLET, FILM COATED, EXTENDED RELEASE ORAL at 21:19

## 2024-10-21 RX ADMIN — SACUBITRIL AND VALSARTAN 1 TABLET: 24; 26 TABLET, FILM COATED ORAL at 21:19

## 2024-10-21 RX ADMIN — SACUBITRIL AND VALSARTAN 1 TABLET: 24; 26 TABLET, FILM COATED ORAL at 08:24

## 2024-10-21 RX ADMIN — ATORVASTATIN CALCIUM 10 MG: 20 TABLET, FILM COATED ORAL at 08:24

## 2024-10-21 RX ADMIN — MEMANTINE HYDROCHLORIDE 10 MG: 10 TABLET, FILM COATED ORAL at 08:24

## 2024-10-21 RX ADMIN — METFORMIN HYDROCHLORIDE 500 MG: 500 TABLET, EXTENDED RELEASE ORAL at 21:19

## 2024-10-21 RX ADMIN — IPRATROPIUM BROMIDE AND ALBUTEROL SULFATE 3 ML: 2.5; .5 SOLUTION RESPIRATORY (INHALATION) at 20:39

## 2024-10-21 RX ADMIN — SODIUM CHLORIDE TAB 1 GM 1 G: 1 TAB at 08:24

## 2024-10-21 RX ADMIN — MEMANTINE HYDROCHLORIDE 10 MG: 10 TABLET, FILM COATED ORAL at 21:19

## 2024-10-21 RX ADMIN — SODIUM CHLORIDE TAB 1 GM 1 G: 1 TAB at 21:19

## 2024-10-21 RX ADMIN — CLOPIDOGREL BISULFATE 75 MG: 75 TABLET, FILM COATED ORAL at 08:24

## 2024-10-21 RX ADMIN — ASPIRIN 81 MG: 81 TABLET, CHEWABLE ORAL at 08:24

## 2024-10-21 RX ADMIN — METOPROLOL SUCCINATE 50 MG: 50 TABLET, FILM COATED, EXTENDED RELEASE ORAL at 08:24

## 2024-10-21 RX ADMIN — INSULIN HUMAN 3 UNITS: 100 INJECTION, SOLUTION PARENTERAL at 16:50

## 2024-10-21 RX ADMIN — EMPAGLIFLOZIN 10 MG: 10 TABLET, FILM COATED ORAL at 21:19

## 2024-10-21 RX ADMIN — FUROSEMIDE 40 MG: 40 TABLET ORAL at 08:24

## 2024-10-21 RX ADMIN — CEFTRIAXONE SODIUM 1000 MG: 1 INJECTION, POWDER, FOR SOLUTION INTRAMUSCULAR; INTRAVENOUS at 01:11

## 2024-10-21 RX ADMIN — ENOXAPARIN SODIUM 40 MG: 100 INJECTION SUBCUTANEOUS at 15:22

## 2024-10-21 NOTE — PLAN OF CARE
Goal Outcome Evaluation:      Pt resting in bed. No c/o voiced. VSS No s/s of distress Plan of care ongoing

## 2024-10-21 NOTE — CASE MANAGEMENT/SOCIAL WORK
Continued Stay Note  [facility]     Patient Name: Kecia Martinez  MRN: [id number]  Today's Date: 10/21/2024    Admit Date: 10/18/2024    Plan: Return to [facility] pending approval from guardian   Discharge Plan       Row Name 10/21/24 1444       Plan    Plan Return to [facility] pending approval from guardian    Patient/Family in Agreement with Plan yes    Plan Comments Patient has a legal guardian; Dorys Johnson. CCP has called and left 2 voicemails today requesting a return call. CCP awaiting a return call from Dorys with approval for the patient to return to [facility]. CD, CSW.                   Discharge Codes    No documentation.

## 2024-10-21 NOTE — PROGRESS NOTES
"    Patient Name: Kecia Martinez  :1940  84 y.o.      Patient Care Team:  Ministerio Armas MD as PCP - General (Internal Medicine)    Chief Complaint: follow up NSTEMI     Interval History: moderate bilateral pleural effusions on CT. Coronary artery calcification noted. She doesn't feel well today but nothing specific. Feet hurt and she subjectively thinks they are swollen but I do not appreciate much swelling.        Objective   Vital Signs  Temp:  [98.1 °F (36.7 °C)-98.3 °F (36.8 °C)] 98.1 °F (36.7 °C)  Heart Rate:  [] 80  Resp:  [16-18] 18  BP: (120-144)/(60-70) 144/70    Intake/Output Summary (Last 24 hours) at 10/21/2024 1151  Last data filed at 10/21/2024 0656  Gross per 24 hour   Intake --   Output 1600 ml   Net -1600 ml     Flowsheet Rows      Flowsheet Row First Filed Value   Admission Height 158.5 cm (62.4\") Documented at 10/18/2024 1657   Admission Weight 56 kg (123 lb 7.3 oz) Documented at 10/18/2024 1657            Physical Exam:   General Appearance:    Alert, cooperative, in no acute distress   Lungs:     Clear to auscultation.  Normal respiratory effort and rate.      Heart:    Regular rhythm and normal rate, normal S1 and S2, no murmurs, gallops or rubs.     Chest Wall:    No abnormalities observed   Abdomen:     Soft, nontender, positive bowel sounds.     Extremities:   no cyanosis, clubbing or edema.  No marked joint deformities.  Adequate musculoskeletal strength.       Results Review:    Results from last 7 days   Lab Units 10/21/24  0505   SODIUM mmol/L 138   POTASSIUM mmol/L 3.5   CHLORIDE mmol/L 102   CO2 mmol/L 30.0*   BUN mg/dL 16   CREATININE mg/dL 0.77   GLUCOSE mg/dL 175*   CALCIUM mg/dL 8.6     Results from last 7 days   Lab Units 10/19/24  0548 10/18/24  2131 10/18/24  1711   HSTROP T ng/L 3,974* 2,113* 1,522*     Results from last 7 days   Lab Units 10/21/24  0505   WBC 10*3/mm3 9.24   HEMOGLOBIN g/dL 11.1*   HEMATOCRIT % 34.0   PLATELETS 10*3/mm3 175                        "    Medication Review:   aspirin, 81 mg, Oral, Daily  atorvastatin, 10 mg, Oral, Daily  cefTRIAXone, 1,000 mg, Intravenous, Q24H  clopidogrel, 75 mg, Oral, Daily  donepezil, 10 mg, Oral, Nightly  enoxaparin, 40 mg, Subcutaneous, Q24H  furosemide, 40 mg, Oral, Daily  insulin regular, 2-7 Units, Subcutaneous, Q6H  ipratropium-albuterol, 3 mL, Nebulization, Q6H - RT  memantine, 10 mg, Oral, BID  metoprolol succinate XL, 50 mg, Oral, BID  pantoprazole, 40 mg, Oral, Q AM  sacubitril-valsartan, 1 tablet, Oral, Q12H  sodium chloride, 1 g, Oral, BID              Assessment & Plan   Acute hypoxic respiratory failure from pulmonary edema. Possible pneumonia. On abx.   Bilateral pleural effusions R>L. On room air. Follow and see how she does. Could consider thoracentesis but I think she is doing ok right now and we can just watch.   NSTEMI by serial HS troponins 1522 -- 2113 -- 3974. Echocardiogram with preserved LVEF , hypokinesis of the basal inferior and inferolateral walls. Medical therapy with DAPT, statin/beta blocker. She is currently denying angina.   Diabetes mellitus type II  Dementia     Continue oral diuretics.   Medical therapy for NSTEMI as above.   I do not think she would tolerate cardiac rehab. She is not moving around much. Needs PT.     KELSEY Franks  [facility]  10/21/24  11:51 EDT

## 2024-10-21 NOTE — PLAN OF CARE
Goal Outcome Evaluation:     Patient disoriented to time and situation. Daughter at bedside through most of shift. Medicated per orders. Tolerating diet well. Plan of care ongoing.

## 2024-10-21 NOTE — PROGRESS NOTES
DAILY PROGRESS NOTE  [facility], [facility]    10/21/2024    Patient Identification:  Name: Kecia Martinez  Age: 84 y.o.  Sex: female  :  1940  MRN: [id number]           Primary Care Physician: Ministerio Armas MD    Subjective:    Interval History:    No new complaints.  Having breakfast.  Vital signs stable, saturation in room air is 100%.  Appreciate all consultant recommendation  2D echo results reviewed: EF 52.1%.  Blood sugar in the mid 100s, creatinine 0.77, WBC 9.24..    ROS:     No nausea, vomiting, diarrhea, constipation today.  No chest pain.  No shortness of breath.    Objective:    Scheduled Meds:  aspirin, 81 mg, Oral, Daily  atorvastatin, 10 mg, Oral, Daily  cefTRIAXone, 1,000 mg, Intravenous, Q24H  clopidogrel, 75 mg, Oral, Daily  donepezil, 10 mg, Oral, Nightly  enoxaparin, 40 mg, Subcutaneous, Q24H  furosemide, 40 mg, Oral, Daily  insulin regular, 2-7 Units, Subcutaneous, Q6H  ipratropium-albuterol, 3 mL, Nebulization, Q6H - RT  memantine, 10 mg, Oral, BID  metoprolol succinate XL, 50 mg, Oral, BID  pantoprazole, 40 mg, Oral, Q AM  sacubitril-valsartan, 1 tablet, Oral, Q12H  sodium chloride, 1 g, Oral, BID        Continuous Infusions:       PRN Meds:    acetaminophen    dextrose    dextrose    glucagon (human recombinant)    sodium chloride    [COMPLETED] Insert Peripheral IV **AND** sodium chloride    Intake/Output:    Intake/Output Summary (Last 24 hours) at 10/21/2024 0844  Last data filed at 10/21/2024 0656  Gross per 24 hour   Intake --   Output 1850 ml   Net -1850 ml         Exam:    T MAX 24 hrs: Temp (24hrs), Av.2 °F (36.8 °C), Min:98.1 °F (36.7 °C), Max:98.3 °F (36.8 °C)    Vitals Ranges:   Temp:  [98.1 °F (36.7 °C)-98.3 °F (36.8 °C)] 98.1 °F (36.7 °C)  Heart Rate:  [] 80  Resp:  [16-18] 18  BP: (120-144)/(60-70) 144/70    /70 (BP Location: Right arm, Patient Position: Lying)   Pulse 80   Temp 98.1 °F (36.7 °C) (Oral)   Resp  "18   Ht 160 cm (63\")   Wt 55.8 kg (123 lb)   SpO2 100%   BMI 21.79 kg/m²     General: Alert, oriented x 1-2. Cooperative, no distress this morning, appears stated age  Neck: Supple, symmetrical, trachea midline, no adenopathy;              thyroid:  no enlargement/tenderness/nodules;              no carotid bruit or JVD  Cardiovascular: Tachycardic, regular rhythm and intact distal pulses.              Exam reveals no gallop and no friction rub. No murmur heard  Pulmonary: Decreased breath sounds bilaterally.  Few rhonchi at bases.  No wheezing, no rales.    Abdominal: Soft, nontender, bowel sounds active all four quadrants,     no masses, no hepatomegaly, no splenomegaly.   Extremities: Normal, atraumatic, no cyanosis or edema  Pulses: 2 + symmetric all extremities  Neurological: Patient is alert and oriented to person.  No new focal sensorimotor deficit.  Skin: Skin color, texture, normal. Turgor is decreased. No rashes or lesions        Data Review:    Results from last 7 days   Lab Units 10/21/24  0505 10/20/24  0434 10/19/24  0548   WBC 10*3/mm3 9.24 15.09* 11.34*   HEMOGLOBIN g/dL 11.1* 10.4* 9.8*   HEMATOCRIT % 34.0 31.7* 28.8*   PLATELETS 10*3/mm3 175 170 160       Results from last 7 days   Lab Units 10/21/24  0505 10/20/24  0434 10/19/24  0548 10/18/24  1711   SODIUM mmol/L 138 140 138 136   POTASSIUM mmol/L 3.5 3.6 4.2 5.0   CHLORIDE mmol/L 102 102 101 101   CO2 mmol/L 30.0* 27.0 25.4 19.4*   BUN mg/dL 16 25* 25* 24*   CREATININE mg/dL 0.77 1.07* 1.11* 1.02*   CALCIUM mg/dL 8.6 8.6 8.7 9.7   BILIRUBIN mg/dL 0.3  --   --  0.4   ALK PHOS U/L 75  --   --  85   ALT (SGPT) U/L 11  --   --  12   AST (SGOT) U/L 26  --   --  91*   GLUCOSE mg/dL 175* 109* 187* 359*                 Lab Results   Lab Value Date/Time    TROPONINT 3,974 (C) 10/19/2024 0548    TROPONINT 2,113 (C) 10/18/2024 2131    TROPONINT 1,522 (C) 10/18/2024 1711    TROPONINT 25 (H) 11/02/2023 1210    TROPONINT 24 (H) 11/02/2023 1011 "       Microbiology Results (last 10 days)       Procedure Component Value - Date/Time    Blood Culture - Blood, Arm, Left [[id number]]  (Normal) Collected: 10/18/24 2131    Lab Status: Preliminary result Specimen: Blood from Arm, Left Updated: 10/20/24 2146     Blood Culture No growth at 2 days    Blood Culture - Blood, Arm, Right [[id number]]  (Normal) Collected: 10/18/24 2131    Lab Status: Preliminary result Specimen: Blood from Arm, Right Updated: 10/20/24 2146     Blood Culture No growth at 2 days    Respiratory Panel PCR w/COVID-19(SARS-CoV-2) DAR/OUMAR/ANDREW/PAD/COR/ANUPAM In-House, NP Swab in UTM/VTM, 2 HR TAT - Swab, Nasopharynx [[id number]]  (Normal) Collected: 10/18/24 1711    Lab Status: Final result Specimen: Swab from Nasopharynx Updated: 10/18/24 1819     ADENOVIRUS, PCR Not Detected     Coronavirus 229E Not Detected     Coronavirus HKU1 Not Detected     Coronavirus NL63 Not Detected     Coronavirus OC43 Not Detected     COVID19 Not Detected     Human Metapneumovirus Not Detected     Human Rhinovirus/Enterovirus Not Detected     Influenza A PCR Not Detected     Influenza B PCR Not Detected     Parainfluenza Virus 1 Not Detected     Parainfluenza Virus 2 Not Detected     Parainfluenza Virus 3 Not Detected     Parainfluenza Virus 4 Not Detected     RSV, PCR Not Detected     Bordetella pertussis pcr Not Detected     Bordetella parapertussis PCR Not Detected     Chlamydophila pneumoniae PCR Not Detected     Mycoplasma pneumo by PCR Not Detected    Narrative:      In the setting of a positive respiratory panel with a viral infection PLUS a negative procalcitonin without other underlying concern for bacterial infection, consider observing off antibiotics or discontinuation of antibiotics and continue supportive care. If the respiratory panel is positive for atypical bacterial infection (Bordetella pertussis, Chlamydophila pneumoniae, or Mycoplasma pneumoniae), consider antibiotic de-escalation to target atypical  bacterial infection.             Imaging Results (Last 7 Days)       Procedure Component Value Units Date/Time    CT Chest Without Contrast Diagnostic [[id number]] Collected: 10/20/24 1621     Updated: 10/20/24 1628    Narrative:      CT CHEST WO CONTRAST DIAGNOSTIC-     Radiation dose reduction techniques were utilized, including automated  exposure control and exposure modulation based on body size.           FINDINGS:  1. The esophagus is satisfactory in course and caliber. Heart size  within normal limits. Heavy coronary artery calcification.  Atherosclerotic calcification of a normal diameter aorta.     2. Moderate-sized bilateral pleural effusions, free-flowing, size on the  right greater than the left. Consolidation/atelectasis at the base of  both lower lobes, likewise greater on the right than the left. Patchy  areas of groundglass infiltrate demonstrated within the right and left  lower lobes as well as the right upper lobe.     3. 6 mm noncalcified nodular density right upper lobe on image #36.  Adjacent similar 3 mm nodule seen on image 32. This could be  infectious/inflammatory or neoplastic. Short-term follow-up recommended  in 6 months.     4. There is some fullness within the right hilum, cannot exclude mildly  enlarged lymph nodes. There is an enlarged precarinal lymph node within  the mediastinum measuring 11 mm.     5. Limited images through the upper abdomen are unremarkable. No acute  osseous abnormality. Remainder is unremarkable.              This report was finalized on 10/20/2024 4:25 PM by Dr. Adiel Mason M.D on Workstation: [id number]       XR Chest 1 View [[id number]] Collected: 10/19/24 1550     Updated: 10/19/24 1555    Narrative:      XR CHEST 1 VW-     HISTORY: Female who is 84 years-old, short of breath     TECHNIQUE: Frontal views of the chest     COMPARISON: 10/18/2024     FINDINGS: The heart size is normal. Aorta is calcified. Pulmonary  vasculature is slightly congested.  Increased opacity at the right mid to  lower lung suggests increased atelectasis/infiltrate/pleural effusion.  Persistent left basilar atelectasis/infiltrate/effusion. No  pneumothorax. No acute osseous process.       Impression:      Persistent right more than left opacities, mildly increased  on the right.     This report was finalized on 10/19/2024 3:51 PM by Dr. Javed Terrell M.D on Workstation: [id number]       XR Chest 1 View [[id number]] Collected: 10/18/24 1746     Updated: 10/18/24 1750    Narrative:      XR CHEST 1 VW-     Clinical: Shortness of breath     FINDINGS: Cardiac size within normal limits, there is atherosclerotic  calcification of the aorta. There is vascular congestion with vague  opacity at both lung bases, greater on the right than the left, likely  associated pleural effusion effusions. There is bibasilar atelectasis  and/or infiltrate, pneumonia not excluded. Remainder is unremarkable.     This report was finalized on 10/18/2024 5:47 PM by Dr. Adiel Mason M.D on Workstation: [id number]                 Assessment:        Acute hypoxemic respiratory failure  Non-ST elevation MI  Pulmonary edema  Leukocytosis, rule out infection, cannot rule out pneumonia.  Hypertension  Hyperlipidemia  Coronary artery disease, history MI  Peripheral vascular disease  Diabetes mellitus  Alzheimer's dementia        Plan:    Overall, stable.  On antibiotics for possible pneumonia.  Chest CT still showed bilateral pleural effusion greater in the right side.  Will increase Lasix to see whether that helps.  Continue treatment for non-ST elevation MI, on medical therapy at this time.  Appreciate cardiology input.  2D echo with preserved ejection fraction, but hypokinesis of basal inferior and inferolateral walls.  Continue aspirin, Plavix, statin, metoprolol.  Monitor volume status, appears to be euvolemic.  But with bilateral pleural effusion, will increase Lasix.  Monitor and correct electrolytes,  stable at this time.  Accu-Chek and SSI, blood sugar in the mid  100s.  Will continue to adjust accordingly.  Monitor mental status, back to baseline at this time.    DVT prophylaxis: Lovenox dose for prophylaxis.    Stress ulcer prophylaxis: Pepcid.  Labs in       Ministerio Armas MD  10/21/2024  08:44 EDT

## 2024-10-22 LAB
ANION GAP SERPL CALCULATED.3IONS-SCNC: 11.9 MMOL/L (ref 5–15)
BUN SERPL-MCNC: 21 MG/DL (ref 8–23)
BUN/CREAT SERPL: 20.6 (ref 7–25)
CALCIUM SPEC-SCNC: 8.4 MG/DL (ref 8.6–10.5)
CHLORIDE SERPL-SCNC: 97 MMOL/L (ref 98–107)
CO2 SERPL-SCNC: 28.1 MMOL/L (ref 22–29)
CREAT SERPL-MCNC: 1.02 MG/DL (ref 0.57–1)
EGFRCR SERPLBLD CKD-EPI 2021: 55 ML/MIN/1.73
GLUCOSE BLDC GLUCOMTR-MCNC: 157 MG/DL (ref 70–130)
GLUCOSE BLDC GLUCOMTR-MCNC: 157 MG/DL (ref 70–130)
GLUCOSE BLDC GLUCOMTR-MCNC: 211 MG/DL (ref 70–130)
GLUCOSE BLDC GLUCOMTR-MCNC: 322 MG/DL (ref 70–130)
GLUCOSE SERPL-MCNC: 187 MG/DL (ref 65–99)
POTASSIUM SERPL-SCNC: 3.8 MMOL/L (ref 3.5–5.2)
SODIUM SERPL-SCNC: 137 MMOL/L (ref 136–145)

## 2024-10-22 PROCEDURE — 97162 PT EVAL MOD COMPLEX 30 MIN: CPT

## 2024-10-22 PROCEDURE — 25010000002 ENOXAPARIN PER 10 MG: Performed by: INTERNAL MEDICINE

## 2024-10-22 PROCEDURE — 94799 UNLISTED PULMONARY SVC/PX: CPT

## 2024-10-22 PROCEDURE — 80048 BASIC METABOLIC PNL TOTAL CA: CPT | Performed by: INTERNAL MEDICINE

## 2024-10-22 PROCEDURE — 97530 THERAPEUTIC ACTIVITIES: CPT

## 2024-10-22 PROCEDURE — 94664 DEMO&/EVAL PT USE INHALER: CPT

## 2024-10-22 PROCEDURE — 99232 SBSQ HOSP IP/OBS MODERATE 35: CPT | Performed by: NURSE PRACTITIONER

## 2024-10-22 PROCEDURE — 25010000002 CEFTRIAXONE PER 250 MG: Performed by: INTERNAL MEDICINE

## 2024-10-22 PROCEDURE — 63710000001 INSULIN REGULAR HUMAN PER 5 UNITS: Performed by: INTERNAL MEDICINE

## 2024-10-22 PROCEDURE — 94761 N-INVAS EAR/PLS OXIMETRY MLT: CPT

## 2024-10-22 PROCEDURE — 82948 REAGENT STRIP/BLOOD GLUCOSE: CPT

## 2024-10-22 RX ORDER — CEFDINIR 300 MG/1
300 CAPSULE ORAL EVERY 12 HOURS SCHEDULED
Status: COMPLETED | OUTPATIENT
Start: 2024-10-22 | End: 2024-10-24

## 2024-10-22 RX ADMIN — INSULIN HUMAN 2 UNITS: 100 INJECTION, SOLUTION PARENTERAL at 18:03

## 2024-10-22 RX ADMIN — SODIUM CHLORIDE TAB 1 GM 1 G: 1 TAB at 08:16

## 2024-10-22 RX ADMIN — FUROSEMIDE 40 MG: 40 TABLET ORAL at 08:16

## 2024-10-22 RX ADMIN — CLOPIDOGREL BISULFATE 75 MG: 75 TABLET, FILM COATED ORAL at 08:16

## 2024-10-22 RX ADMIN — ATORVASTATIN CALCIUM 10 MG: 20 TABLET, FILM COATED ORAL at 08:17

## 2024-10-22 RX ADMIN — METOPROLOL SUCCINATE 50 MG: 50 TABLET, FILM COATED, EXTENDED RELEASE ORAL at 23:00

## 2024-10-22 RX ADMIN — DONEPEZIL HYDROCHLORIDE 10 MG: 10 TABLET, FILM COATED ORAL at 20:29

## 2024-10-22 RX ADMIN — IPRATROPIUM BROMIDE AND ALBUTEROL SULFATE 3 ML: 2.5; .5 SOLUTION RESPIRATORY (INHALATION) at 06:47

## 2024-10-22 RX ADMIN — METFORMIN HYDROCHLORIDE 500 MG: 500 TABLET, EXTENDED RELEASE ORAL at 08:16

## 2024-10-22 RX ADMIN — IPRATROPIUM BROMIDE AND ALBUTEROL SULFATE 3 ML: 2.5; .5 SOLUTION RESPIRATORY (INHALATION) at 11:52

## 2024-10-22 RX ADMIN — INSULIN HUMAN 5 UNITS: 100 INJECTION, SOLUTION PARENTERAL at 08:28

## 2024-10-22 RX ADMIN — ENOXAPARIN SODIUM 40 MG: 100 INJECTION SUBCUTANEOUS at 15:39

## 2024-10-22 RX ADMIN — METFORMIN HYDROCHLORIDE 500 MG: 500 TABLET, EXTENDED RELEASE ORAL at 18:03

## 2024-10-22 RX ADMIN — FUROSEMIDE 40 MG: 40 TABLET ORAL at 18:03

## 2024-10-22 RX ADMIN — INSULIN HUMAN 3 UNITS: 100 INJECTION, SOLUTION PARENTERAL at 11:44

## 2024-10-22 RX ADMIN — CEFTRIAXONE SODIUM 1000 MG: 1 INJECTION, POWDER, FOR SOLUTION INTRAMUSCULAR; INTRAVENOUS at 01:00

## 2024-10-22 RX ADMIN — MEMANTINE HYDROCHLORIDE 10 MG: 10 TABLET, FILM COATED ORAL at 08:16

## 2024-10-22 RX ADMIN — EMPAGLIFLOZIN 10 MG: 10 TABLET, FILM COATED ORAL at 08:16

## 2024-10-22 RX ADMIN — ASPIRIN 81 MG: 81 TABLET, CHEWABLE ORAL at 08:16

## 2024-10-22 RX ADMIN — SACUBITRIL AND VALSARTAN 1 TABLET: 24; 26 TABLET, FILM COATED ORAL at 08:16

## 2024-10-22 RX ADMIN — SODIUM CHLORIDE TAB 1 GM 1 G: 1 TAB at 20:28

## 2024-10-22 RX ADMIN — IPRATROPIUM BROMIDE AND ALBUTEROL SULFATE 3 ML: 2.5; .5 SOLUTION RESPIRATORY (INHALATION) at 21:25

## 2024-10-22 RX ADMIN — METOPROLOL SUCCINATE 50 MG: 50 TABLET, FILM COATED, EXTENDED RELEASE ORAL at 08:16

## 2024-10-22 RX ADMIN — CEFDINIR 300 MG: 300 CAPSULE ORAL at 14:24

## 2024-10-22 RX ADMIN — MEMANTINE HYDROCHLORIDE 10 MG: 10 TABLET, FILM COATED ORAL at 20:28

## 2024-10-22 RX ADMIN — ACETAMINOPHEN 325MG 650 MG: 325 TABLET ORAL at 08:21

## 2024-10-22 RX ADMIN — SACUBITRIL AND VALSARTAN 1 TABLET: 24; 26 TABLET, FILM COATED ORAL at 20:28

## 2024-10-22 RX ADMIN — CEFDINIR 300 MG: 300 CAPSULE ORAL at 20:29

## 2024-10-22 NOTE — THERAPY EVALUATION
Patient Name: Kecia Martinez  : 1940    MRN: [id number]                              Today's Date: 10/22/2024       Admit Date: 10/18/2024    Visit Dx:     ICD-10-CM ICD-9-CM   1. Acute hypoxemic respiratory failure  J96.01 518.81   2. Hypervolemia, unspecified hypervolemia type  E87.70 276.69   3. Elevated brain natriuretic peptide (BNP) level  R79.89 790.99   4. Hyperglycemia due to diabetes mellitus  E11.65 250.02   5. Elevated troponin  R79.89 790.6     Patient Active Problem List   Diagnosis    Vision loss of left eye    Acute hypoxemic respiratory failure     Past Medical History:   Diagnosis Date    Alzheimer disease     Myocardial infarct, old     Type 2 diabetes mellitus      History reviewed. No pertinent surgical history.   General Information       Row Name 10/22/24 1539          Physical Therapy Time and Intention    Document Type evaluation  -CW     Mode of Treatment individual therapy;physical therapy  -CW       Row Name 10/22/24 1539          General Information    Patient Profile Reviewed yes  -CW     Prior Level of Function independent:;transfer;all household mobility;bed mobility  -CW     Existing Precautions/Restrictions fall  -CW     Barriers to Rehab medically complex  -CW       Row Name 10/22/24 1539          Living Environment    People in Home facility resident  -CW       Row Name 10/22/24 1539          Home Main Entrance    Number of Stairs, Main Entrance none  -CW       Row Name 10/22/24 1539          Stairs Within Home, Primary    Number of Stairs, Within Home, Primary none  -CW       Row Name 10/22/24 1539          Cognition    Orientation Status (Cognition) oriented to;person  -CW       Row Name 10/22/24 1539          Safety Issues/Impairments Affecting Functional Mobility    Impairments Affecting Function (Mobility) balance;endurance/activity tolerance;strength  -CW               User Key  (r) = Recorded By, (t) = Taken By, (c) = Cosigned By      Initials Name Provider Type    CW  Mary Castillo, LIONEL Physical Therapist                   Mobility       Row Name 10/22/24 1540          Bed Mobility    Bed Mobility supine-sit;sit-supine  -CW     Supine-Sit [place] (Bed Mobility) standby assist  -CW     Sit-Supine [place] (Bed Mobility) standby assist  -CW     Assistive Device (Bed Mobility) head of bed elevated  -CW     Comment, (Bed Mobility) sba for sitting EOB apporx 5 mins  -CW       Row Name 10/22/24 1540          Transfers    Comment, (Transfers) Pt declined due to LLE pain  -CW               User Key  (r) = Recorded By, (t) = Taken By, (c) = Cosigned By      Initials Name Provider Type    CW Mary Castillo PT Physical Therapist                   Obj/Interventions       Row Name 10/22/24 1541          Range of Motion Comprehensive    General Range of Motion bilateral lower extremity ROM WFL  -CW       Row Name 10/22/24 1541          Strength Comprehensive (MMT)    Comment, General Manual Muscle Testing (MMT) Assessment Generalized weakness  -CW       Row Name 10/22/24 1541          Balance    Balance Assessment sitting static balance  -CW     Static Sitting Balance standby assist  -CW     Position, Sitting Balance sitting edge of bed  -CW               User Key  (r) = Recorded By, (t) = Taken By, (c) = Cosigned By      Initials Name Provider Type    CW Mary Castillo PT Physical Therapist                   Goals/Plan       Row Name 10/22/24 1550          Bed Mobility Goal 1 (PT)    Activity/Assistive Device (Bed Mobility Goal 1, PT) bed mobility activities, all  -CW     [place] Level/Cues Needed (Bed Mobility Goal 1, PT) modified independence  -CW     Time Frame (Bed Mobility Goal 1, PT) 2 weeks  -CW       Row Name 10/22/24 1550          Transfer Goal 1 (PT)    Activity/Assistive Device (Transfer Goal 1, PT) sit-to-stand/stand-to-sit;bed-to-chair/chair-to-bed  -CW     [place] Level/Cues Needed (Transfer Goal 1, PT) minimum assist (75% or more patient effort)   "-CW     Time Frame (Transfer Goal 1, PT) 2 weeks  -CW       Row Name 10/22/24 8234          Gait Training Goal 1 (PT)    Activity/Assistive Device (Gait Training Goal 1, PT) gait (walking locomotion)  -CW     [place] Level (Gait Training Goal 1, PT) minimum assist (75% or more patient effort)  -CW     Distance (Gait Training Goal 1, PT) 20'  -CW     Time Frame (Gait Training Goal 1, PT) 2 weeks  -CW       Row Name 10/22/24 1534          Therapy Assessment/Plan (PT)    Planned Therapy Interventions (PT) balance training;bed mobility training;gait training;transfer training;postural re-education;ROM (range of motion);strengthening;patient/family education  -CW               User Key  (r) = Recorded By, (t) = Taken By, (c) = Cosigned By      Initials Name Provider Type    CW Mary Castillo, PT Physical Therapist                   Clinical Impression       Row Name 10/22/24 2558          Pain    Pretreatment Pain Rating 0/10 - no pain  -CW     Posttreatment Pain Rating 0/10 - no pain  -CW       Row Name 10/22/24 3384          Plan of Care Review    Plan of Care Reviewed With patient  -CW     Outcome Evaluation Pt is an 85 yo female admitted with SOA and Nstemi. Pt HX of dementia, resides in LTC. Chart indicates she is ambulatory with a walker, pt not able to provide reliable PLOF. Pt completed all bed mobility with sba, tolerated sitting EOB sba. Pt reports LLE pain from knee to foot and describes as \"on fire.\" Due to LLE pain, pt declined standing or transfer attempts. Pt may benefit from ongoing skilled PT services while inpatient to address deficits. Anticipate return to LTC.  -CW       Row Name 10/22/24 9808          Therapy Assessment/Plan (PT)    Rehab Potential (PT) fair  -CW     Criteria for Skilled Interventions Met (PT) yes  -CW     Therapy Frequency (PT) 3 times/wk  -CW       Row Name 10/22/24 3030          Vital Signs    O2 Delivery Pre Treatment room air  -CW       Row Name 10/22/24 1660          " Positioning and Restraints    Pre-Treatment Position in bed  -CW     Post Treatment Position bed  -CW     In Bed notified nsg;call light within reach;encouraged to call for assist;exit alarm on;fowlers  -               User Key  (r) = Recorded By, (t) = Taken By, (c) = Cosigned By      Initials Name Provider Type    Mary Casper PT Physical Therapist                   Outcome Measures       Row Name 10/22/24 1550          How much help from another person do you currently need...    Turning from your back to your side while in flat bed without using bedrails? 3  -CW     Moving from lying on back to sitting on the side of a flat bed without bedrails? 3  -CW     Moving to and from a bed to a chair (including a wheelchair)? 3  -CW     Standing up from a chair using your arms (e.g., wheelchair, bedside chair)? 3  -CW     Climbing 3-5 steps with a railing? 1  -CW     To walk in hospital room? 2  -CW     AM-PAC 6 Clicks Score (PT) 15  -CW     Highest Level of Mobility Goal 4 --> Transfer to chair/commode  -CW       Row Name 10/22/24 1550          Functional Assessment    Outcome Measure Options AM-PAC 6 Clicks Basic Mobility (PT)  -CW               User Key  (r) = Recorded By, (t) = Taken By, (c) = Cosigned By      Initials Name Provider Type    Mary Casper PT Physical Therapist                                 Physical Therapy Education       Title: PT OT SLP Therapies (In Progress)       Topic: Physical Therapy (In Progress)       Point: Mobility training (Done)       Learning Progress Summary            Patient Acceptance, E, VU,NR by  at 10/22/2024 1551                      Point: Home exercise program (Not Started)       Learner Progress:  Not documented in this visit.              Point: Body mechanics (Not Started)       Learner Progress:  Not documented in this visit.              Point: Precautions (Not Started)       Learner Progress:  Not documented in this visit.                          "     User Key       Initials Effective Dates Name Provider Type Discipline    CW 12/13/22 -  Mary Castillo PT Physical Therapist PT                  PT Recommendation and Plan  Planned Therapy Interventions (PT): balance training, bed mobility training, gait training, transfer training, postural re-education, ROM (range of motion), strengthening, patient/family education  Outcome Evaluation: Pt is an 83 yo female admitted with SOA and Nstemi. Pt HX of dementia, resides in LTC. Chart indicates she is ambulatory with a walker, pt not able to provide reliable PLOF. Pt completed all bed mobility with sba, tolerated sitting EOB sba. Pt reports LLE pain from knee to foot and describes as \"on fire.\" Due to LLE pain, pt declined standing or transfer attempts. Pt may benefit from ongoing skilled PT services while inpatient to address deficits. Anticipate return to LTC.     Time Calculation:         PT Charges       Row Name 10/22/24 1538             Time Calculation    Start Time 0820  -CW      Stop Time 0841  -CW      Time Calculation (min) 21 min  -CW      PT Received On 10/22/24  -CW      PT - Next Appointment 10/24/24  -CW      PT Goal Re-Cert Due Date 11/05/24  -CW         Time Calculation- PT    Total Timed Code Minutes- PT 15 minute(s)  -CW         Timed Charges    13665 - PT Therapeutic Activity Minutes 15  -CW         Total Minutes    Timed Charges Total Minutes 15  -CW       Total Minutes 15  -CW                User Key  (r) = Recorded By, (t) = Taken By, (c) = Cosigned By      Initials Name Provider Type    CW Mary Castillo PT Physical Therapist                  Therapy Charges for Today       Code Description Service Date Service Provider Modifiers Qty    04069683973  PT EVAL MOD COMPLEXITY 3 10/22/2024 Mary Castillo, PT GP 1    [id number]  PT THERAPEUTIC ACT EA 15 MIN 10/22/2024 Mary Castillo, PT GP 1            PT G-Codes  Outcome Measure Options: AM-PAC 6 Clicks Basic Mobility " (PT)  AM-PAC 6 Clicks Score (PT): 15  PT Discharge Summary  Anticipated Discharge Disposition (PT): extended care facility    Mary Castillo, LIONEL  10/22/2024

## 2024-10-22 NOTE — CASE MANAGEMENT/SOCIAL WORK
Continued Stay Note  [facility]     Patient Name: Kecia Martinez  MRN: [id number]  Today's Date: 10/22/2024    Admit Date: 10/18/2024    Plan: Return to [facility]   Discharge Plan       Row Name 10/22/24 4919       Plan    Plan Return to [facility]    Patient/Family in Agreement with Plan yes    Plan Comments Spoke with Dorys Johnson pt's legal state guardian. She reports pt is to return to LTC at [place]. Family is listed, she reports family can visit and know information but can't make any decisions for pt. Packet in office. Pt will need EMS transport.      Row Name 10/22/24 1035       Plan    Plan [facility]; VM left for [facility]    Patient/Family in Agreement with Plan unable to assess  VM left for guardian    Plan Comments Spoke with Lana pt is from LT with bed hold. Spoke with MD who reports pt is from memory care. VM left for pt's state guardian Dorys Johnson [phone] to confirm plan. Packet in office. Pt will need transport arranged at d/c. CCP to follow.                   Discharge Codes    No documentation.                 Expected Discharge Date and Time       Expected Discharge Date Expected Discharge Time    Oct 23, 2024               MAURO Henao

## 2024-10-22 NOTE — CASE MANAGEMENT/SOCIAL WORK
Discharge Planning Assessment  [facility]     Patient Name: Kecia Martinez  MRN: [id number]  Today's Date: 10/22/2024    Admit Date: 10/18/2024    Plan: [facility]; VM left for [facility]   Discharge Needs Assessment       Row Name 10/22/24 1032       Living Environment    People in Home facility resident    Current Living Arrangements extended care facility    Potentially Unsafe Housing Conditions none    Primary Care Provided by other (see comments)  staff    Provides Primary Care For no one    Family Caregiver if Needed none  pt has state guardian       Resource/Environmental Concerns    Resource/Environmental Concerns none       Transition Planning    Patient/Family Anticipates Transition to long-term care facility    Patient/Family Anticipated Services at Transition none       Discharge Needs Assessment    Readmission Within the Last 30 Days no previous admission in last 30 days    Equipment Currently Used at Home wheelchair    Concerns to be Addressed no discharge needs identified    Provided Post Acute Provider List? N/A    N/A Provider List Comment pt has a state guardian in LTC; VM left for guardian Radha Elizabeth [phone]                   Discharge Plan       Row Name 10/22/24 1030       Plan    Plan [facility]; VM left for [facility]    Patient/Family in Agreement with Plan unable to assess  VM left for guardian    Plan Comments Spoke with Lana pt is from LTC with bed hold. Spoke with MD who reports pt is from memory care. VM left for pt's state guardian Dorys Elizabeth [phone] to confirm plan. Packet in office. Pt will need transport arranged at d/c. CCP to follow.                  Continued Care and Services - Admitted Since 10/18/2024       Destination Coordination complete.      Service Provider Request Status Services Address Phone Fax Patient Preferred    [facility]  [place] [address] [phone] --                   Expected Discharge Date and Time       Expected Discharge Date Expected Discharge Time    Oct 23, 2024            Demographic Summary    No documentation.                  Functional Status    No documentation.                  Psychosocial    No documentation.                  Abuse/Neglect    No documentation.                  Legal    No documentation.                  Substance Abuse    No documentation.                  Patient Forms    No documentation.                     MAURO Henao

## 2024-10-22 NOTE — DISCHARGE PLACEMENT REQUEST
"Mario Mazariegos (84 y.o. Female)       Date of Birth   1940    Social Security Number       Address   [address] [place] [address]    Home Phone   [phone]    MRN   [id number]       Judaism   None    Marital Status                               Admission Date   10/18/24    Admission Type   Emergency    Admitting Provider   Ministerio Armas MD    Attending Provider   Ministerio Armas MD    Department, Room/Bed   [facility], 3111/1       Discharge Date       Discharge Disposition       Discharge Destination                                 Attending Provider: Ministerio Armas MD    Allergies: Penicillins, Sulfa Antibiotics, Sulfanilamide    Isolation: Contact   Infection: Candida Auris (rule out) (10/21/24)   Code Status: CPR    Ht: 160 cm (63\")   Wt: 55.8 kg (123 lb)    Admission Cmt: None   Principal Problem: Acute hypoxemic respiratory failure [J96.01]                   Active Insurance as of 10/18/2024       Primary Coverage       Payor Plan Insurance Group Employer/Plan Group    MEDICARE MEDICARE A & B        Payor Plan Address Payor Plan Phone Number Payor Plan Fax Number Effective Dates    [address] [phone]  8/1/2007 - None Entered    [place] [address]         Subscriber Name Subscriber Birth Date Member ID       MARIO MAZARIEGOS 1940 [id number]                     Emergency Contacts        (Rel.) Home Phone Work Phone Mobile Phone    DANIELLA ANDRES (Other) -- -- [phone]    Afterhours,Guardianship (Legal Guardian) -- [phone] --    LILIANA CASTILLO (Daughter) -- -- [phone]    Pietro Mazariegos [phone] -- --          "

## 2024-10-22 NOTE — PLAN OF CARE
Goal Outcome Evaluation:  Patient disoriented to time and situation. C/o leg pain - tylenol given. Abx given PO. VSS. SR on monitor. Plan of care ongoing.

## 2024-10-22 NOTE — CASE MANAGEMENT/SOCIAL WORK
Continued Stay Note  [facility]     Patient Name: Kecia Martinez  MRN: [id number]  Today's Date: 10/22/2024    Admit Date: 10/18/2024    Plan: Return to [facility]   Discharge Plan       Row Name 10/22/24 5272       Plan    Plan Comments Spoke with pts daughter in room. She has concerns about pt returning to [place] however pt has state guardian. VM left for Dorys Johnson to call daughter Jennifer.      Row Name 10/22/24 0731       Plan    Plan Return to [facility]    Patient/Family in Agreement with Plan yes    Plan Comments Spoke with Dorys Johnson pt's legal state guardian. She reports pt is to return to LTC at [place]. Family is listed, she reports family can visit and know information but can't make any decisions for pt. Packet in office. Pt will need EMS transport.                   Discharge Codes    No documentation.                 Expected Discharge Date and Time       Expected Discharge Date Expected Discharge Time    Oct 23, 2024               MAURO Henao

## 2024-10-22 NOTE — PROGRESS NOTES
"    Patient Name: Kecia Martinez  :1940  84 y.o.      Patient Care Team:  Ministerio Armas MD as PCP - General (Internal Medicine)    Chief Complaint: follow up NSTEMI     Interval History: she is on room air. She can't remember if she walked much prior to hospital but says she can't walk now but of leg pain. She denies CP / SOA .     Objective   Vital Signs  Temp:  [97.9 °F (36.6 °C)-98.3 °F (36.8 °C)] 98 °F (36.7 °C)  Heart Rate:  [82-96] 93  Resp:  [16-18] 18  BP: (126-133)/(62-77) 133/77    Intake/Output Summary (Last 24 hours) at 10/22/2024 1341  Last data filed at 10/22/2024 0912  Gross per 24 hour   Intake 360 ml   Output 2850 ml   Net -2490 ml     Flowsheet Rows      Flowsheet Row First Filed Value   Admission Height 158.5 cm (62.4\") Documented at 10/18/2024 1657   Admission Weight 56 kg (123 lb 7.3 oz) Documented at 10/18/2024 1657            Physical Exam:   General Appearance:    Alert, cooperative, in no acute distress   Lungs:     Clear to auscultation.  Normal respiratory effort and rate.      Heart:    Regular rhythm and normal rate, normal S1 and S2, no murmurs, gallops or rubs.     Chest Wall:    No abnormalities observed   Abdomen:     Soft, nontender, positive bowel sounds.     Extremities:   no cyanosis, clubbing or edema.  No marked joint deformities.  Adequate musculoskeletal strength.       Results Review:    Results from last 7 days   Lab Units 10/21/24  0505   SODIUM mmol/L 138   POTASSIUM mmol/L 3.5   CHLORIDE mmol/L 102   CO2 mmol/L 30.0*   BUN mg/dL 16   CREATININE mg/dL 0.77   GLUCOSE mg/dL 175*   CALCIUM mg/dL 8.6     Results from last 7 days   Lab Units 10/19/24  0548 10/18/24  2131 10/18/24  1711   HSTROP T ng/L 3,974* 2,113* 1,522*     Results from last 7 days   Lab Units 10/21/24  0505   WBC 10*3/mm3 9.24   HEMOGLOBIN g/dL 11.1*   HEMATOCRIT % 34.0   PLATELETS 10*3/mm3 175                           Medication Review:   aspirin, 81 mg, Oral, Daily  atorvastatin, 10 mg, Oral, " Daily  cefdinir, 300 mg, Oral, Q12H  clopidogrel, 75 mg, Oral, Daily  donepezil, 10 mg, Oral, Nightly  empagliflozin, 10 mg, Oral, Daily  enoxaparin, 40 mg, Subcutaneous, Q24H  furosemide, 40 mg, Oral, BID  insulin regular, 2-7 Units, Subcutaneous, Q6H  ipratropium-albuterol, 3 mL, Nebulization, Q6H - RT  memantine, 10 mg, Oral, BID  metFORMIN ER, 500 mg, Oral, BID With Meals  metoprolol succinate XL, 50 mg, Oral, BID  pantoprazole, 40 mg, Oral, Q AM  sacubitril-valsartan, 1 tablet, Oral, Q12H  sodium chloride, 1 g, Oral, BID              Assessment & Plan   Acute hypoxic respiratory failure from pulmonary edema. Possible pneumonia. On abx.   Bilateral pleural effusions R>L. On room air. Follow and see how she does. Could consider thoracentesis but I think she is doing ok right now and we can just watch.   NSTEMI by serial HS troponins 1522 -- 2113 -- 3974. Echocardiogram with preserved LVEF , hypokinesis of the basal inferior and inferolateral walls. Medical therapy with DAPT, statin/beta blocker. She is currently denying angina.   Diabetes mellitus type II  Dementia     Continue oral diuretics.   Medical therapy for NSTEMI as above.   I do not think she would tolerate cardiac rehab. She is not moving around much. Needs PT. Defer to primary.     Consider thoracentesis if symptomatic. Symptoms hard to stefani.     Cardiac status appears stable. Will see as needed.     KELSEY Franks  [facility]  10/22/24  13:41 EDT

## 2024-10-22 NOTE — PLAN OF CARE
"Goal Outcome Evaluation:  Plan of Care Reviewed With: patient           Outcome Evaluation: Pt is an 83 yo female admitted with SOA and Nstemi. Pt HX of dementia, resides in LTC. Chart indicates she is ambulatory with a walker, pt not able to provide reliable PLOF. Pt completed all bed mobility with sba, tolerated sitting EOB sba. Pt reports LLE pain from knee to foot and describes as \"on fire.\" Due to LLE pain, pt declined standing or transfer attempts. Pt may benefit from ongoing skilled PT services while inpatient to address deficits. Anticipate return to LTC.    Anticipated Discharge Disposition (PT): extended care facility                        "

## 2024-10-22 NOTE — PROGRESS NOTES
DAILY PROGRESS NOTE  [facility], [facility]    10/22/2024    Patient Identification:  Name: Kecia Martinez  Age: 84 y.o.  Sex: female  :  1940  MRN: [id number]           Primary Care Physician: Ministerio Armas MD    Subjective:    Interval History:    No new complaints, feeling tired.  Has bilateral pleural effusion right greater than left.  On diuretics.  No chest pain or shortness of breath.  No nausea, vomiting, diarrhea, constipation   Vital signs stable, saturation in room air is 98%  Appreciate all consultant recommendation  2D echo results reviewed: EF 52.1%.  Blood sugar in the low 200s this morning.  Medications adjusted yesterday.    ROS:     No nausea, vomiting, diarrhea, constipation today.  No chest pain.  No shortness of breath.    Objective:    Scheduled Meds:  aspirin, 81 mg, Oral, Daily  atorvastatin, 10 mg, Oral, Daily  cefTRIAXone, 1,000 mg, Intravenous, Q24H  clopidogrel, 75 mg, Oral, Daily  donepezil, 10 mg, Oral, Nightly  empagliflozin, 10 mg, Oral, Daily  enoxaparin, 40 mg, Subcutaneous, Q24H  furosemide, 40 mg, Oral, BID  insulin regular, 2-7 Units, Subcutaneous, Q6H  ipratropium-albuterol, 3 mL, Nebulization, Q6H - RT  memantine, 10 mg, Oral, BID  metFORMIN ER, 500 mg, Oral, BID With Meals  metoprolol succinate XL, 50 mg, Oral, BID  pantoprazole, 40 mg, Oral, Q AM  sacubitril-valsartan, 1 tablet, Oral, Q12H  sodium chloride, 1 g, Oral, BID        Continuous Infusions:       PRN Meds:    acetaminophen    dextrose    dextrose    glucagon (human recombinant)    sodium chloride    [COMPLETED] Insert Peripheral IV **AND** sodium chloride    Intake/Output:    Intake/Output Summary (Last 24 hours) at 10/22/2024 1112  Last data filed at 10/22/2024 0912  Gross per 24 hour   Intake 360 ml   Output 2850 ml   Net -2490 ml         Exam:    T MAX 24 hrs: Temp (24hrs), Av.1 °F (36.7 °C), Min:97.9 °F (36.6 °C), Max:98.3 °F (36.8 °C)    Vitals Ranges:   Temp:  " [97.9 °F (36.6 °C)-98.3 °F (36.8 °C)] 98 °F (36.7 °C)  Heart Rate:  [82-96] 94  Resp:  [16-20] 16  BP: (126-133)/(50-77) 133/77    /77 (BP Location: Right arm, Patient Position: Lying)   Pulse 94   Temp 98 °F (36.7 °C) (Oral)   Resp 16   Ht 160 cm (63\")   Wt 55.8 kg (123 lb)   SpO2 98%   BMI 21.79 kg/m²     General: Alert, oriented x 1-2. Cooperative, no distress this morning, appears stated age  Neck: Supple, symmetrical, trachea midline, no adenopathy;              thyroid:  no enlargement/tenderness/nodules;              no carotid bruit or JVD  Cardiovascular: Tachycardic, regular rhythm and intact distal pulses.              Exam reveals no gallop and no friction rub. No murmur heard  Pulmonary: Decreased breath sounds bilaterally.  Few rhonchi at bases.  No wheezing, no rales.    Abdominal: Soft, nontender, bowel sounds active all four quadrants,     no masses, no hepatomegaly, no splenomegaly.   Extremities: Normal, atraumatic, no cyanosis or edema  Pulses: 2 + symmetric all extremities  Neurological: Patient is alert and oriented to person.  No new focal sensorimotor deficit.  Skin: Skin color, texture, normal. Turgor is decreased. No rashes or lesions        Data Review:    Results from last 7 days   Lab Units 10/21/24  0505 10/20/24  0434 10/19/24  0548   WBC 10*3/mm3 9.24 15.09* 11.34*   HEMOGLOBIN g/dL 11.1* 10.4* 9.8*   HEMATOCRIT % 34.0 31.7* 28.8*   PLATELETS 10*3/mm3 175 170 160       Results from last 7 days   Lab Units 10/21/24  0505 10/20/24  0434 10/19/24  0548 10/18/24  1711   SODIUM mmol/L 138 140 138 136   POTASSIUM mmol/L 3.5 3.6 4.2 5.0   CHLORIDE mmol/L 102 102 101 101   CO2 mmol/L 30.0* 27.0 25.4 19.4*   BUN mg/dL 16 25* 25* 24*   CREATININE mg/dL 0.77 1.07* 1.11* 1.02*   CALCIUM mg/dL 8.6 8.6 8.7 9.7   BILIRUBIN mg/dL 0.3  --   --  0.4   ALK PHOS U/L 75  --   --  85   ALT (SGPT) U/L 11  --   --  12   AST (SGOT) U/L 26  --   --  91*   GLUCOSE mg/dL 175* 109* 187* 359*       "           Lab Results   Lab Value Date/Time    TROPONINT 3,974 (C) 10/19/2024 0548    TROPONINT 2,113 (C) 10/18/2024 2131    TROPONINT 1,522 (C) 10/18/2024 1711    TROPONINT 25 (H) 11/02/2023 1210    TROPONINT 24 (H) 11/02/2023 1011       Microbiology Results (last 10 days)       Procedure Component Value - Date/Time    Blood Culture - Blood, Arm, Left [[id number]]  (Normal) Collected: 10/18/24 2131    Lab Status: Preliminary result Specimen: Blood from Arm, Left Updated: 10/21/24 2146     Blood Culture No growth at 3 days    Blood Culture - Blood, Arm, Right [[id number]]  (Normal) Collected: 10/18/24 2131    Lab Status: Preliminary result Specimen: Blood from Arm, Right Updated: 10/21/24 2146     Blood Culture No growth at 3 days    Respiratory Panel PCR w/COVID-19(SARS-CoV-2) DAR/OUMAR/ANDREW/PAD/COR/ANUPAM In-House, NP Swab in UTM/VTM, 2 HR TAT - Swab, Nasopharynx [[id number]]  (Normal) Collected: 10/18/24 1711    Lab Status: Final result Specimen: Swab from Nasopharynx Updated: 10/18/24 1819     ADENOVIRUS, PCR Not Detected     Coronavirus 229E Not Detected     Coronavirus HKU1 Not Detected     Coronavirus NL63 Not Detected     Coronavirus OC43 Not Detected     COVID19 Not Detected     Human Metapneumovirus Not Detected     Human Rhinovirus/Enterovirus Not Detected     Influenza A PCR Not Detected     Influenza B PCR Not Detected     Parainfluenza Virus 1 Not Detected     Parainfluenza Virus 2 Not Detected     Parainfluenza Virus 3 Not Detected     Parainfluenza Virus 4 Not Detected     RSV, PCR Not Detected     Bordetella pertussis pcr Not Detected     Bordetella parapertussis PCR Not Detected     Chlamydophila pneumoniae PCR Not Detected     Mycoplasma pneumo by PCR Not Detected    Narrative:      In the setting of a positive respiratory panel with a viral infection PLUS a negative procalcitonin without other underlying concern for bacterial infection, consider observing off antibiotics or discontinuation of antibiotics  and continue supportive care. If the respiratory panel is positive for atypical bacterial infection (Bordetella pertussis, Chlamydophila pneumoniae, or Mycoplasma pneumoniae), consider antibiotic de-escalation to target atypical bacterial infection.             Imaging Results (Last 7 Days)       Procedure Component Value Units Date/Time    CT Chest Without Contrast Diagnostic [[id number]] Collected: 10/20/24 1621     Updated: 10/20/24 1628    Narrative:      CT CHEST WO CONTRAST DIAGNOSTIC-     Radiation dose reduction techniques were utilized, including automated  exposure control and exposure modulation based on body size.           FINDINGS:  1. The esophagus is satisfactory in course and caliber. Heart size  within normal limits. Heavy coronary artery calcification.  Atherosclerotic calcification of a normal diameter aorta.     2. Moderate-sized bilateral pleural effusions, free-flowing, size on the  right greater than the left. Consolidation/atelectasis at the base of  both lower lobes, likewise greater on the right than the left. Patchy  areas of groundglass infiltrate demonstrated within the right and left  lower lobes as well as the right upper lobe.     3. 6 mm noncalcified nodular density right upper lobe on image #36.  Adjacent similar 3 mm nodule seen on image 32. This could be  infectious/inflammatory or neoplastic. Short-term follow-up recommended  in 6 months.     4. There is some fullness within the right hilum, cannot exclude mildly  enlarged lymph nodes. There is an enlarged precarinal lymph node within  the mediastinum measuring 11 mm.     5. Limited images through the upper abdomen are unremarkable. No acute  osseous abnormality. Remainder is unremarkable.              This report was finalized on 10/20/2024 4:25 PM by Dr. Adiel Mason M.D on Workstation: [id number]       XR Chest 1 View [[id number]] Collected: 10/19/24 1550     Updated: 10/19/24 0285    Narrative:      XR CHEST 1 VW-      HISTORY: Female who is 84 years-old, short of breath     TECHNIQUE: Frontal views of the chest     COMPARISON: 10/18/2024     FINDINGS: The heart size is normal. Aorta is calcified. Pulmonary  vasculature is slightly congested. Increased opacity at the right mid to  lower lung suggests increased atelectasis/infiltrate/pleural effusion.  Persistent left basilar atelectasis/infiltrate/effusion. No  pneumothorax. No acute osseous process.       Impression:      Persistent right more than left opacities, mildly increased  on the right.     This report was finalized on 10/19/2024 3:51 PM by Dr. Javed Terrell M.D on Workstation: [email or web address]       XR Chest 1 View [[id number]] Collected: 10/18/24 1746     Updated: 10/18/24 1750    Narrative:      XR CHEST 1 VW-     Clinical: Shortness of breath     FINDINGS: Cardiac size within normal limits, there is atherosclerotic  calcification of the aorta. There is vascular congestion with vague  opacity at both lung bases, greater on the right than the left, likely  associated pleural effusion effusions. There is bibasilar atelectasis  and/or infiltrate, pneumonia not excluded. Remainder is unremarkable.     This report was finalized on 10/18/2024 5:47 PM by Dr. Adiel Mason M.D on Workstation: [id number]                 Assessment:        Acute hypoxemic respiratory failure  Non-ST elevation MI  Pulmonary edema  Leukocytosis, rule out infection, cannot rule out pneumonia.  Hypertension  Hyperlipidemia  Coronary artery disease, history MI  Peripheral vascular disease  Diabetes mellitus  Alzheimer's dementia        Plan:    Stable.  However, weak.  Will change antibiotics to p.o.  Continue diuretics for pulmonary edema, recent MI, pleural effusions.  Still with considerably pleural effusions.    Continue treatment for non-ST elevation MI, on medical therapy at this time.   2D echo with preserved ejection fraction, but hypokinesis of basal inferior and inferolateral  walls.  Continue aspirin, Plavix, statin, metoprolol, Entresto.  Monitor volume status, while on Lasix.    Monitor and correct electrolytes, stable at this time.  Will check BMP today.  Accu-Chek and SSI, blood sugar in the mid  200s.   to be started on Jardiance and metformin today.  Monitor mental status, back to baseline at this time.    DVT prophylaxis: Lovenox dose for prophylaxis.    Stress ulcer prophylaxis: Pepcid.  Labs in am      Ministerio Armas MD  10/22/2024  11:12 EDT

## 2024-10-22 NOTE — PLAN OF CARE
Goal Outcome Evaluation:     Pt resting in bed. No c/o voiced. VSS Plan of care ongoing

## 2024-10-23 ENCOUNTER — APPOINTMENT (OUTPATIENT)
Dept: GENERAL RADIOLOGY | Facility: HOSPITAL | Age: 84
End: 2024-10-23
Payer: MEDICARE

## 2024-10-23 LAB
ANION GAP SERPL CALCULATED.3IONS-SCNC: 8 MMOL/L (ref 5–15)
BACTERIA SPEC AEROBE CULT: NORMAL
BACTERIA SPEC AEROBE CULT: NORMAL
BASOPHILS # BLD AUTO: 0.06 10*3/MM3 (ref 0–0.2)
BASOPHILS NFR BLD AUTO: 0.5 % (ref 0–1.5)
BUN SERPL-MCNC: 21 MG/DL (ref 8–23)
BUN/CREAT SERPL: 24.1 (ref 7–25)
CALCIUM SPEC-SCNC: 8.6 MG/DL (ref 8.6–10.5)
CHLORIDE SERPL-SCNC: 96 MMOL/L (ref 98–107)
CO2 SERPL-SCNC: 30 MMOL/L (ref 22–29)
CREAT SERPL-MCNC: 0.87 MG/DL (ref 0.57–1)
DEPRECATED RDW RBC AUTO: 40.7 FL (ref 37–54)
EGFRCR SERPLBLD CKD-EPI 2021: 66.6 ML/MIN/1.73
EOSINOPHIL # BLD AUTO: 0.19 10*3/MM3 (ref 0–0.4)
EOSINOPHIL NFR BLD AUTO: 1.7 % (ref 0.3–6.2)
ERYTHROCYTE [DISTWIDTH] IN BLOOD BY AUTOMATED COUNT: 12.4 % (ref 12.3–15.4)
GLUCOSE BLDC GLUCOMTR-MCNC: 139 MG/DL (ref 70–130)
GLUCOSE BLDC GLUCOMTR-MCNC: 174 MG/DL (ref 70–130)
GLUCOSE BLDC GLUCOMTR-MCNC: 188 MG/DL (ref 70–130)
GLUCOSE BLDC GLUCOMTR-MCNC: 198 MG/DL (ref 70–130)
GLUCOSE SERPL-MCNC: 176 MG/DL (ref 65–99)
HCT VFR BLD AUTO: 36.5 % (ref 34–46.6)
HGB BLD-MCNC: 12 G/DL (ref 12–15.9)
IMM GRANULOCYTES # BLD AUTO: 0.06 10*3/MM3 (ref 0–0.05)
IMM GRANULOCYTES NFR BLD AUTO: 0.5 % (ref 0–0.5)
LYMPHOCYTES # BLD AUTO: 1.63 10*3/MM3 (ref 0.7–3.1)
LYMPHOCYTES NFR BLD AUTO: 14.2 % (ref 19.6–45.3)
MCH RBC QN AUTO: 29.6 PG (ref 26.6–33)
MCHC RBC AUTO-ENTMCNC: 32.9 G/DL (ref 31.5–35.7)
MCV RBC AUTO: 90.1 FL (ref 79–97)
MONOCYTES # BLD AUTO: 1.1 10*3/MM3 (ref 0.1–0.9)
MONOCYTES NFR BLD AUTO: 9.6 % (ref 5–12)
NEUTROPHILS NFR BLD AUTO: 73.5 % (ref 42.7–76)
NEUTROPHILS NFR BLD AUTO: 8.42 10*3/MM3 (ref 1.7–7)
NRBC BLD AUTO-RTO: 0 /100 WBC (ref 0–0.2)
PLATELET # BLD AUTO: 202 10*3/MM3 (ref 140–450)
PMV BLD AUTO: 12 FL (ref 6–12)
POTASSIUM SERPL-SCNC: 3.6 MMOL/L (ref 3.5–5.2)
RBC # BLD AUTO: 4.05 10*6/MM3 (ref 3.77–5.28)
SODIUM SERPL-SCNC: 134 MMOL/L (ref 136–145)
WBC NRBC COR # BLD AUTO: 11.46 10*3/MM3 (ref 3.4–10.8)

## 2024-10-23 PROCEDURE — 94761 N-INVAS EAR/PLS OXIMETRY MLT: CPT

## 2024-10-23 PROCEDURE — 73560 X-RAY EXAM OF KNEE 1 OR 2: CPT

## 2024-10-23 PROCEDURE — 25010000002 ENOXAPARIN PER 10 MG: Performed by: INTERNAL MEDICINE

## 2024-10-23 PROCEDURE — 94799 UNLISTED PULMONARY SVC/PX: CPT

## 2024-10-23 PROCEDURE — 80048 BASIC METABOLIC PNL TOTAL CA: CPT | Performed by: INTERNAL MEDICINE

## 2024-10-23 PROCEDURE — 73502 X-RAY EXAM HIP UNI 2-3 VIEWS: CPT

## 2024-10-23 PROCEDURE — 85025 COMPLETE CBC W/AUTO DIFF WBC: CPT | Performed by: INTERNAL MEDICINE

## 2024-10-23 PROCEDURE — 82948 REAGENT STRIP/BLOOD GLUCOSE: CPT

## 2024-10-23 PROCEDURE — 63710000001 INSULIN REGULAR HUMAN PER 5 UNITS: Performed by: INTERNAL MEDICINE

## 2024-10-23 RX ADMIN — SODIUM CHLORIDE TAB 1 GM 1 G: 1 TAB at 08:49

## 2024-10-23 RX ADMIN — CLOPIDOGREL BISULFATE 75 MG: 75 TABLET, FILM COATED ORAL at 08:49

## 2024-10-23 RX ADMIN — MEMANTINE HYDROCHLORIDE 10 MG: 10 TABLET, FILM COATED ORAL at 08:49

## 2024-10-23 RX ADMIN — FUROSEMIDE 40 MG: 40 TABLET ORAL at 08:49

## 2024-10-23 RX ADMIN — INSULIN HUMAN 2 UNITS: 100 INJECTION, SOLUTION PARENTERAL at 06:48

## 2024-10-23 RX ADMIN — ASPIRIN 81 MG: 81 TABLET, CHEWABLE ORAL at 08:49

## 2024-10-23 RX ADMIN — IPRATROPIUM BROMIDE AND ALBUTEROL SULFATE 3 ML: 2.5; .5 SOLUTION RESPIRATORY (INHALATION) at 07:07

## 2024-10-23 RX ADMIN — ACETAMINOPHEN 325MG 650 MG: 325 TABLET ORAL at 12:14

## 2024-10-23 RX ADMIN — METOPROLOL SUCCINATE 50 MG: 50 TABLET, FILM COATED, EXTENDED RELEASE ORAL at 20:13

## 2024-10-23 RX ADMIN — SACUBITRIL AND VALSARTAN 1 TABLET: 24; 26 TABLET, FILM COATED ORAL at 20:13

## 2024-10-23 RX ADMIN — SACUBITRIL AND VALSARTAN 1 TABLET: 24; 26 TABLET, FILM COATED ORAL at 08:49

## 2024-10-23 RX ADMIN — ATORVASTATIN CALCIUM 10 MG: 20 TABLET, FILM COATED ORAL at 08:49

## 2024-10-23 RX ADMIN — IPRATROPIUM BROMIDE AND ALBUTEROL SULFATE 3 ML: 2.5; .5 SOLUTION RESPIRATORY (INHALATION) at 14:39

## 2024-10-23 RX ADMIN — CEFDINIR 300 MG: 300 CAPSULE ORAL at 08:49

## 2024-10-23 RX ADMIN — DONEPEZIL HYDROCHLORIDE 10 MG: 10 TABLET, FILM COATED ORAL at 20:12

## 2024-10-23 RX ADMIN — MEMANTINE HYDROCHLORIDE 10 MG: 10 TABLET, FILM COATED ORAL at 20:12

## 2024-10-23 RX ADMIN — FUROSEMIDE 40 MG: 40 TABLET ORAL at 17:58

## 2024-10-23 RX ADMIN — INSULIN HUMAN 2 UNITS: 100 INJECTION, SOLUTION PARENTERAL at 17:58

## 2024-10-23 RX ADMIN — INSULIN HUMAN 2 UNITS: 100 INJECTION, SOLUTION PARENTERAL at 12:08

## 2024-10-23 RX ADMIN — EMPAGLIFLOZIN 10 MG: 10 TABLET, FILM COATED ORAL at 08:50

## 2024-10-23 RX ADMIN — IPRATROPIUM BROMIDE AND ALBUTEROL SULFATE 3 ML: 2.5; .5 SOLUTION RESPIRATORY (INHALATION) at 20:45

## 2024-10-23 RX ADMIN — Medication 10 ML: at 08:49

## 2024-10-23 RX ADMIN — ACETAMINOPHEN 325MG 650 MG: 325 TABLET ORAL at 20:22

## 2024-10-23 RX ADMIN — METFORMIN HYDROCHLORIDE 500 MG: 500 TABLET, EXTENDED RELEASE ORAL at 17:58

## 2024-10-23 RX ADMIN — ENOXAPARIN SODIUM 40 MG: 100 INJECTION SUBCUTANEOUS at 14:41

## 2024-10-23 RX ADMIN — METFORMIN HYDROCHLORIDE 500 MG: 500 TABLET, EXTENDED RELEASE ORAL at 08:50

## 2024-10-23 RX ADMIN — METOPROLOL SUCCINATE 50 MG: 50 TABLET, FILM COATED, EXTENDED RELEASE ORAL at 08:49

## 2024-10-23 RX ADMIN — CEFDINIR 300 MG: 300 CAPSULE ORAL at 20:12

## 2024-10-23 RX ADMIN — SODIUM CHLORIDE TAB 1 GM 1 G: 1 TAB at 20:12

## 2024-10-23 RX ADMIN — PANTOPRAZOLE SODIUM 40 MG: 40 TABLET, DELAYED RELEASE ORAL at 06:48

## 2024-10-23 NOTE — CASE MANAGEMENT/SOCIAL WORK
Continued Stay Note  [facility]     Patient Name: Kecia Martinez  MRN: [id number]  Today's Date: 10/23/2024    Admit Date: 10/18/2024    Plan: Return to [facility]   Discharge Plan       Row Name 10/23/24 1127       Plan    Plan Comments CCP received call from patient's state guardian, Dorys Johnson. She states the daughter is wanting patient closer. State guardian requested referrals to [facility], [facility] and [facility]. Per guardian; if facilities are not able to accept-plan is for patient to return to [facility]. CCP spoke with Darlene/[facility]; no long term beds available. CCP spoke with [facility]; no LTC bed available. [facility] will evaluate. Oksana THOMSON                   Discharge Codes    No documentation.                 Expected Discharge Date and Time       Expected Discharge Date Expected Discharge Time    Oct 23, 2024               ALIX Spears

## 2024-10-23 NOTE — DISCHARGE PLACEMENT REQUEST
"Mario Mazariegos (84 y.o. Female)       Date of Birth   1940    Social Security Number       Address   [address] [place] [address]    Home Phone   [phone]    MRN   [id number]       Yarsani   None    Marital Status                               Admission Date   10/18/24    Admission Type   Emergency    Admitting Provider   Ministerio Armas MD    Attending Provider   Ministerio Armas MD    Department, Room/Bed   [facility], 3111/1       Discharge Date       Discharge Disposition       Discharge Destination                                 Attending Provider: Ministerio Armas MD    Allergies: Penicillins, Sulfa Antibiotics, Sulfanilamide    Isolation: Contact   Infection: Candida Auris (rule out) (10/21/24)   Code Status: CPR    Ht: 160 cm (63\")   Wt: 55.8 kg (123 lb)    Admission Cmt: None   Principal Problem: Acute hypoxemic respiratory failure [J96.01]                   Active Insurance as of 10/18/2024       Primary Coverage       Payor Plan Insurance Group Employer/Plan Group    MEDICARE MEDICARE A & B        Payor Plan Address Payor Plan Phone Number Payor Plan Fax Number Effective Dates    [address] [phone]  8/1/2007 - None Entered    [place] [address]         Subscriber Name Subscriber Birth Date Member ID       MARIO MAZARIEGOS 1940 [id number]                     Emergency Contacts        (Rel.) Home Phone Work Phone Mobile Phone    DANIELLA ANDRES (Other) -- -- [phone]    Afterhours,Guardianship (Legal Guardian) -- [phone] --    LILIANA CASTILLO (Daughter) [phone] -- [phone]    Pietro Mazariegos [phone] -- --            "

## 2024-10-23 NOTE — PLAN OF CARE
Goal Outcome Evaluation:                            Patient alert with confusion and c/o bilateral leg pain- mediated per MAR. X-rays ordered per Dr alexander. Daughter states that this is a new complaint since she saw her on Wednesday. Daughter at bedside most of the day. Daughter states she would like for her to go to [facility] or [facility] on discharge. No acute distress noted, will continue to monitor.

## 2024-10-23 NOTE — PROGRESS NOTES
DAILY PROGRESS NOTE  [facility], [facility]    10/23/2024    Patient Identification:  Name: Kecia Martinez  Age: 84 y.o.  Sex: female  :  1940  MRN: [id number]           Primary Care Physician: Ministerio Armas MD    Subjective:    Interval History:    Patient is complaining today of left hip pain and left knee pain.  Was not able to participate much with physical therapy yesterday due to this pain.  Has bilateral pleural effusion right greater than left.  On diuretics.  No chest pain or shortness of breath.  No nausea, vomiting, diarrhea, constipation   Vital signs stable, saturation in room air is 97%  2D echo results reviewed: EF 52.1%.  Blood sugar in the mid 100s.      ROS:     No nausea, vomiting, diarrhea, constipation today.  No chest pain.  No shortness of breath.    Objective:    Scheduled Meds:  aspirin, 81 mg, Oral, Daily  atorvastatin, 10 mg, Oral, Daily  cefdinir, 300 mg, Oral, Q12H  clopidogrel, 75 mg, Oral, Daily  donepezil, 10 mg, Oral, Nightly  empagliflozin, 10 mg, Oral, Daily  enoxaparin, 40 mg, Subcutaneous, Q24H  furosemide, 40 mg, Oral, BID  insulin regular, 2-7 Units, Subcutaneous, Q6H  ipratropium-albuterol, 3 mL, Nebulization, Q6H - RT  memantine, 10 mg, Oral, BID  metFORMIN ER, 500 mg, Oral, BID With Meals  metoprolol succinate XL, 50 mg, Oral, BID  pantoprazole, 40 mg, Oral, Q AM  sacubitril-valsartan, 1 tablet, Oral, Q12H  sodium chloride, 1 g, Oral, BID        Continuous Infusions:       PRN Meds:    acetaminophen    dextrose    dextrose    glucagon (human recombinant)    sodium chloride    [COMPLETED] Insert Peripheral IV **AND** sodium chloride    Intake/Output:    Intake/Output Summary (Last 24 hours) at 10/23/2024 1326  Last data filed at 10/23/2024 0847  Gross per 24 hour   Intake 800 ml   Output 1400 ml   Net -600 ml         Exam:    T MAX 24 hrs: Temp (24hrs), Av.3 °F (36.8 °C), Min:97.9 °F (36.6 °C), Max:98.7 °F (37.1  "°C)    Vitals Ranges:   Temp:  [97.9 °F (36.6 °C)-98.7 °F (37.1 °C)] 98.7 °F (37.1 °C)  Heart Rate:  [] 87  Resp:  [16-18] 18  BP: (113-134)/(53-70) 113/53    /53 (BP Location: Right arm, Patient Position: Lying)   Pulse 87   Temp 98.7 °F (37.1 °C) (Oral)   Resp 18   Ht 160 cm (63\")   Wt 55.8 kg (123 lb)   SpO2 97%   BMI 21.79 kg/m²     General: Alert, oriented x 1-2. Cooperative, no distress this morning, appears stated age  Neck: Supple, symmetrical, trachea midline, no adenopathy;              thyroid:  no enlargement/tenderness/nodules;              no carotid bruit or JVD  Cardiovascular: Tachycardic, regular rhythm and intact distal pulses.              Exam reveals no gallop and no friction rub. No murmur heard  Pulmonary: Decreased breath sounds bilaterally.  Few rhonchi at bases.  No wheezing, no rales.    Abdominal: Soft, nontender, bowel sounds active all four quadrants,     no masses, no hepatomegaly, no splenomegaly.   Extremities: Pain in the left hip and it trochanteric area as well as in the right knee, right about the patella.    Pulses: 2 + symmetric all extremities  Neurological: Patient is alert and oriented to person.  No new focal sensorimotor deficit.  Skin: Skin color, texture, normal. Turgor is decreased. No rashes or lesions        Data Review:    Results from last 7 days   Lab Units 10/23/24  0548 10/21/24  0505 10/20/24  0434   WBC 10*3/mm3 11.46* 9.24 15.09*   HEMOGLOBIN g/dL 12.0 11.1* 10.4*   HEMATOCRIT % 36.5 34.0 31.7*   PLATELETS 10*3/mm3 202 175 170       Results from last 7 days   Lab Units 10/23/24  0548 10/22/24  1358 10/21/24  0505 10/19/24  0548 10/18/24  1711   SODIUM mmol/L 134* 137 138   < > 136   POTASSIUM mmol/L 3.6 3.8 3.5   < > 5.0   CHLORIDE mmol/L 96* 97* 102   < > 101   CO2 mmol/L 30.0* 28.1 30.0*   < > 19.4*   BUN mg/dL 21 21 16   < > 24*   CREATININE mg/dL 0.87 1.02* 0.77   < > 1.02*   CALCIUM mg/dL 8.6 8.4* 8.6   < > 9.7   BILIRUBIN mg/dL  --   " --  0.3  --  0.4   ALK PHOS U/L  --   --  75  --  85   ALT (SGPT) U/L  --   --  11  --  12   AST (SGOT) U/L  --   --  26  --  91*   GLUCOSE mg/dL 176* 187* 175*   < > 359*    < > = values in this interval not displayed.                 Lab Results   Lab Value Date/Time    TROPONINT 3,974 (C) 10/19/2024 0548    TROPONINT 2,113 (C) 10/18/2024 2131    TROPONINT 1,522 (C) 10/18/2024 1711    TROPONINT 25 (H) 11/02/2023 1210    TROPONINT 24 (H) 11/02/2023 1011       Microbiology Results (last 10 days)       Procedure Component Value - Date/Time    CANDIDA AURIS SCREEN - Swab, Axilla Right, Axilla Left and Groin [[id number]]  (Normal) Collected: 10/21/24 1205    Lab Status: Preliminary result Specimen: Swab from Axilla Right, Axilla Left and Groin Updated: 10/23/24 1150     Candida Auris Screen Culture Culture in progress    Blood Culture - Blood, Arm, Left [[id number]]  (Normal) Collected: 10/18/24 2131    Lab Status: Preliminary result Specimen: Blood from Arm, Left Updated: 10/22/24 2146     Blood Culture No growth at 4 days    Blood Culture - Blood, Arm, Right [[id number]]  (Normal) Collected: 10/18/24 2131    Lab Status: Preliminary result Specimen: Blood from Arm, Right Updated: 10/22/24 2146     Blood Culture No growth at 4 days    Respiratory Panel PCR w/COVID-19(SARS-CoV-2) DAR/OUMAR/ANDREW/PAD/COR/ANUPAM In-House, NP Swab in UTM/VTM, 2 HR TAT - Swab, Nasopharynx [[id number]]  (Normal) Collected: 10/18/24 1711    Lab Status: Final result Specimen: Swab from Nasopharynx Updated: 10/18/24 1819     ADENOVIRUS, PCR Not Detected     Coronavirus 229E Not Detected     Coronavirus HKU1 Not Detected     Coronavirus NL63 Not Detected     Coronavirus OC43 Not Detected     COVID19 Not Detected     Human Metapneumovirus Not Detected     Human Rhinovirus/Enterovirus Not Detected     Influenza A PCR Not Detected     Influenza B PCR Not Detected     Parainfluenza Virus 1 Not Detected     Parainfluenza Virus 2 Not Detected     Parainfluenza  Virus 3 Not Detected     Parainfluenza Virus 4 Not Detected     RSV, PCR Not Detected     Bordetella pertussis pcr Not Detected     Bordetella parapertussis PCR Not Detected     Chlamydophila pneumoniae PCR Not Detected     Mycoplasma pneumo by PCR Not Detected    Narrative:      In the setting of a positive respiratory panel with a viral infection PLUS a negative procalcitonin without other underlying concern for bacterial infection, consider observing off antibiotics or discontinuation of antibiotics and continue supportive care. If the respiratory panel is positive for atypical bacterial infection (Bordetella pertussis, Chlamydophila pneumoniae, or Mycoplasma pneumoniae), consider antibiotic de-escalation to target atypical bacterial infection.             Imaging Results (Last 7 Days)       Procedure Component Value Units Date/Time    CT Chest Without Contrast Diagnostic [[id number]] Collected: 10/20/24 1621     Updated: 10/20/24 1628    Narrative:      CT CHEST WO CONTRAST DIAGNOSTIC-     Radiation dose reduction techniques were utilized, including automated  exposure control and exposure modulation based on body size.           FINDINGS:  1. The esophagus is satisfactory in course and caliber. Heart size  within normal limits. Heavy coronary artery calcification.  Atherosclerotic calcification of a normal diameter aorta.     2. Moderate-sized bilateral pleural effusions, free-flowing, size on the  right greater than the left. Consolidation/atelectasis at the base of  both lower lobes, likewise greater on the right than the left. Patchy  areas of groundglass infiltrate demonstrated within the right and left  lower lobes as well as the right upper lobe.     3. 6 mm noncalcified nodular density right upper lobe on image #36.  Adjacent similar 3 mm nodule seen on image 32. This could be  infectious/inflammatory or neoplastic. Short-term follow-up recommended  in 6 months.     4. There is some fullness within the  right hilum, cannot exclude mildly  enlarged lymph nodes. There is an enlarged precarinal lymph node within  the mediastinum measuring 11 mm.     5. Limited images through the upper abdomen are unremarkable. No acute  osseous abnormality. Remainder is unremarkable.              This report was finalized on 10/20/2024 4:25 PM by Dr. Adiel Mason M.D on Workstation: [id number]       XR Chest 1 View [[id number]] Collected: 10/19/24 1550     Updated: 10/19/24 1555    Narrative:      XR CHEST 1 VW-     HISTORY: Female who is 84 years-old, short of breath     TECHNIQUE: Frontal views of the chest     COMPARISON: 10/18/2024     FINDINGS: The heart size is normal. Aorta is calcified. Pulmonary  vasculature is slightly congested. Increased opacity at the right mid to  lower lung suggests increased atelectasis/infiltrate/pleural effusion.  Persistent left basilar atelectasis/infiltrate/effusion. No  pneumothorax. No acute osseous process.       Impression:      Persistent right more than left opacities, mildly increased  on the right.     This report was finalized on 10/19/2024 3:51 PM by Dr. Javed Terrell M.D on Workstation: [id number]       XR Chest 1 View [[id number]] Collected: 10/18/24 1746     Updated: 10/18/24 1750    Narrative:      XR CHEST 1 VW-     Clinical: Shortness of breath     FINDINGS: Cardiac size within normal limits, there is atherosclerotic  calcification of the aorta. There is vascular congestion with vague  opacity at both lung bases, greater on the right than the left, likely  associated pleural effusion effusions. There is bibasilar atelectasis  and/or infiltrate, pneumonia not excluded. Remainder is unremarkable.     This report was finalized on 10/18/2024 5:47 PM by Dr. Adiel Mason M.D on Workstation: [id number]                 Assessment:        Acute hypoxemic respiratory failure  Non-ST elevation MI  Pulmonary edema  Leukocytosis, rule out infection, cannot rule out  pneumonia.  Hypertension  Hyperlipidemia  Coronary artery disease, history MI  Peripheral vascular disease  Diabetes mellitus  Alzheimer's dementia  Left hip pain/left knee pain.    Plan:    Patient was complaining of left hip pain and left knee pain that did not allow her to work physical therapy yesterday.  Will  check x-ray.  On p.o. antibiotics, to complete course.  Continue diuretics for pulmonary edema, recent MI, pleural effusions.  Still with considerably pleural effusions.    Continue treatment for non-ST elevation MI, on medical therapy at this time.   2D echo with preserved ejection fraction, but hypokinesis of basal inferior and inferolateral walls.  Continue aspirin, Plavix, statin, metoprolol, Entresto.  Monitor volume status, while on Lasix.    Monitor and correct electrolytes, stable at this time.    Accu-Chek and SSI, blood sugar in the mid  200s.   to be started on Jardiance and metformin today.  Monitor mental status, back to baseline at this time.    DVT prophylaxis: Lovenox dose for prophylaxis.    Stress ulcer prophylaxis: Pepcid.  Probably DC tomorrow.  Labs in am      Ministerio Armas MD  10/23/2024  13:26 EDT

## 2024-10-23 NOTE — CASE MANAGEMENT/SOCIAL WORK
Continued Stay Note  [facility]     Patient Name: Kecia Martinez  MRN: [id number]  Today's Date: 10/23/2024    Admit Date: 10/18/2024    Plan: Pending   Discharge Plan       Row Name 10/23/24 1517       Plan    Plan Pending    Plan Comments CCP received denials for all additional referrals placed for LTC. CCP LVM for patient's state guardian, Dorys Ortizjulissa to update and to confirm d/c plan to [facility] and rehab. PDC tomorrow. Oksana THOMSON                   Discharge Codes    No documentation.                 Expected Discharge Date and Time       Expected Discharge Date Expected Discharge Time    Oct 23, 2024               ALIX Spears

## 2024-10-23 NOTE — PLAN OF CARE
Goal Outcome Evaluation:   Pt resting on the bed, AO x self, RA, sr on monitor, administered meds per mar, no ss of acute distress noted, will continue to monitor.        Problem: Adult Inpatient Plan of Care  Goal: Absence of Hospital-Acquired Illness or Injury  Intervention: Identify and Manage Fall Risk  Recent Flowsheet Documentation  Taken 10/23/2024 0608 by Olivier Trejo RN  Safety Promotion/Fall Prevention:   activity supervised   room organization consistent   safety round/check completed  Taken 10/23/2024 0408 by Olivier Trejo RN  Safety Promotion/Fall Prevention:   activity supervised   room organization consistent   safety round/check completed  Taken 10/23/2024 0201 by Olivier Trejo RN  Safety Promotion/Fall Prevention:   activity supervised   room organization consistent   safety round/check completed  Taken 10/23/2024 0010 by Olivier Trejo RN  Safety Promotion/Fall Prevention:   activity supervised   room organization consistent   safety round/check completed  Taken 10/22/2024 2208 by Olivier Trejo RN  Safety Promotion/Fall Prevention:   activity supervised   safety round/check completed   room organization consistent  Taken 10/22/2024 2022 by Olivier Trejo RN  Safety Promotion/Fall Prevention:   activity supervised   room organization consistent   safety round/check completed  Intervention: Prevent Skin Injury  Recent Flowsheet Documentation  Taken 10/23/2024 0608 by Olivier Trejo RN  Body Position: position changed independently  Taken 10/23/2024 0408 by Olivier Trejo RN  Body Position: position changed independently  Taken 10/23/2024 0201 by Olivier Trejo RN  Body Position: position changed independently  Taken 10/23/2024 0010 by Olivier Trejo RN  Body Position: position changed independently  Taken 10/22/2024 2208 by Olivier Trejo RN  Body Position: position changed independently  Taken 10/22/2024 2022 by Olivier Trejo RN  Body Position:  position changed independently  Intervention: Prevent and Manage VTE (Venous Thromboembolism) Risk  Recent Flowsheet Documentation  Taken 10/23/2024 0010 by Olivier Trejo RN  VTE Prevention/Management: (on Lovenox) other (see comments)  Taken 10/22/2024 2022 by Olivier Trejo RN  VTE Prevention/Management: (on Lovenox) other (see comments)  Intervention: Prevent Infection  Recent Flowsheet Documentation  Taken 10/23/2024 0608 by Olivier Trejo RN  Infection Prevention:   hand hygiene promoted   rest/sleep promoted  Taken 10/23/2024 0408 by Olivier Trejo RN  Infection Prevention:   hand hygiene promoted   rest/sleep promoted  Taken 10/23/2024 0201 by Olivier Trejo RN  Infection Prevention:   hand hygiene promoted   single patient room provided   rest/sleep promoted  Taken 10/23/2024 0010 by Olivier Trejo RN  Infection Prevention:   hand hygiene promoted   rest/sleep promoted  Taken 10/22/2024 2208 by Olivier Trejo RN  Infection Prevention:   hand hygiene promoted   rest/sleep promoted  Taken 10/22/2024 2022 by Olivier Trejo RN  Infection Prevention:   hand hygiene promoted   rest/sleep promoted  Goal: Optimal Comfort and Wellbeing  Intervention: Provide Person-Centered Care  Recent Flowsheet Documentation  Taken 10/23/2024 0010 by Olivier Trejo RN  Trust Relationship/Rapport: care explained  Taken 10/22/2024 2022 by Olivier Trejo RN  Trust Relationship/Rapport:   care explained   choices provided     Problem: Fall Injury Risk  Goal: Absence of Fall and Fall-Related Injury  Intervention: Identify and Manage Contributors  Recent Flowsheet Documentation  Taken 10/23/2024 0608 by Olivier Trejo RN  Medication Review/Management: medications reviewed  Taken 10/23/2024 0408 by Olivier Trejo RN  Medication Review/Management: medications reviewed  Taken 10/23/2024 0201 by Olivier Trejo RN  Medication Review/Management: medications reviewed  Taken 10/23/2024 0010 by  Olivier Trejo RN  Medication Review/Management: medications reviewed  Taken 10/22/2024 2208 by Olivier Trejo RN  Medication Review/Management: medications reviewed  Taken 10/22/2024 2022 by Olivier Trejo RN  Medication Review/Management: medications reviewed  Intervention: Promote Injury-Free Environment  Recent Flowsheet Documentation  Taken 10/23/2024 0608 by Olivier Trejo RN  Safety Promotion/Fall Prevention:   activity supervised   room organization consistent   safety round/check completed  Taken 10/23/2024 0408 by Olivier Trejo RN  Safety Promotion/Fall Prevention:   activity supervised   room organization consistent   safety round/check completed  Taken 10/23/2024 0201 by Olivier Trejo RN  Safety Promotion/Fall Prevention:   activity supervised   room organization consistent   safety round/check completed  Taken 10/23/2024 0010 by Olivier Trejo RN  Safety Promotion/Fall Prevention:   activity supervised   room organization consistent   safety round/check completed  Taken 10/22/2024 2208 by Olivier Trejo RN  Safety Promotion/Fall Prevention:   activity supervised   safety round/check completed   room organization consistent  Taken 10/22/2024 2022 by Olivier Trejo RN  Safety Promotion/Fall Prevention:   activity supervised   room organization consistent   safety round/check completed     Problem: Comorbidity Management  Goal: Blood Glucose Level Within Target Range  Intervention: Monitor and Manage Glycemia  Recent Flowsheet Documentation  Taken 10/23/2024 0608 by Olivier Trejo RN  Medication Review/Management: medications reviewed  Taken 10/23/2024 0408 by Olivier Trejo RN  Medication Review/Management: medications reviewed  Taken 10/23/2024 0201 by Olivier Trejo RN  Medication Review/Management: medications reviewed  Taken 10/23/2024 0010 by Olivier Trejo RN  Medication Review/Management: medications reviewed  Taken 10/22/2024 2208 by Neil  NEERAJ Aguayo  Medication Review/Management: medications reviewed  Taken 10/22/2024 2022 by Olivier Trejo RN  Medication Review/Management: medications reviewed  Goal: Maintenance of Heart Failure Symptom Control  Intervention: Maintain Heart Failure Management  Recent Flowsheet Documentation  Taken 10/23/2024 0608 by Olivier Trejo RN  Medication Review/Management: medications reviewed  Taken 10/23/2024 0408 by Olivier Trejo RN  Medication Review/Management: medications reviewed  Taken 10/23/2024 0201 by Olivier Trejo RN  Medication Review/Management: medications reviewed  Taken 10/23/2024 0010 by Olivier Trejo RN  Medication Review/Management: medications reviewed  Taken 10/22/2024 2208 by Olivier Trejo RN  Medication Review/Management: medications reviewed  Taken 10/22/2024 2022 by Olivier Trejo RN  Medication Review/Management: medications reviewed  Goal: Blood Pressure in Desired Range  Intervention: Maintain Blood Pressure Management  Recent Flowsheet Documentation  Taken 10/23/2024 0608 by Olivier Trejo RN  Medication Review/Management: medications reviewed  Taken 10/23/2024 0408 by Olivier Trejo RN  Medication Review/Management: medications reviewed  Taken 10/23/2024 0201 by Olivier Trejo RN  Medication Review/Management: medications reviewed  Taken 10/23/2024 0010 by Olivier Trejo RN  Medication Review/Management: medications reviewed  Taken 10/22/2024 2208 by Olivier Trejo RN  Medication Review/Management: medications reviewed  Taken 10/22/2024 2022 by Olivier Trejo RN  Medication Review/Management: medications reviewed  Goal: Maintenance of Osteoarthritis Symptom Control  Intervention: Maintain Osteoarthritis Symptom Control  Recent Flowsheet Documentation  Taken 10/23/2024 0608 by Olivier Trejo RN  Medication Review/Management: medications reviewed  Taken 10/23/2024 0408 by Olivier Trejo RN  Medication Review/Management: medications  reviewed  Taken 10/23/2024 0201 by Olivier Trejo RN  Medication Review/Management: medications reviewed  Taken 10/23/2024 0010 by Olivier Trejo RN  Activity Management: bedrest  Medication Review/Management: medications reviewed  Taken 10/22/2024 2208 by Olivier Trejo RN  Medication Review/Management: medications reviewed  Taken 10/22/2024 2022 by Olivier Trejo RN  Activity Management: bedrest  Medication Review/Management: medications reviewed     Problem: Heart Failure  Goal: Optimal Coping  Intervention: Support Psychosocial Response  Recent Flowsheet Documentation  Taken 10/22/2024 2022 by Olivier Trejo RN  Family/Support System Care: support provided  Goal: Optimal Functional Ability  Intervention: Optimize Functional Ability  Recent Flowsheet Documentation  Taken 10/23/2024 0010 by Olivier Trejo RN  Activity Management: bedrest  Taken 10/22/2024 2022 by Olivier Trejo RN  Activity Management: bedrest  Goal: Effective Oxygenation and Ventilation  Intervention: Promote Airway Secretion Clearance  Recent Flowsheet Documentation  Taken 10/23/2024 0010 by Olivier Trejo RN  Activity Management: bedrest  Cough And Deep Breathing: done independently per patient  Taken 10/22/2024 2022 by Olivier Trejo RN  Activity Management: bedrest  Cough And Deep Breathing: done independently per patient  Intervention: Optimize Oxygenation and Ventilation  Recent Flowsheet Documentation  Taken 10/23/2024 0608 by Olivier Trejo RN  Head of Bed (HOB) Positioning: HOB elevated  Taken 10/23/2024 0408 by Olivier Trejo RN  Head of Bed (HOB) Positioning: HOB elevated  Taken 10/23/2024 0201 by Olivier Trejo RN  Head of Bed (HOB) Positioning: HOB elevated  Taken 10/23/2024 0010 by Olivier Trejo RN  Head of Bed (HOB) Positioning: HOB elevated  Taken 10/22/2024 2208 by Olivier Trejo RN  Head of Bed (HOB) Positioning: HOB elevated  Taken 10/22/2024 2022 by Olivier Trejo  RN  Head of Bed (HOB) Positioning: HOB elevated  Goal: Effective Breathing Pattern During Sleep  Intervention: Monitor and Manage Obstructive Sleep Apnea  Recent Flowsheet Documentation  Taken 10/23/2024 0608 by Olivier Trejo RN  Medication Review/Management: medications reviewed  Taken 10/23/2024 0408 by Olivier Trejo RN  Medication Review/Management: medications reviewed  Taken 10/23/2024 0201 by Olivier Trejo RN  Medication Review/Management: medications reviewed  Taken 10/23/2024 0010 by Olivier Trejo RN  Medication Review/Management: medications reviewed  Taken 10/22/2024 2208 by Olivier Trejo RN  Medication Review/Management: medications reviewed  Taken 10/22/2024 2022 by Olivier Trejo RN  Medication Review/Management: medications reviewed     Problem: Skin Injury Risk Increased  Goal: Skin Health and Integrity  Intervention: Optimize Skin Protection  Recent Flowsheet Documentation  Taken 10/23/2024 0608 by Olivier Trejo RN  Head of Bed (HOB) Positioning: HOB elevated  Taken 10/23/2024 0408 by Olivier Trejo RN  Head of Bed (HOB) Positioning: HOB elevated  Taken 10/23/2024 0201 by Olivier Trejo RN  Head of Bed (HOB) Positioning: HOB elevated  Taken 10/23/2024 0010 by Olivier Trejo RN  Activity Management: bedrest  Head of Bed (HOB) Positioning: HOB elevated  Taken 10/22/2024 2208 by Olivier Trejo RN  Head of Bed (HOB) Positioning: HOB elevated  Taken 10/22/2024 2022 by Olivier Trejo RN  Activity Management: bedrest  Pressure Reduction Techniques:   frequent weight shift encouraged   weight shift assistance provided  Head of Bed (HOB) Positioning: HOB elevated  Pressure Reduction Devices:   alternating pressure pump (SUSU)   pressure-redistributing mattress utilized

## 2024-10-24 LAB
ANION GAP SERPL CALCULATED.3IONS-SCNC: 13.4 MMOL/L (ref 5–15)
BASOPHILS # BLD AUTO: 0.06 10*3/MM3 (ref 0–0.2)
BASOPHILS NFR BLD AUTO: 0.5 % (ref 0–1.5)
BUN SERPL-MCNC: 24 MG/DL (ref 8–23)
BUN/CREAT SERPL: 23.1 (ref 7–25)
CALCIUM SPEC-SCNC: 8.9 MG/DL (ref 8.6–10.5)
CHLORIDE SERPL-SCNC: 97 MMOL/L (ref 98–107)
CO2 SERPL-SCNC: 28.6 MMOL/L (ref 22–29)
CREAT SERPL-MCNC: 1.04 MG/DL (ref 0.57–1)
DEPRECATED RDW RBC AUTO: 42.5 FL (ref 37–54)
EGFRCR SERPLBLD CKD-EPI 2021: 53.8 ML/MIN/1.73
EOSINOPHIL # BLD AUTO: 0.33 10*3/MM3 (ref 0–0.4)
EOSINOPHIL NFR BLD AUTO: 2.8 % (ref 0.3–6.2)
ERYTHROCYTE [DISTWIDTH] IN BLOOD BY AUTOMATED COUNT: 12.6 % (ref 12.3–15.4)
GLUCOSE BLDC GLUCOMTR-MCNC: 120 MG/DL (ref 70–130)
GLUCOSE BLDC GLUCOMTR-MCNC: 140 MG/DL (ref 70–130)
GLUCOSE BLDC GLUCOMTR-MCNC: 147 MG/DL (ref 70–130)
GLUCOSE BLDC GLUCOMTR-MCNC: 154 MG/DL (ref 70–130)
GLUCOSE BLDC GLUCOMTR-MCNC: 182 MG/DL (ref 70–130)
GLUCOSE SERPL-MCNC: 163 MG/DL (ref 65–99)
HCT VFR BLD AUTO: 35.6 % (ref 34–46.6)
HGB BLD-MCNC: 11.4 G/DL (ref 12–15.9)
IMM GRANULOCYTES # BLD AUTO: 0.04 10*3/MM3 (ref 0–0.05)
IMM GRANULOCYTES NFR BLD AUTO: 0.3 % (ref 0–0.5)
LYMPHOCYTES # BLD AUTO: 1.86 10*3/MM3 (ref 0.7–3.1)
LYMPHOCYTES NFR BLD AUTO: 15.9 % (ref 19.6–45.3)
MCH RBC QN AUTO: 29.5 PG (ref 26.6–33)
MCHC RBC AUTO-ENTMCNC: 32 G/DL (ref 31.5–35.7)
MCV RBC AUTO: 92 FL (ref 79–97)
MONOCYTES # BLD AUTO: 1.16 10*3/MM3 (ref 0.1–0.9)
MONOCYTES NFR BLD AUTO: 9.9 % (ref 5–12)
NEUTROPHILS NFR BLD AUTO: 70.6 % (ref 42.7–76)
NEUTROPHILS NFR BLD AUTO: 8.25 10*3/MM3 (ref 1.7–7)
NRBC BLD AUTO-RTO: 0 /100 WBC (ref 0–0.2)
PLATELET # BLD AUTO: 188 10*3/MM3 (ref 140–450)
PMV BLD AUTO: 12.3 FL (ref 6–12)
POTASSIUM SERPL-SCNC: 3.5 MMOL/L (ref 3.5–5.2)
RBC # BLD AUTO: 3.87 10*6/MM3 (ref 3.77–5.28)
SODIUM SERPL-SCNC: 139 MMOL/L (ref 136–145)
WBC NRBC COR # BLD AUTO: 11.7 10*3/MM3 (ref 3.4–10.8)

## 2024-10-24 PROCEDURE — 25010000002 ENOXAPARIN PER 10 MG: Performed by: INTERNAL MEDICINE

## 2024-10-24 PROCEDURE — 94761 N-INVAS EAR/PLS OXIMETRY MLT: CPT

## 2024-10-24 PROCEDURE — 94799 UNLISTED PULMONARY SVC/PX: CPT

## 2024-10-24 PROCEDURE — 82948 REAGENT STRIP/BLOOD GLUCOSE: CPT

## 2024-10-24 PROCEDURE — 63710000001 INSULIN REGULAR HUMAN PER 5 UNITS: Performed by: INTERNAL MEDICINE

## 2024-10-24 PROCEDURE — 94664 DEMO&/EVAL PT USE INHALER: CPT

## 2024-10-24 PROCEDURE — 85025 COMPLETE CBC W/AUTO DIFF WBC: CPT | Performed by: INTERNAL MEDICINE

## 2024-10-24 PROCEDURE — 80048 BASIC METABOLIC PNL TOTAL CA: CPT | Performed by: INTERNAL MEDICINE

## 2024-10-24 RX ORDER — FUROSEMIDE 40 MG/1
40 TABLET ORAL DAILY
Status: DISCONTINUED | OUTPATIENT
Start: 2024-10-24 | End: 2024-10-25 | Stop reason: HOSPADM

## 2024-10-24 RX ORDER — POLYETHYLENE GLYCOL 3350 17 G/17G
17 POWDER, FOR SOLUTION ORAL 2 TIMES DAILY
Status: DISCONTINUED | OUTPATIENT
Start: 2024-10-24 | End: 2024-10-25 | Stop reason: HOSPADM

## 2024-10-24 RX ORDER — POTASSIUM CHLORIDE 1.5 G/1.58G
40 POWDER, FOR SOLUTION ORAL ONCE
Status: COMPLETED | OUTPATIENT
Start: 2024-10-24 | End: 2024-10-24

## 2024-10-24 RX ORDER — HYDROCODONE BITARTRATE AND ACETAMINOPHEN 5; 325 MG/1; MG/1
1 TABLET ORAL EVERY 6 HOURS PRN
Status: DISCONTINUED | OUTPATIENT
Start: 2024-10-24 | End: 2024-10-25 | Stop reason: HOSPADM

## 2024-10-24 RX ADMIN — CEFDINIR 300 MG: 300 CAPSULE ORAL at 21:41

## 2024-10-24 RX ADMIN — FUROSEMIDE 40 MG: 40 TABLET ORAL at 09:09

## 2024-10-24 RX ADMIN — CEFDINIR 300 MG: 300 CAPSULE ORAL at 09:11

## 2024-10-24 RX ADMIN — ACETAMINOPHEN 325MG 650 MG: 325 TABLET ORAL at 17:40

## 2024-10-24 RX ADMIN — MEMANTINE HYDROCHLORIDE 10 MG: 10 TABLET, FILM COATED ORAL at 09:09

## 2024-10-24 RX ADMIN — MEMANTINE HYDROCHLORIDE 10 MG: 10 TABLET, FILM COATED ORAL at 21:41

## 2024-10-24 RX ADMIN — HYDROCODONE BITARTRATE AND ACETAMINOPHEN 1 TABLET: 5; 325 TABLET ORAL at 21:51

## 2024-10-24 RX ADMIN — POLYETHYLENE GLYCOL 3350 17 G: 17 POWDER, FOR SOLUTION ORAL at 21:41

## 2024-10-24 RX ADMIN — METFORMIN HYDROCHLORIDE 500 MG: 500 TABLET, EXTENDED RELEASE ORAL at 09:09

## 2024-10-24 RX ADMIN — INSULIN HUMAN 2 UNITS: 100 INJECTION, SOLUTION PARENTERAL at 13:30

## 2024-10-24 RX ADMIN — ATORVASTATIN CALCIUM 10 MG: 20 TABLET, FILM COATED ORAL at 09:11

## 2024-10-24 RX ADMIN — EMPAGLIFLOZIN 10 MG: 10 TABLET, FILM COATED ORAL at 09:11

## 2024-10-24 RX ADMIN — HYDROCODONE BITARTRATE AND ACETAMINOPHEN 1 TABLET: 5; 325 TABLET ORAL at 09:11

## 2024-10-24 RX ADMIN — SODIUM CHLORIDE TAB 1 GM 1 G: 1 TAB at 21:41

## 2024-10-24 RX ADMIN — ASPIRIN 81 MG: 81 TABLET, CHEWABLE ORAL at 09:09

## 2024-10-24 RX ADMIN — METOPROLOL SUCCINATE 50 MG: 50 TABLET, FILM COATED, EXTENDED RELEASE ORAL at 21:41

## 2024-10-24 RX ADMIN — ACETAMINOPHEN 325MG 650 MG: 325 TABLET ORAL at 06:41

## 2024-10-24 RX ADMIN — SODIUM CHLORIDE TAB 1 GM 1 G: 1 TAB at 09:09

## 2024-10-24 RX ADMIN — DONEPEZIL HYDROCHLORIDE 10 MG: 10 TABLET, FILM COATED ORAL at 21:41

## 2024-10-24 RX ADMIN — METOPROLOL SUCCINATE 50 MG: 50 TABLET, FILM COATED, EXTENDED RELEASE ORAL at 09:09

## 2024-10-24 RX ADMIN — SACUBITRIL AND VALSARTAN 1 TABLET: 24; 26 TABLET, FILM COATED ORAL at 09:09

## 2024-10-24 RX ADMIN — ENOXAPARIN SODIUM 40 MG: 100 INJECTION SUBCUTANEOUS at 13:30

## 2024-10-24 RX ADMIN — PANTOPRAZOLE SODIUM 40 MG: 40 TABLET, DELAYED RELEASE ORAL at 06:41

## 2024-10-24 RX ADMIN — SACUBITRIL AND VALSARTAN 1 TABLET: 24; 26 TABLET, FILM COATED ORAL at 21:41

## 2024-10-24 RX ADMIN — IPRATROPIUM BROMIDE AND ALBUTEROL SULFATE 3 ML: 2.5; .5 SOLUTION RESPIRATORY (INHALATION) at 07:15

## 2024-10-24 RX ADMIN — INSULIN HUMAN 2 UNITS: 100 INJECTION, SOLUTION PARENTERAL at 06:41

## 2024-10-24 RX ADMIN — CLOPIDOGREL BISULFATE 75 MG: 75 TABLET, FILM COATED ORAL at 09:11

## 2024-10-24 RX ADMIN — POLYETHYLENE GLYCOL 3350 17 G: 17 POWDER, FOR SOLUTION ORAL at 09:11

## 2024-10-24 RX ADMIN — IPRATROPIUM BROMIDE AND ALBUTEROL SULFATE 3 ML: 2.5; .5 SOLUTION RESPIRATORY (INHALATION) at 14:10

## 2024-10-24 RX ADMIN — METFORMIN HYDROCHLORIDE 500 MG: 500 TABLET, EXTENDED RELEASE ORAL at 17:40

## 2024-10-24 RX ADMIN — POTASSIUM CHLORIDE 40 MEQ: 1.5 FOR SOLUTION ORAL at 09:11

## 2024-10-24 RX ADMIN — IPRATROPIUM BROMIDE AND ALBUTEROL SULFATE 3 ML: 2.5; .5 SOLUTION RESPIRATORY (INHALATION) at 19:15

## 2024-10-24 NOTE — SIGNIFICANT NOTE
10/24/24 1144   OTHER   Discipline physical therapist   Rehab Time/Intention   Session Not Performed patient/family declined treatment  (Pt declined PT this AM despite encouragement. Will f/u tomorrow.)   Recommendation   PT - Next Appointment 10/25/24

## 2024-10-24 NOTE — PLAN OF CARE
Goal Outcome Evaluation:                                          Patient alert with confusion and c/o pain to her left lower leg- mediated per MAR. Patient encouraged to sit up in chair for meals but refused. Patient is self turning and sometimes will allow staff to place a pillow under her bottom. Patient with good appetite. Orthopedic consulted today. Miralax given this am without any results so far today. No acute distress noted, will continue to monitor.

## 2024-10-24 NOTE — CASE MANAGEMENT/SOCIAL WORK
Continued Stay Note  [facility]     Patient Name: Kecia Martinez  MRN: [id number]  Today's Date: 10/24/2024    Admit Date: 10/18/2024    Plan: Pending   Discharge Plan       Row Name 10/24/24 5714       Plan    Plan Pending    Plan Comments CCP received call from patient's daughter, Jennifer. She voiced concerns of patient returning to [facility]. CCP spoke with patient's guardian, Dorys Johnson and updated her on concerns. State guardian requested CCP place broad referrals for placement. CCP discussed if unable to find alternate placement, patient may need to return to [facility] at d/c. Referral places in [facility] and called to liaisons. CCP updated RN/MD. Ortho consult pending. PDC tomorrow. Oksana THOMSON                   Discharge Codes    No documentation.                 Expected Discharge Date and Time       Expected Discharge Date Expected Discharge Time    Oct 24, 2024               ALIX Spears

## 2024-10-24 NOTE — DISCHARGE PLACEMENT REQUEST
"Mario Mazariegos (84 y.o. Female)       Date of Birth   1940    Social Security Number       Address   [address] [place] [address]    Home Phone   [phone]    MRN   [id number]       Anabaptism   None    Marital Status                               Admission Date   10/18/24    Admission Type   Emergency    Admitting Provider   Ministerio Armas MD    Attending Provider   Ministerio Armas MD    Department, Room/Bed   [facility], 3111/1       Discharge Date       Discharge Disposition       Discharge Destination                                 Attending Provider: Ministerio Armas MD    Allergies: Penicillins, Sulfa Antibiotics, Sulfanilamide    Isolation: Contact   Infection: Candida Auris (rule out) (10/21/24)   Code Status: CPR    Ht: 160 cm (63\")   Wt: 55.8 kg (123 lb)    Admission Cmt: None   Principal Problem: Acute hypoxemic respiratory failure [J96.01]                   Active Insurance as of 10/18/2024       Primary Coverage       Payor Plan Insurance Group Employer/Plan Group    MEDICARE MEDICARE A & B        Payor Plan Address Payor Plan Phone Number Payor Plan Fax Number Effective Dates    [address] [phone]  8/1/2007 - None Entered    [place] [address]         Subscriber Name Subscriber Birth Date Member ID       MARIO MAZARIEGOS 1940 [id number]                     Emergency Contacts        (Rel.) Home Phone Work Phone Mobile Phone    DANIELLA ANDRES (Other) -- -- [phone]    Afterhours,Guardianship (Legal Guardian) -- [phone] --    LILIANA CASTILLO (Daughter) [phone] -- [phone]    Pietro Mazariegos [phone] -- --                "

## 2024-10-24 NOTE — CASE MANAGEMENT/SOCIAL WORK
Continued Stay Note  [facility]     Patient Name: Kecia Martinez  MRN: [id number]  Today's Date: 10/24/2024    Admit Date: 10/18/2024    Plan: Return to [facility] and rehab; [facility] for 5:00 P.M   Discharge Plan       Row Name 10/24/24 1012       Plan    Plan Return to [facility] and re[facility]; [facility] for 5:00 P.M    Plan Comments CCP spoke with patient's guardian, Dorys Johnson; updated her on facility denials and discussed patient is ready for d/c today. Patient's guardian in agreement for patient to return to [facility] and rehab. CCP arranged [facility] for 5:00 P.M. Patient's guardian aware of transport time. CCP updated [place] d/c today. Pharmacy updated. Oksana THOMSON                   Discharge Codes    No documentation.                 Expected Discharge Date and Time       Expected Discharge Date Expected Discharge Time    Oct 24, 2024               ALIX Spears

## 2024-10-24 NOTE — PLAN OF CARE
Goal Outcome Evaluation:   Pt resting on the bed, sr on monitor, RA, c/o pain medicated with tylenol x 1, vss, no ss of acute distress noted, will continue to monitor.      Problem: Adult Inpatient Plan of Care  Goal: Absence of Hospital-Acquired Illness or Injury  Intervention: Identify and Manage Fall Risk  Recent Flowsheet Documentation  Taken 10/24/2024 0608 by Olivier Trejo RN  Safety Promotion/Fall Prevention:   activity supervised   safety round/check completed   room organization consistent  Taken 10/24/2024 0408 by Olivier Trejo RN  Safety Promotion/Fall Prevention:   activity supervised   room organization consistent   safety round/check completed  Taken 10/24/2024 0208 by Olivier Trejo RN  Safety Promotion/Fall Prevention:   activity supervised   room organization consistent   safety round/check completed  Taken 10/24/2024 0010 by Olivier Trejo RN  Safety Promotion/Fall Prevention:   activity supervised   room organization consistent   safety round/check completed  Taken 10/23/2024 2207 by Olivier Trejo RN  Safety Promotion/Fall Prevention:   activity supervised   room organization consistent   safety round/check completed  Taken 10/23/2024 2010 by Olivier Trejo RN  Safety Promotion/Fall Prevention:   activity supervised   room organization consistent   safety round/check completed  Intervention: Prevent Skin Injury  Recent Flowsheet Documentation  Taken 10/24/2024 0608 by Olivier Trejo RN  Body Position:   left   tilted  Taken 10/24/2024 0408 by Olivier Trejo RN  Body Position: supine  Taken 10/24/2024 0208 by Olivier Trejo RN  Body Position:   left   tilted  Taken 10/24/2024 0010 by Olivier Trejo RN  Body Position: position changed independently  Taken 10/23/2024 2207 by Olivier Trejo RN  Body Position:   right   tilted  Taken 10/23/2024 2010 by Olivier Trejo RN  Body Position: supine  Skin Protection: incontinence pads utilized  Intervention:  Prevent and Manage VTE (Venous Thromboembolism) Risk  Recent Flowsheet Documentation  Taken 10/24/2024 0010 by Olivier Trejo RN  VTE Prevention/Management: (on Lovenox) other (see comments)  Taken 10/23/2024 2010 by Olivier Trejo RN  VTE Prevention/Management: (Lovenox) other (see comments)  Intervention: Prevent Infection  Recent Flowsheet Documentation  Taken 10/24/2024 0608 by Olivier Trejo RN  Infection Prevention:   hand hygiene promoted   rest/sleep promoted  Taken 10/24/2024 0408 by Olivier Trejo RN  Infection Prevention:   hand hygiene promoted   personal protective equipment utilized   rest/sleep promoted  Taken 10/24/2024 0208 by Olivier Trejo RN  Infection Prevention:   hand hygiene promoted   rest/sleep promoted  Taken 10/24/2024 0010 by Olivier Trejo RN  Infection Prevention:   hand hygiene promoted   rest/sleep promoted  Taken 10/23/2024 2207 by Olivier Trejo RN  Infection Prevention:   hand hygiene promoted   rest/sleep promoted  Taken 10/23/2024 2010 by Olivier Trejo RN  Infection Prevention:   hand hygiene promoted   rest/sleep promoted  Goal: Optimal Comfort and Wellbeing  Intervention: Monitor Pain and Promote Comfort  Recent Flowsheet Documentation  Taken 10/23/2024 2010 by Olivier Trejo RN  Pain Management Interventions: pain medication given  Intervention: Provide Person-Centered Care  Recent Flowsheet Documentation  Taken 10/24/2024 0010 by Olivier Trejo RN  Trust Relationship/Rapport: care explained  Taken 10/23/2024 2010 by Olivier Trejo RN  Trust Relationship/Rapport: care explained     Problem: Fall Injury Risk  Goal: Absence of Fall and Fall-Related Injury  Intervention: Identify and Manage Contributors  Recent Flowsheet Documentation  Taken 10/24/2024 0608 by Olivier Trejo RN  Medication Review/Management: medications reviewed  Taken 10/24/2024 0408 by Olivier Trejo RN  Medication Review/Management: medications  reviewed  Taken 10/24/2024 0208 by Olivier Trejo RN  Medication Review/Management: medications reviewed  Taken 10/24/2024 0010 by Olivier Trejo RN  Medication Review/Management: medications reviewed  Taken 10/23/2024 2010 by Olivier Trejo RN  Medication Review/Management: medications reviewed  Intervention: Promote Injury-Free Environment  Recent Flowsheet Documentation  Taken 10/24/2024 0608 by Olivier Trejo RN  Safety Promotion/Fall Prevention:   activity supervised   safety round/check completed   room organization consistent  Taken 10/24/2024 0408 by Olivier Trejo RN  Safety Promotion/Fall Prevention:   activity supervised   room organization consistent   safety round/check completed  Taken 10/24/2024 0208 by Olivier Trejo RN  Safety Promotion/Fall Prevention:   activity supervised   room organization consistent   safety round/check completed  Taken 10/24/2024 0010 by Olivier Trejo RN  Safety Promotion/Fall Prevention:   activity supervised   room organization consistent   safety round/check completed  Taken 10/23/2024 2207 by Olivier Trejo RN  Safety Promotion/Fall Prevention:   activity supervised   room organization consistent   safety round/check completed  Taken 10/23/2024 2010 by Olivier Trejo RN  Safety Promotion/Fall Prevention:   activity supervised   room organization consistent   safety round/check completed     Problem: Comorbidity Management  Goal: Blood Glucose Level Within Target Range  Intervention: Monitor and Manage Glycemia  Recent Flowsheet Documentation  Taken 10/24/2024 0608 by Olivier Trejo RN  Medication Review/Management: medications reviewed  Taken 10/24/2024 0408 by Olivier Trejo RN  Medication Review/Management: medications reviewed  Taken 10/24/2024 0208 by Olivier Trejo RN  Medication Review/Management: medications reviewed  Taken 10/24/2024 0010 by Olivier Trejo RN  Medication Review/Management: medications  reviewed  Taken 10/23/2024 2010 by Olivier Trejo RN  Medication Review/Management: medications reviewed  Goal: Maintenance of Heart Failure Symptom Control  Intervention: Maintain Heart Failure Management  Recent Flowsheet Documentation  Taken 10/24/2024 0608 by Olivier Trejo RN  Medication Review/Management: medications reviewed  Taken 10/24/2024 0408 by Olivier Trejo RN  Medication Review/Management: medications reviewed  Taken 10/24/2024 0208 by Olivier Trejo RN  Medication Review/Management: medications reviewed  Taken 10/24/2024 0010 by Olivier Trejo RN  Medication Review/Management: medications reviewed  Taken 10/23/2024 2010 by Olivier Trejo RN  Medication Review/Management: medications reviewed  Goal: Blood Pressure in Desired Range  Intervention: Maintain Blood Pressure Management  Recent Flowsheet Documentation  Taken 10/24/2024 0608 by Olivier Trejo RN  Medication Review/Management: medications reviewed  Taken 10/24/2024 0408 by Olivier Trejo RN  Medication Review/Management: medications reviewed  Taken 10/24/2024 0208 by Olivier Trejo RN  Medication Review/Management: medications reviewed  Taken 10/24/2024 0010 by Olivier Trejo RN  Medication Review/Management: medications reviewed  Taken 10/23/2024 2010 by Olivier Trejo RN  Medication Review/Management: medications reviewed  Goal: Maintenance of Osteoarthritis Symptom Control  Intervention: Maintain Osteoarthritis Symptom Control  Recent Flowsheet Documentation  Taken 10/24/2024 0608 by Olivier Trejo RN  Medication Review/Management: medications reviewed  Taken 10/24/2024 0408 by Olivier Trejo RN  Medication Review/Management: medications reviewed  Taken 10/24/2024 0208 by Olivier Trejo RN  Medication Review/Management: medications reviewed  Taken 10/24/2024 0010 by Olivier Trejo RN  Activity Management: bedrest  Medication Review/Management: medications reviewed  Taken  10/23/2024 2010 by Olivier Trejo RN  Activity Management: bedrest  Medication Review/Management: medications reviewed     Problem: Heart Failure  Goal: Optimal Coping  Intervention: Support Psychosocial Response  Recent Flowsheet Documentation  Taken 10/24/2024 0010 by Olivier Trejo RN  Supportive Measures: active listening utilized  Taken 10/23/2024 2010 by Olivier Trejo RN  Supportive Measures: active listening utilized  Goal: Optimal Cardiac Output and Blood Flow  Intervention: Optimize Cardiac Output  Recent Flowsheet Documentation  Taken 10/24/2024 0010 by Olivier Trejo RN  Environmental Support: calm environment promoted  Taken 10/23/2024 2010 by Olivier Trejo RN  Environmental Support: calm environment promoted  Goal: Optimal Functional Ability  Intervention: Optimize Functional Ability  Recent Flowsheet Documentation  Taken 10/24/2024 0010 by Olivier Trejo RN  Activity Management: bedrest  Taken 10/23/2024 2010 by Olivier Trejo RN  Activity Management: bedrest  Goal: Effective Oxygenation and Ventilation  Intervention: Promote Airway Secretion Clearance  Recent Flowsheet Documentation  Taken 10/24/2024 0010 by Olivier Trejo RN  Activity Management: bedrest  Cough And Deep Breathing: done independently per patient  Taken 10/23/2024 2010 by Olivier Trejo RN  Activity Management: bedrest  Cough And Deep Breathing: done independently per patient  Intervention: Optimize Oxygenation and Ventilation  Recent Flowsheet Documentation  Taken 10/24/2024 0608 by Olivier Trejo RN  Head of Bed ([facility]) Positioning: HOB elevated  Taken 10/24/2024 0408 by Olivier Trejo RN  Head of Bed ([facility]) Positioning: HOB elevated  Taken 10/24/2024 0208 by Olivier Trejo RN  Head of Bed ([facility]) Positioning: HOB elevated  Taken 10/24/2024 0010 by Olivier Trejo RN  Head of Bed ([facility]) Positioning: HOB elevated  Taken 10/23/2024 2207 by Olivier Trejo RN  Head of Bed ([facility])  Positioning: HOB elevated  Taken 10/23/2024 2010 by Olivier Trejo RN  Head of Bed ([facility]) Positioning: HOB elevated  Goal: Effective Breathing Pattern During Sleep  Intervention: Monitor and Manage Obstructive Sleep Apnea  Recent Flowsheet Documentation  Taken 10/24/2024 0608 by Olivier Trejo RN  Medication Review/Management: medications reviewed  Taken 10/24/2024 0408 by Olivier Terjo RN  Medication Review/Management: medications reviewed  Taken 10/24/2024 0208 by Olivier Trejo RN  Medication Review/Management: medications reviewed  Taken 10/24/2024 0010 by Olivier Trejo RN  Medication Review/Management: medications reviewed  Taken 10/23/2024 2010 by Olivier Trejo RN  Medication Review/Management: medications reviewed     Problem: Skin Injury Risk Increased  Goal: Skin Health and Integrity  Intervention: Optimize Skin Protection  Recent Flowsheet Documentation  Taken 10/24/2024 0608 by Olivier Trejo RN  Head of Bed ([facility]) Positioning: HOB elevated  Taken 10/24/2024 0408 by Olivier Trejo RN  Head of Bed ([facility]) Positioning: HOB elevated  Taken 10/24/2024 0208 by Olivier Trejo RN  Head of Bed ([facility]) Positioning: HOB elevated  Taken 10/24/2024 0010 by Olivier Trejo RN  Activity Management: bedrest  Pressure Reduction Techniques:   frequent weight shift encouraged   weight shift assistance provided  Head of Bed (HOB) Positioning: HOB elevated  Pressure Reduction Devices:   alternating pressure pump (SUSU)   pressure-redistributing mattress utilized  Taken 10/23/2024 2207 by Olivier Trejo RN  Head of Bed ([facility]OB) Positioning: HOB elevated  Taken 10/23/2024 2010 by Olivier Trejo RN  Activity Management: bedrest  Pressure Reduction Techniques:   frequent weight shift encouraged   weight shift assistance provided  Head of Bed (HOB) Positioning: HOB elevated  Pressure Reduction Devices:   alternating pressure pump (SUSU)   pressure-redistributing mattress utilized  Skin  Protection: incontinence pads utilized

## 2024-10-24 NOTE — PROGRESS NOTES
DAILY PROGRESS NOTE  [facility], [facility]    10/24/2024    Patient Identification:  Name: Kecia Martinez  Age: 84 y.o.  Sex: female  :  1940  MRN: [id number]           Primary Care Physician: Ministerio Armas MD    Subjective:    Interval History:    Patient is still complaining of left hip pain and left knee pain.  Making difficult to participate with therapy  X-rays reviewed  Has bilateral pleural effusion right greater than left.  On diuretics.  No chest pain or shortness of breath.  No nausea, vomiting, diarrhea, constipation   Vital signs stable, saturation in room air is 98 Drusilla bursitis, left knee arthritis.  %  2D echo results reviewed: EF 52.1%.  Blood sugar in the mid 100s.      ROS:     No nausea, vomiting, diarrhea, constipation today.  No chest pain.  No shortness of breath.    Objective:    Scheduled Meds:  aspirin, 81 mg, Oral, Daily  atorvastatin, 10 mg, Oral, Daily  cefdinir, 300 mg, Oral, Q12H  clopidogrel, 75 mg, Oral, Daily  donepezil, 10 mg, Oral, Nightly  empagliflozin, 10 mg, Oral, Daily  enoxaparin, 40 mg, Subcutaneous, Q24H  furosemide, 40 mg, Oral, BID  insulin regular, 2-7 Units, Subcutaneous, Q6H  ipratropium-albuterol, 3 mL, Nebulization, Q6H - RT  memantine, 10 mg, Oral, BID  metFORMIN ER, 500 mg, Oral, BID With Meals  metoprolol succinate XL, 50 mg, Oral, BID  pantoprazole, 40 mg, Oral, Q AM  sacubitril-valsartan, 1 tablet, Oral, Q12H  sodium chloride, 1 g, Oral, BID        Continuous Infusions:       PRN Meds:    acetaminophen    dextrose    dextrose    glucagon (human recombinant)    sodium chloride    [COMPLETED] Insert Peripheral IV **AND** sodium chloride    Intake/Output:    Intake/Output Summary (Last 24 hours) at 10/24/2024 0846  Last data filed at 10/24/2024 0532  Gross per 24 hour   Intake 1040 ml   Output 1500 ml   Net -460 ml         Exam:    T MAX 24 hrs: Temp (24hrs), Av.2 °F (36.8 °C), Min:97.5 °F (36.4 °C), Max:98.7  "°F (37.1 °C)    Vitals Ranges:   Temp:  [97.5 °F (36.4 °C)-98.7 °F (37.1 °C)] 97.5 °F (36.4 °C)  Heart Rate:  [78-96] 84  Resp:  [16-18] 18  BP: (110-125)/(53-69) 111/56    /56   Pulse 84   Temp 97.5 °F (36.4 °C) (Oral)   Resp 18   Ht 160 cm (63\")   Wt 55.8 kg (123 lb)   SpO2 96%   BMI 21.79 kg/m²     General: Alert, oriented x 1-2. Cooperative, no distress this morning, appears stated age  Neck: Supple, symmetrical, trachea midline, no adenopathy;              thyroid:  no enlargement/tenderness/nodules;              no carotid bruit or JVD  Cardiovascular: Tachycardic, regular rhythm and intact distal pulses.              Exam reveals no gallop and no friction rub. No murmur heard  Pulmonary: Decreased breath sounds bilaterally.  Few rhonchi at bases.  No wheezing, no rales.    Abdominal: Soft, nontender, bowel sounds active all four quadrants,     no masses, no hepatomegaly, no splenomegaly.   Extremities: Pain in the left hip and it trochanteric area as well as in the right knee, right about the patella.    Pulses: 2 + symmetric all extremities  Neurological: Patient is alert and oriented to person.  No new focal sensorimotor deficit.  Skin: Skin color, texture, normal. Turgor is decreased. No rashes or lesions        Data Review:    Results from last 7 days   Lab Units 10/24/24  0424 10/23/24  0548 10/21/24  0505   WBC 10*3/mm3 11.70* 11.46* 9.24   HEMOGLOBIN g/dL 11.4* 12.0 11.1*   HEMATOCRIT % 35.6 36.5 34.0   PLATELETS 10*3/mm3 188 202 175       Results from last 7 days   Lab Units 10/24/24  0424 10/23/24  0548 10/22/24  1358 10/21/24  0505 10/19/24  0548 10/18/24  1711   SODIUM mmol/L 139 134* 137 138   < > 136   POTASSIUM mmol/L 3.5 3.6 3.8 3.5   < > 5.0   CHLORIDE mmol/L 97* 96* 97* 102   < > 101   CO2 mmol/L 28.6 30.0* 28.1 30.0*   < > 19.4*   BUN mg/dL 24* 21 21 16   < > 24*   CREATININE mg/dL 1.04* 0.87 1.02* 0.77   < > 1.02*   CALCIUM mg/dL 8.9 8.6 8.4* 8.6   < > 9.7   BILIRUBIN mg/dL  -- "   --   --  0.3  --  0.4   ALK PHOS U/L  --   --   --  75  --  85   ALT (SGPT) U/L  --   --   --  11  --  12   AST (SGOT) U/L  --   --   --  26  --  91*   GLUCOSE mg/dL 163* 176* 187* 175*   < > 359*    < > = values in this interval not displayed.                 Lab Results   Lab Value Date/Time    TROPONINT 3,974 (C) 10/19/2024 0548    TROPONINT 2,113 (C) 10/18/2024 2131    TROPONINT 1,522 (C) 10/18/2024 1711    TROPONINT 25 (H) 11/02/2023 1210    TROPONINT 24 (H) 11/02/2023 1011       Microbiology Results (last 10 days)       Procedure Component Value - Date/Time    CANDIDA AURIS SCREEN - Swab, Axilla Right, Axilla Left and Groin [[id number]]  (Normal) Collected: 10/21/24 1205    Lab Status: Preliminary result Specimen: Swab from Axilla Right, Axilla Left and Groin Updated: 10/23/24 1150     Candida Auris Screen Culture Culture in progress    Blood Culture - Blood, Arm, Left [[id number]]  (Normal) Collected: 10/18/24 2131    Lab Status: Final result Specimen: Blood from Arm, Left Updated: 10/23/24 2145     Blood Culture No growth at 5 days    Blood Culture - Blood, Arm, Right [[id number]]  (Normal) Collected: 10/18/24 2131    Lab Status: Final result Specimen: Blood from Arm, Right Updated: 10/23/24 2145     Blood Culture No growth at 5 days    Respiratory Panel PCR w/COVID-19(SARS-CoV-2) DAR/OUMAR/ANDREW/PAD/COR/ANUPAM In-House, NP Swab in UTM/VTM, 2 HR TAT - Swab, Nasopharynx [[id number]]  (Normal) Collected: 10/18/24 1711    Lab Status: Final result Specimen: Swab from Nasopharynx Updated: 10/18/24 1819     ADENOVIRUS, PCR Not Detected     Coronavirus 229E Not Detected     Coronavirus HKU1 Not Detected     Coronavirus NL63 Not Detected     Coronavirus OC43 Not Detected     COVID19 Not Detected     Human Metapneumovirus Not Detected     Human Rhinovirus/Enterovirus Not Detected     Influenza A PCR Not Detected     Influenza B PCR Not Detected     Parainfluenza Virus 1 Not Detected     Parainfluenza Virus 2 Not Detected      Parainfluenza Virus 3 Not Detected     Parainfluenza Virus 4 Not Detected     RSV, PCR Not Detected     Bordetella pertussis pcr Not Detected     Bordetella parapertussis PCR Not Detected     Chlamydophila pneumoniae PCR Not Detected     Mycoplasma pneumo by PCR Not Detected    Narrative:      In the setting of a positive respiratory panel with a viral infection PLUS a negative procalcitonin without other underlying concern for bacterial infection, consider observing off antibiotics or discontinuation of antibiotics and continue supportive care. If the respiratory panel is positive for atypical bacterial infection (Bordetella pertussis, Chlamydophila pneumoniae, or Mycoplasma pneumoniae), consider antibiotic de-escalation to target atypical bacterial infection.             Imaging Results (Last 7 Days)       Procedure Component Value Units Date/Time    XR Knee 1 or 2 View Right [[id number]] Collected: 10/1940     Updated: 10/23/24 1947    Narrative:      XR HIP W OR WO PELVIS 2-3 VIEW LEFT-, XR KNEE 1 OR 2 VW RIGHT-     Clinical: Left-sided hip pain, knee pain     AP pelvis left hip findings: Moderate to substantial left hip joint  degeneration. There is bone hypertrophy demonstrated about the greater  trochanter which can be seen with trochanteric bursitis. The left  hemipelvis is satisfactory in appearance. No avascular necrosis,  fracture or bone lesion seen. Vascular arterial calcifications noted  within the soft tissues.     There is a lesser degree of right hip joint degeneration noted.     CONCLUSION: Degenerative change as described above.     Left knee findings: There is narrowing of the medial and lateral  compartments, near bone-on-bone configuration medial compartment. There  is periarticular bone hypertrophy along the medial and lateral  compartments. There is patellofemoral joint space loss with exuberant  periarticular bone hypertrophy at this location. There is patellar spur  formation. No joint  effusion or fracture nor bone lesion seen. Vascular  arterial calcifications demonstrated within the soft tissues. Diffuse  bony demineralization.     CONCLUSION: Considerable right knee joint degeneration as described  above.     This report was finalized on 10/23/2024 7:44 PM by Dr. Adiel Mason M.D on Workstation: [id number]       XR Hip With or Without Pelvis 2 - 3 View Left [[id number]] Collected: 10/1940     Updated: 10/23/24 1947    Narrative:      XR HIP W OR WO PELVIS 2-3 VIEW LEFT-, XR KNEE 1 OR 2 VW RIGHT-     Clinical: Left-sided hip pain, knee pain     AP pelvis left hip findings: Moderate to substantial left hip joint  degeneration. There is bone hypertrophy demonstrated about the greater  trochanter which can be seen with trochanteric bursitis. The left  hemipelvis is satisfactory in appearance. No avascular necrosis,  fracture or bone lesion seen. Vascular arterial calcifications noted  within the soft tissues.     There is a lesser degree of right hip joint degeneration noted.     CONCLUSION: Degenerative change as described above.     Left knee findings: There is narrowing of the medial and lateral  compartments, near bone-on-bone configuration medial compartment. There  is periarticular bone hypertrophy along the medial and lateral  compartments. There is patellofemoral joint space loss with exuberant  periarticular bone hypertrophy at this location. There is patellar spur  formation. No joint effusion or fracture nor bone lesion seen. Vascular  arterial calcifications demonstrated within the soft tissues. Diffuse  bony demineralization.     CONCLUSION: Considerable right knee joint degeneration as described  above.     This report was finalized on 10/23/2024 7:44 PM by Dr. Adiel Mason M.D on Workstation: [id number]       CT Chest Without Contrast Diagnostic [[id number]] Collected: 10/20/24 1621     Updated: 10/20/24 1628    Narrative:      CT CHEST WO CONTRAST DIAGNOSTIC-      Radiation dose reduction techniques were utilized, including automated  exposure control and exposure modulation based on body size.           FINDINGS:  1. The esophagus is satisfactory in course and caliber. Heart size  within normal limits. Heavy coronary artery calcification.  Atherosclerotic calcification of a normal diameter aorta.     2. Moderate-sized bilateral pleural effusions, free-flowing, size on the  right greater than the left. Consolidation/atelectasis at the base of  both lower lobes, likewise greater on the right than the left. Patchy  areas of groundglass infiltrate demonstrated within the right and left  lower lobes as well as the right upper lobe.     3. 6 mm noncalcified nodular density right upper lobe on image #36.  Adjacent similar 3 mm nodule seen on image 32. This could be  infectious/inflammatory or neoplastic. Short-term follow-up recommended  in 6 months.     4. There is some fullness within the right hilum, cannot exclude mildly  enlarged lymph nodes. There is an enlarged precarinal lymph node within  the mediastinum measuring 11 mm.     5. Limited images through the upper abdomen are unremarkable. No acute  osseous abnormality. Remainder is unremarkable.              This report was finalized on 10/20/2024 4:25 PM by Dr. Adiel Mason M.D on Workstation: [id number]       XR Chest 1 View [[id number]] Collected: 10/19/24 1550     Updated: 10/19/24 1557    Narrative:      XR CHEST 1 VW-     HISTORY: Female who is 84 years-old, short of breath     TECHNIQUE: Frontal views of the chest     COMPARISON: 10/18/2024     FINDINGS: The heart size is normal. Aorta is calcified. Pulmonary  vasculature is slightly congested. Increased opacity at the right mid to  lower lung suggests increased atelectasis/infiltrate/pleural effusion.  Persistent left basilar atelectasis/infiltrate/effusion. No  pneumothorax. No acute osseous process.       Impression:      Persistent right more than left  opacities, mildly increased  on the right.     This report was finalized on 10/19/2024 3:51 PM by Dr. Javed Terrell M.D on Workstation: [id number]       XR Chest 1 View [[id number]] Collected: 10/18/24 1746     Updated: 10/18/24 1750    Narrative:      XR CHEST 1 VW-     Clinical: Shortness of breath     FINDINGS: Cardiac size within normal limits, there is atherosclerotic  calcification of the aorta. There is vascular congestion with vague  opacity at both lung bases, greater on the right than the left, likely  associated pleural effusion effusions. There is bibasilar atelectasis  and/or infiltrate, pneumonia not excluded. Remainder is unremarkable.     This report was finalized on 10/18/2024 5:47 PM by Dr. Adiel Mason M.D on Workstation: [id number]                 Assessment:        Acute hypoxemic respiratory failure  Non-ST elevation MI  Pulmonary edema  Leukocytosis,   Hypertension  Hyperlipidemia  Coronary artery disease, history MI  Peripheral vascular disease  Diabetes mellitus  Alzheimer's dementia  Left hip pain/left knee pain.  Trochanteric bursitis.  Left knee arthritis.  Constipation    Plan:    Still complaining of left hip and left knee pain.  X-rays noted.  Will ask orthopedics to evaluate patient.  Will add as needed pain medication.  Will add MiraLAX for constipation  Continue and complete course of p.o. antibiotics.    Continue diuretics for pulmonary edema, recent MI, pleural effusions.  Still with considerably pleural effusions.    Continue treatment for non-ST elevation MI, on medical therapy at this time.   2D echo with preserved ejection fraction, but hypokinesis of basal inferior and inferolateral walls.  Continue aspirin, Plavix, statin, metoprolol, Entresto.  Monitor volume status, while on Lasix, a little dry today, will change Lasix to once a day.  Monitor and correct electrolytes, stable at this time.    Accu-Chek and SSI, blood sugar in the mid  100s.  Will continue to  monitor.   Monitor mental status, back to baseline at this time.    DVT prophylaxis: Lovenox dose for prophylaxis.    Stress ulcer prophylaxis: Pepcid.  Labs in am      Ministerio Armas MD  10/24/2024  08:46 EDT

## 2024-10-25 VITALS
HEIGHT: 63 IN | TEMPERATURE: 98.2 F | OXYGEN SATURATION: 97 % | RESPIRATION RATE: 18 BRPM | BODY MASS INDEX: 21.79 KG/M2 | WEIGHT: 123 LBS | SYSTOLIC BLOOD PRESSURE: 117 MMHG | DIASTOLIC BLOOD PRESSURE: 62 MMHG | HEART RATE: 84 BPM

## 2024-10-25 LAB
GLUCOSE BLDC GLUCOMTR-MCNC: 138 MG/DL (ref 70–130)
GLUCOSE BLDC GLUCOMTR-MCNC: 151 MG/DL (ref 70–130)
GLUCOSE BLDC GLUCOMTR-MCNC: 162 MG/DL (ref 70–130)

## 2024-10-25 PROCEDURE — 82948 REAGENT STRIP/BLOOD GLUCOSE: CPT

## 2024-10-25 PROCEDURE — 63710000001 METHYLPREDNISOLONE 4 MG TABLET THERAPY PACK 21 EACH DISP PACK: Performed by: PHYSICIAN ASSISTANT

## 2024-10-25 PROCEDURE — 63710000001 INSULIN REGULAR HUMAN PER 5 UNITS: Performed by: INTERNAL MEDICINE

## 2024-10-25 PROCEDURE — 25010000002 ENOXAPARIN PER 10 MG: Performed by: INTERNAL MEDICINE

## 2024-10-25 PROCEDURE — 94799 UNLISTED PULMONARY SVC/PX: CPT

## 2024-10-25 RX ORDER — HYDROCODONE BITARTRATE AND ACETAMINOPHEN 5; 325 MG/1; MG/1
1 TABLET ORAL EVERY 12 HOURS PRN
Qty: 10 TABLET | Refills: 0 | Status: SHIPPED | OUTPATIENT
Start: 2024-10-25

## 2024-10-25 RX ORDER — METHYLPREDNISOLONE 4 MG/1
4 TABLET ORAL
Status: DISCONTINUED | OUTPATIENT
Start: 2024-10-27 | End: 2024-10-25 | Stop reason: HOSPADM

## 2024-10-25 RX ORDER — METFORMIN HYDROCHLORIDE 500 MG/1
500 TABLET, EXTENDED RELEASE ORAL 2 TIMES DAILY WITH MEALS
Qty: 60 TABLET | Refills: 3 | Status: SHIPPED | OUTPATIENT
Start: 2024-10-25

## 2024-10-25 RX ORDER — METHYLPREDNISOLONE 4 MG/1
4 TABLET ORAL
Status: DISCONTINUED | OUTPATIENT
Start: 2024-10-25 | End: 2024-10-25 | Stop reason: HOSPADM

## 2024-10-25 RX ORDER — CLOPIDOGREL BISULFATE 75 MG/1
75 TABLET ORAL DAILY
Qty: 30 TABLET | Refills: 3 | Status: SHIPPED | OUTPATIENT
Start: 2024-10-26

## 2024-10-25 RX ORDER — METHYLPREDNISOLONE 4 MG/1
4 TABLET ORAL
Status: DISCONTINUED | OUTPATIENT
Start: 2024-10-29 | End: 2024-10-25 | Stop reason: HOSPADM

## 2024-10-25 RX ORDER — FUROSEMIDE 40 MG/1
40 TABLET ORAL DAILY
Qty: 30 TABLET | Refills: 3 | Status: SHIPPED | OUTPATIENT
Start: 2024-10-26

## 2024-10-25 RX ORDER — METHYLPREDNISOLONE 4 MG/1
8 TABLET ORAL
Status: DISCONTINUED | OUTPATIENT
Start: 2024-10-26 | End: 2024-10-25 | Stop reason: HOSPADM

## 2024-10-25 RX ORDER — METHYLPREDNISOLONE 4 MG/1
4 TABLET ORAL
Status: DISCONTINUED | OUTPATIENT
Start: 2024-10-30 | End: 2024-10-25 | Stop reason: HOSPADM

## 2024-10-25 RX ORDER — METHYLPREDNISOLONE 4 MG/1
4 TABLET ORAL
Status: COMPLETED | OUTPATIENT
Start: 2024-10-25 | End: 2024-10-25

## 2024-10-25 RX ORDER — METOPROLOL SUCCINATE 50 MG/1
50 TABLET, EXTENDED RELEASE ORAL 2 TIMES DAILY
Qty: 60 TABLET | Refills: 3 | Status: SHIPPED | OUTPATIENT
Start: 2024-10-25

## 2024-10-25 RX ORDER — ASPIRIN 81 MG/1
81 TABLET, CHEWABLE ORAL DAILY
Qty: 30 TABLET | Refills: 3 | Status: SHIPPED | OUTPATIENT
Start: 2024-10-26

## 2024-10-25 RX ORDER — METHYLPREDNISOLONE 4 MG/1
4 TABLET ORAL
Status: DISCONTINUED | OUTPATIENT
Start: 2024-10-26 | End: 2024-10-25 | Stop reason: HOSPADM

## 2024-10-25 RX ORDER — METHYLPREDNISOLONE 4 MG/1
8 TABLET ORAL
Status: DISCONTINUED | OUTPATIENT
Start: 2024-10-25 | End: 2024-10-25 | Stop reason: HOSPADM

## 2024-10-25 RX ORDER — METHYLPREDNISOLONE 4 MG/1
4 TABLET ORAL
Status: DISCONTINUED | OUTPATIENT
Start: 2024-10-28 | End: 2024-10-25 | Stop reason: HOSPADM

## 2024-10-25 RX ORDER — PANTOPRAZOLE SODIUM 40 MG/1
40 TABLET, DELAYED RELEASE ORAL
Qty: 30 TABLET | Refills: 3 | Status: SHIPPED | OUTPATIENT
Start: 2024-10-26

## 2024-10-25 RX ORDER — METHYLPREDNISOLONE 4 MG/1
8 TABLET ORAL
Status: COMPLETED | OUTPATIENT
Start: 2024-10-25 | End: 2024-10-25

## 2024-10-25 RX ORDER — METHYLPREDNISOLONE 4 MG/1
TABLET ORAL
Qty: 21 TABLET | Refills: 0 | Status: SHIPPED | OUTPATIENT
Start: 2024-10-25

## 2024-10-25 RX ADMIN — INSULIN HUMAN 2 UNITS: 100 INJECTION, SOLUTION PARENTERAL at 12:57

## 2024-10-25 RX ADMIN — ATORVASTATIN CALCIUM 10 MG: 20 TABLET, FILM COATED ORAL at 08:43

## 2024-10-25 RX ADMIN — METFORMIN HYDROCHLORIDE 500 MG: 500 TABLET, EXTENDED RELEASE ORAL at 16:35

## 2024-10-25 RX ADMIN — ASPIRIN 81 MG: 81 TABLET, CHEWABLE ORAL at 08:43

## 2024-10-25 RX ADMIN — POLYETHYLENE GLYCOL 3350 17 G: 17 POWDER, FOR SOLUTION ORAL at 08:43

## 2024-10-25 RX ADMIN — FUROSEMIDE 40 MG: 40 TABLET ORAL at 08:43

## 2024-10-25 RX ADMIN — INSULIN HUMAN 2 UNITS: 100 INJECTION, SOLUTION PARENTERAL at 16:36

## 2024-10-25 RX ADMIN — CLOPIDOGREL BISULFATE 75 MG: 75 TABLET, FILM COATED ORAL at 08:43

## 2024-10-25 RX ADMIN — ENOXAPARIN SODIUM 40 MG: 100 INJECTION SUBCUTANEOUS at 14:32

## 2024-10-25 RX ADMIN — SACUBITRIL AND VALSARTAN 1 TABLET: 24; 26 TABLET, FILM COATED ORAL at 08:43

## 2024-10-25 RX ADMIN — METOPROLOL SUCCINATE 50 MG: 50 TABLET, FILM COATED, EXTENDED RELEASE ORAL at 08:43

## 2024-10-25 RX ADMIN — PANTOPRAZOLE SODIUM 40 MG: 40 TABLET, DELAYED RELEASE ORAL at 06:44

## 2024-10-25 RX ADMIN — METHYLPREDNISOLONE 8 MG: 4 TABLET ORAL at 12:57

## 2024-10-25 RX ADMIN — IPRATROPIUM BROMIDE AND ALBUTEROL SULFATE 3 ML: 2.5; .5 SOLUTION RESPIRATORY (INHALATION) at 10:23

## 2024-10-25 RX ADMIN — MEMANTINE HYDROCHLORIDE 10 MG: 10 TABLET, FILM COATED ORAL at 08:43

## 2024-10-25 RX ADMIN — METFORMIN HYDROCHLORIDE 500 MG: 500 TABLET, EXTENDED RELEASE ORAL at 08:43

## 2024-10-25 RX ADMIN — METHYLPREDNISOLONE 4 MG: 4 TABLET ORAL at 12:57

## 2024-10-25 RX ADMIN — EMPAGLIFLOZIN 10 MG: 10 TABLET, FILM COATED ORAL at 08:43

## 2024-10-25 RX ADMIN — SODIUM CHLORIDE TAB 1 GM 1 G: 1 TAB at 08:43

## 2024-10-25 NOTE — CONSULTS
Orthopaedic Consultation      Patient: Kecia Martinez    Date of Admission: 10/18/2024  5:01 PM    YOB: 1940    Medical Record Number: [id number]    Attending Physician:  Ministerio Armas MD    Consulting Physician:  Dr. Neil Gonsalez/Emory Landry PA-C      Chief Complaints: Left lower extremity pain and right knee pain    History of Present Illness:     The patient is a pleasant 84-year-old female.  She is a resident of a nursing home.  She has history of Alzheimer's dementia, chronic kidney disease, hypertension, hyperlipidemia, coronary artery disease with history of MI, peripheral vascular disease, diabetes mellitus type 2.  She developed acute onset of shortness of breath.  She was subsequently admitted to the hospital on October 19.    During her hospital course.  Patient has been refusing physical therapy as she is having lower extremity pain.  She elicits her pain to the left lower extremity.  X-rays of her left hip and her right knee were obtained during her hospital course.  Showing advanced degenerative changes of both the left hip as well as the right knee.  She states that her pain is mainly increased with moving the knee.  She denies any trauma or falls to the area.  She is never had any treatment for this.  Her pain is described as a dull ache.    Orthopedics was consulted for evaluation and management.    Allergies:   Allergies   Allergen Reactions    Penicillins Unknown - High Severity    Sulfa Antibiotics Unknown - High Severity    Sulfanilamide Unknown - High Severity       Medications:   Home Medications:  Medications Prior to Admission   Medication Sig Dispense Refill Last Dose/Taking    acetaminophen (TYLENOL) 500 MG tablet Take 1 tablet by mouth 2 (Two) Times a Day.   Taking    amLODIPine (NORVASC) 10 MG tablet Take 1 tablet by mouth Daily.   Taking    atorvastatin (LIPITOR) 20 MG tablet Take 0.5 tablets by mouth Daily.   Taking    donepezil (ARICEPT) 10 MG tablet Take 1 tablet  by mouth Every Night.   Taking    Dulaglutide (Trulicity) 3 MG/0.5ML solution pen-injector Inject 0.5 mL under the skin into the appropriate area as directed 1 (One) Time Per Week. Thurs   Taking    [] furosemide (LASIX) 10 MG/ML injection Inject 4 mL into the appropriate muscle as directed by prescriber 1 (One) Time.   Taking    Glucagon 3 MG/DOSE powder Administer 1 spray into the nostril(s) as directed by provider As Needed (hypoglycemia).   Taking As Needed    hydrALAZINE (APRESOLINE) 50 MG tablet Take 1 tablet by mouth 3 (Three) Times a Day.   Taking    Insulin Glargine (BASAGLAR KWIKPEN) 100 UNIT/ML injection pen Inject 14 Units under the skin into the appropriate area as directed Daily.   Taking    ipratropium-albuterol (DUO-NEB) 0.5-2.5 mg/3 ml nebulizer Take 3 mL by nebulization 2 (Two) Times a Day.   Taking    losartan (COZAAR) 100 MG tablet Take 1 tablet by mouth Daily.   Taking    meclizine (ANTIVERT) 12.5 MG tablet Take 1 tablet by mouth 2 (Two) Times a Day.   Taking    memantine (NAMENDA) 10 MG tablet Take 1 tablet by mouth 2 (Two) Times a Day.   Taking    metFORMIN (GLUCOPHAGE) 500 MG tablet Take 2 tablets by mouth 2 (Two) Times a Day With Meals.   Taking    metoprolol succinate XL (TOPROL-XL) 100 MG 24 hr tablet Take 1 tablet by mouth Daily.   Taking    polyethylene glycol (MIRALAX) 17 g packet Take 17 g by mouth 2 (Two) Times a Day.   Taking    sennosides-docusate (senna-docusate sodium) 8.6-50 MG per tablet Take 2 tablets by mouth Daily.   Taking    sodium chloride 1 g tablet Take 1 tablet by mouth 2 (Two) Times a Day.   Taking    glucagon, human recombinant, (GLUCAGEN DIAGNOSTIC) 1 MG injection Inject 1 mg under the skin into the appropriate area as directed 1 (One) Time.       loperamide (IMODIUM) 2 MG capsule Take 1 capsule by mouth 4 (Four) Times a Day As Needed for Diarrhea.       magnesium hydroxide (MILK OF MAGNESIA) 400 MG/5ML suspension Take 30 mL by mouth Daily As Needed for  Constipation.          Current Medications:  Scheduled Meds:aspirin, 81 mg, Oral, Daily  atorvastatin, 10 mg, Oral, Daily  clopidogrel, 75 mg, Oral, Daily  donepezil, 10 mg, Oral, Nightly  empagliflozin, 10 mg, Oral, Daily  enoxaparin, 40 mg, Subcutaneous, Q24H  furosemide, 40 mg, Oral, Daily  insulin regular, 2-7 Units, Subcutaneous, Q6H  ipratropium-albuterol, 3 mL, Nebulization, Q6H - RT  memantine, 10 mg, Oral, BID  metFORMIN ER, 500 mg, Oral, BID With Meals  methylPREDNISolone, 4 mg, Oral, After Lunch  methylPREDNISolone, 4 mg, Oral, After Dinner  [START ON 10/26/2024] methylPREDNISolone, 4 mg, Oral, TID Around Food  [START ON 10/27/2024] methylPREDNISolone, 4 mg, Oral, 4x Daily Taper  [START ON 10/28/2024] methylPREDNISolone, 4 mg, Oral, TID Around Food  [START ON 10/29/2024] methylPREDNISolone, 4 mg, Oral, Before Breakfast  [START ON 10/29/2024] methylPREDNISolone, 4 mg, Oral, Tonight  [START ON 10/30/2024] methylPREDNISolone, 4 mg, Oral, Before Breakfast  methylPREDNISolone, 8 mg, Oral, Before Breakfast  methylPREDNISolone, 8 mg, Oral, Tonight  [START ON 10/26/2024] methylPREDNISolone, 8 mg, Oral, Tonight  metoprolol succinate XL, 50 mg, Oral, BID  pantoprazole, 40 mg, Oral, Q AM  polyethylene glycol, 17 g, Oral, BID  sacubitril-valsartan, 1 tablet, Oral, Q12H  sodium chloride, 1 g, Oral, BID      Continuous Infusions:   PRN Meds:.  acetaminophen    dextrose    dextrose    glucagon (human recombinant)    HYDROcodone-acetaminophen    sodium chloride    [COMPLETED] Insert Peripheral IV **AND** sodium chloride    Past Medical History:   Diagnosis Date    Alzheimer disease     Myocardial infarct, old     Type 2 diabetes mellitus      History reviewed. No pertinent surgical history.  Social History     Occupational History    Not on file   Tobacco Use    Smoking status: Unknown     Passive exposure: Past    Smokeless tobacco: Not on file   Vaping Use    Vaping status: Unknown   Substance and Sexual Activity     Alcohol use: Defer    Drug use: Defer    Sexual activity: Defer      Social History     Social History Narrative    Not on file     History reviewed. No pertinent family history.      Review of Systems:   No other pertinent positives or negatives other than what is mentioned in the HPI and below.  Constitutional: Negative for fatigue, fever, or weight loss  HEENT: No active headache.  Pulmonary: Patient denies SOA.  Cardiovascular: Patient denies any chest pain.  Gastrointestinal:  Patient denies active vomiting or diarrhea.  Musculoskeletal: Positive for left hip and right knee pain.  Neurological: Patient denies active dizziness or loss of consciousness.  Skin: Patient denies any active bleeding.    Vital signs in last 24 hours:  Temp:  [97.5 °F (36.4 °C)-98.1 °F (36.7 °C)] 98 °F (36.7 °C)  Heart Rate:  [75-81] 79  Resp:  [16-18] 18  BP: (115-135)/(47-62) 135/61  Vitals:    10/24/24 1933 10/24/24 2232 10/25/24 0703 10/25/24 1023   BP: 120/47 121/57 135/61    BP Location: Right arm Right arm Right arm    Patient Position: Lying Lying Lying    Pulse: 78 78 81 79   Resp: 18 18 16 18   Temp: 98.1 °F (36.7 °C) 98 °F (36.7 °C) 98 °F (36.7 °C)    TempSrc: Oral Oral Oral    SpO2: 97% 96% 95% 99%   Weight:       Height:              Physical Exam: 84 y.o. female         General Appearance:  Alert, cooperative, in no acute distress    HEENT:    Atraumatic, Pupils are equal   Neck:   Cervical spine midline, no appreciable JVD   Lungs:     Breathing non-labored and chest rise symmetric    Heart:   Abdomen:     Rectal:       Extremities:   Pulses  Neurovascular:   Skin:   Musculoskeletal:      Pulse regular    Soft, Non-tender or distended    Deferred        No clubbing, cyanosis, or edema    Intact    Cranial nerves 2 - 12 grossly intact, sensation intact    No skin lesions  Focused physical examination of the patient's left lower extremity was performed.  Patient's resting comfortably in her hospital bed.  She is in no acute  distress.  No overlying skin changes.  No effusion.  No surrounding erythema.  She does have tenderness to palpation over the left hip on the lateral side.  As well as over the medial lateral joint line of the knee.  She has range of motion of the knee actively to about 90 degrees of knee flexion.  Extension to 0 degrees.  Crepitance on range of motion of her knee.  She is able to perform active hip flexion.  No pain with logroll.  Her knee is stable to ligamentous exam.  Compartments are soft.  Sensations intact distally.  Foot warm and well-perfused.    Examination of the patient's right knee was performed.  No overlying skin changes.  No knee effusion.  Tenderness over the medial and lateral joint lines.  Range of motion actively 0 to 90 degrees.  Knee is stable ligamentous exam.  Crepitation on range of motion.  Compartments are soft.  Sensations intact distally.  Foot warm well-perfused.     Diagnostic Tests:    Results from last 7 days   Lab Units 10/24/24  0424   WBC 10*3/mm3 11.70*   HEMOGLOBIN g/dL 11.4*   HEMATOCRIT % 35.6   PLATELETS 10*3/mm3 188     Results from last 7 days   Lab Units 10/24/24  0424   SODIUM mmol/L 139   POTASSIUM mmol/L 3.5   CHLORIDE mmol/L 97*   CO2 mmol/L 28.6   BUN mg/dL 24*   CREATININE mg/dL 1.04*   GLUCOSE mg/dL 163*   CALCIUM mg/dL 8.9         Study Result    Narrative & Impression   XR HIP W OR WO PELVIS 2-3 VIEW LEFT-, XR KNEE 1 OR 2 VW RIGHT-     Clinical: Left-sided hip pain, knee pain     AP pelvis left hip findings: Moderate to substantial left hip joint  degeneration. There is bone hypertrophy demonstrated about the greater  trochanter which can be seen with trochanteric bursitis. The left  hemipelvis is satisfactory in appearance. No avascular necrosis,  fracture or bone lesion seen. Vascular arterial calcifications noted  within the soft tissues.     There is a lesser degree of right hip joint degeneration noted.     CONCLUSION: Degenerative change as described  above.     Left knee findings: There is narrowing of the medial and lateral  compartments, near bone-on-bone configuration medial compartment. There  is periarticular bone hypertrophy along the medial and lateral  compartments. There is patellofemoral joint space loss with exuberant  periarticular bone hypertrophy at this location. There is patellar spur  formation. No joint effusion or fracture nor bone lesion seen. Vascular  arterial calcifications demonstrated within the soft tissues. Diffuse  bony demineralization.     CONCLUSION: Considerable right knee joint degeneration as described  above.     This report was finalized on 10/23/2024 7:44 PM by Dr. Adiel Mason M.D on Workstation: [id number]     Assessment:    Acute hypoxemic respiratory failure      Plan:      I did have an extensive discussion with the patient regards her situation in the hospital today.  I have reviewed the above.    Patient has complaints of left lower extremity pain as well as right knee pain.  Findings consistent with osteoarthritis of her left hip as well as her right knee on x-rays.  Her symptoms seem to be more multifactorial in nature.  She has had physical therapy ordered.  However due to her pain she has been refusing physical therapy.    At this time in regards to treatment options.  Could consider intra-articular corticosteroid injection into the left knee or the right knee.  However this would only be able to give her symptom relief of 1 joint.  Discussed with her that the majority of her symptoms seem to be more multifactorial in nature.  She is having pain down the left lower extremity.  She has osteoarthritis of her left hip as well as likely her left knee and her right knee.  Therefore since the symptoms are more multifactorial.  I think physical therapy would be beneficial for the patient for ambulation and gait training as well as strengthening.  Will also get her a knee brace that she can be placed to her left knee  since she has complaints of left knee as well.  We could prescribe the patient a Medrol Dosepak to help alleviate the symptoms of the lower extremity as well as the right knee pain.  I think this would help give her more symptom relief of all of the multifactorial complaints and orthopedic complaints.  This was communicated with the nursing staff and the hospitalist team is okay with the patient receiving a Medrol Dosepak.  Could also consider antiinflammatories.  However creatinine is slightly elevated and therefore would hold off on that at this time.    Therefore we will continue with this medication.  We will forego any injections at this time.  She will do the brace and physical therapy and the steroid pack.    We will sign off from orthopedic standpoint.  Please call if needed.  Thank you very much for allowing us to be a part of the patient's care.  All findings discussed with my supervising physician.    Patient can follow-up in our office on an outpatient basis once discharged for further evaluation and discussion of other treatment options.      Date: 10/25/2024  Emory Landry PA-C

## 2024-10-25 NOTE — DISCHARGE SUMMARY
PHYSICIAN DISCHARGE SUMMARY  [facility], [facility]    Patient Identification:    Name: Kecia Martinez  Age: 84 y.o.  Sex: female  :  1940  MRN: [id number]    Primary Care Physician: Ministerio Armas MD    Admit date: 10/18/2024    Discharge date and time:10/25/2024    Discharged Condition: fair    Discharge Diagnoses:    Acute hypoxemic respiratory failure  Non-ST elevation MI  Pulmonary edema  Leukocytosis,   Hypertension  Hyperlipidemia  Coronary artery disease, history MI  Peripheral vascular disease  Diabetes mellitus  Alzheimer's dementia  Left hip pain/left knee pain.  Trochanteric bursitis.  Left knee arthritis.  Constipation        Hospital Course: Kecia Martinez  is a 84-year-old female, resident of the nursing home.  Patient has history of Alzheimer dementia, chronic kidney disease, hypertension, hyperlipidemia, coronary artery disease with history MI, peripheral vascular disease, diabetes mellitus type 2.  The day of admission,, patient developed acute onset of shortness of breath, requiring 3 L of oxygen to obtain saturation of 90%.  Symptoms persisted and patient developed with chest congestion and wheezing so patient was sent to the emergency room.  In the ER, patient was found to have: WBC 17.06, hemoglobin 12.1, creatinine 1.02, sodium 136, potassium 5.0, BNP 14,133, troponin 1522.  Chest x-ray show vascular congestion with vague opacity in both lungs bases greater on the right than the left.  In the ER, patient was given DuoNeb, steroid, IV Lasix, aspirin, Lovenox.  Cardiology was consulted.  Patient was admitted for further management.  After receiving diuresis, patient improved.  This morning, patient is calm, is on 2 L nasal cannula and saturations %.  She has no chest pain or shortness of breath this morning.     Patient was diagnosed of having non-ST elevation MI causing acute respiratory failure.  Patient was continued on Aspirin, Lovenox,  Metoprolol, Statin.  Cardiology was consulted.  However, since x-ray could not exclude pneumonia in the right lower lobe, patient was also started on Rocephin.  Patient was placed on DVT and stress ulcer prophylaxis.  Over the next couple days patient improved.  By October 21, patient's saturation in room air was 100%, 2D echo results reviewed EF of 52.1% as well as hypokinesis of basal inferior and inferolateral walls, recommendation from cardiology was to treat conservatively since EF was preserved.  At this point, Jardiance as well as Entresto was added to the regimen.  A CT scan of chest was done was done and showed bilateral pleural effusion greater on the right side, diuretic was increased to 40 mg twice a day and this was adjusted a couple of days ago to 40 mg once a day since patient was euvolemic.  Final recommendation per cardiology was to continue medical therapy.  On October 23, patient complained of left hip and left knee pain, x-rays were done and show arthritis, orthopedics were consulted and they recommended patient to be on Medrol Dosepak.  Patient was placed on Norco as needed since the pain was intense, patient pain improved considerably today.  Orthopedics recommended Medrol Dosepak.  At this time, patient is stable enough to be discharged back to her skilled nursing facility.  Please see medication reconciliation list.    PMHX:   Past Medical History:   Diagnosis Date    Alzheimer disease     Myocardial infarct, old     Type 2 diabetes mellitus      PSHX: History reviewed. No pertinent surgical history.        Consults:     Consults       Date and Time Order Name Status Description    10/24/2024  8:50 AM Inpatient Orthopedic Surgery Consult Completed     10/19/2024  5:46 AM Inpatient Cardiology Consult      10/18/2024  6:01 PM Cardiology (on-call MD unless specified)      10/18/2024  5:56 PM IP General Consult (Use specialty-specific consult if known)              Discharge Exam:    /61  "(BP Location: Right arm, Patient Position: Lying)   Pulse 79   Temp 98 °F (36.7 °C) (Oral)   Resp 18   Ht 160 cm (63\")   Wt 55.8 kg (123 lb)   SpO2 99%   BMI 21.79 kg/m²     General: Alert, oriented x 1-2. Cooperative, no distress this morning, appears stated age  Neck: Supple, symmetrical, trachea midline, no adenopathy;              thyroid:  no enlargement/tenderness/nodules;              no carotid bruit or JVD  Cardiovascular: Tachycardic, regular rhythm and intact distal pulses.              Exam reveals no gallop and no friction rub. No murmur heard  Pulmonary: Decreased breath sounds bilaterally.  Few rhonchi at bases.  No wheezing, no rales.    Abdominal: Soft, nontender, bowel sounds active all four quadrants,     no masses, no hepatomegaly, no splenomegaly.   Extremities: Pain in the left hip and it trochanteric area as well as in the right knee, right about the patella.    Pulses: 2 + symmetric all extremities  Neurological: Patient is alert and oriented to person.  No new focal sensorimotor deficit.  Skin: Skin color, texture, normal. Turgor is decreased. No rashes or lesions          Data Review:        Results from last 7 days   Lab Units 10/24/24  0424 10/23/24  0548 10/21/24  0505   WBC 10*3/mm3 11.70* 11.46* 9.24   HEMOGLOBIN g/dL 11.4* 12.0 11.1*   HEMATOCRIT % 35.6 36.5 34.0   PLATELETS 10*3/mm3 188 202 175       Results from last 7 days   Lab Units 10/24/24  0424 10/23/24  0548 10/22/24  1358 10/21/24  0505 10/19/24  0548 10/18/24  1711   SODIUM mmol/L 139 134* 137 138   < > 136   POTASSIUM mmol/L 3.5 3.6 3.8 3.5   < > 5.0   CHLORIDE mmol/L 97* 96* 97* 102   < > 101   CO2 mmol/L 28.6 30.0* 28.1 30.0*   < > 19.4*   BUN mg/dL 24* 21 21 16   < > 24*   CREATININE mg/dL 1.04* 0.87 1.02* 0.77   < > 1.02*   CALCIUM mg/dL 8.9 8.6 8.4* 8.6   < > 9.7   BILIRUBIN mg/dL  --   --   --  0.3  --  0.4   ALK PHOS U/L  --   --   --  75  --  85   ALT (SGPT) U/L  --   --   --  11  --  12   AST (SGOT) U/L  -- "   --   --  26  --  91*   GLUCOSE mg/dL 163* 176* 187* 175*   < > 359*    < > = values in this interval not displayed.           Lab Results   Lab Value Date/Time    TROPONINT 3,974 (C) 10/19/2024 0548    TROPONINT 2,113 (C) 10/18/2024 2131    TROPONINT 1,522 (C) 10/18/2024 1711    TROPONINT 25 (H) 11/02/2023 1210    TROPONINT 24 (H) 11/02/2023 1011       Microbiology Results (last 10 days)       Procedure Component Value - Date/Time    CANDIDA AURIS SCREEN - Swab, Axilla Right, Axilla Left and Groin [[id number]]  (Normal) Collected: 10/21/24 1205    Lab Status: Preliminary result Specimen: Swab from Axilla Right, Axilla Left and Groin Updated: 10/25/24 1011     Candida Auris Screen Culture No Candida auris isolated at 4 days    Blood Culture - Blood, Arm, Left [[id number]]  (Normal) Collected: 10/18/24 2131    Lab Status: Final result Specimen: Blood from Arm, Left Updated: 10/23/24 2145     Blood Culture No growth at 5 days    Blood Culture - Blood, Arm, Right [[id number]]  (Normal) Collected: 10/18/24 2131    Lab Status: Final result Specimen: Blood from Arm, Right Updated: 10/23/24 2145     Blood Culture No growth at 5 days    Respiratory Panel PCR w/COVID-19(SARS-CoV-2) DAR/OUMAR/ANDREW/PAD/COR/ANUPAM In-House, NP Swab in UTM/VTM, 2 HR TAT - Swab, Nasopharynx [[id number]]  (Normal) Collected: 10/18/24 1711    Lab Status: Final result Specimen: Swab from Nasopharynx Updated: 10/18/24 1819     ADENOVIRUS, PCR Not Detected     Coronavirus 229E Not Detected     Coronavirus HKU1 Not Detected     Coronavirus NL63 Not Detected     Coronavirus OC43 Not Detected     COVID19 Not Detected     Human Metapneumovirus Not Detected     Human Rhinovirus/Enterovirus Not Detected     Influenza A PCR Not Detected     Influenza B PCR Not Detected     Parainfluenza Virus 1 Not Detected     Parainfluenza Virus 2 Not Detected     Parainfluenza Virus 3 Not Detected     Parainfluenza Virus 4 Not Detected     RSV, PCR Not Detected     Bordetella  pertussis pcr Not Detected     Bordetella parapertussis PCR Not Detected     Chlamydophila pneumoniae PCR Not Detected     Mycoplasma pneumo by PCR Not Detected    Narrative:      In the setting of a positive respiratory panel with a viral infection PLUS a negative procalcitonin without other underlying concern for bacterial infection, consider observing off antibiotics or discontinuation of antibiotics and continue supportive care. If the respiratory panel is positive for atypical bacterial infection (Bordetella pertussis, Chlamydophila pneumoniae, or Mycoplasma pneumoniae), consider antibiotic de-escalation to target atypical bacterial infection.             Imaging Results (All)       Procedure Component Value Units Date/Time    XR Knee 1 or 2 View Right [[id number]] Collected: 10/1940     Updated: 10/23/24 1947    Narrative:      XR HIP W OR WO PELVIS 2-3 VIEW LEFT-, XR KNEE 1 OR 2 VW RIGHT-     Clinical: Left-sided hip pain, knee pain     AP pelvis left hip findings: Moderate to substantial left hip joint  degeneration. There is bone hypertrophy demonstrated about the greater  trochanter which can be seen with trochanteric bursitis. The left  hemipelvis is satisfactory in appearance. No avascular necrosis,  fracture or bone lesion seen. Vascular arterial calcifications noted  within the soft tissues.     There is a lesser degree of right hip joint degeneration noted.     CONCLUSION: Degenerative change as described above.     Left knee findings: There is narrowing of the medial and lateral  compartments, near bone-on-bone configuration medial compartment. There  is periarticular bone hypertrophy along the medial and lateral  compartments. There is patellofemoral joint space loss with exuberant  periarticular bone hypertrophy at this location. There is patellar spur  formation. No joint effusion or fracture nor bone lesion seen. Vascular  arterial calcifications demonstrated within the soft tissues.  Diffuse  bony demineralization.     CONCLUSION: Considerable right knee joint degeneration as described  above.     This report was finalized on 10/23/2024 7:44 PM by Dr. Adiel Mason M.D on Workstation: [id number]       XR Hip With or Without Pelvis 2 - 3 View Left [[id number]] Collected: 10/1940     Updated: 10/23/24 1947    Narrative:      XR HIP W OR WO PELVIS 2-3 VIEW LEFT-, XR KNEE 1 OR 2 VW RIGHT-     Clinical: Left-sided hip pain, knee pain     AP pelvis left hip findings: Moderate to substantial left hip joint  degeneration. There is bone hypertrophy demonstrated about the greater  trochanter which can be seen with trochanteric bursitis. The left  hemipelvis is satisfactory in appearance. No avascular necrosis,  fracture or bone lesion seen. Vascular arterial calcifications noted  within the soft tissues.     There is a lesser degree of right hip joint degeneration noted.     CONCLUSION: Degenerative change as described above.     Left knee findings: There is narrowing of the medial and lateral  compartments, near bone-on-bone configuration medial compartment. There  is periarticular bone hypertrophy along the medial and lateral  compartments. There is patellofemoral joint space loss with exuberant  periarticular bone hypertrophy at this location. There is patellar spur  formation. No joint effusion or fracture nor bone lesion seen. Vascular  arterial calcifications demonstrated within the soft tissues. Diffuse  bony demineralization.     CONCLUSION: Considerable right knee joint degeneration as described  above.     This report was finalized on 10/23/2024 7:44 PM by Dr. Adiel Mason M.D on Workstation: [id number]       CT Chest Without Contrast Diagnostic [[id number]] Collected: 10/20/24 1621     Updated: 10/20/24 1628    Narrative:      CT CHEST WO CONTRAST DIAGNOSTIC-     Radiation dose reduction techniques were utilized, including automated  exposure control and exposure modulation based  on body size.           FINDINGS:  1. The esophagus is satisfactory in course and caliber. Heart size  within normal limits. Heavy coronary artery calcification.  Atherosclerotic calcification of a normal diameter aorta.     2. Moderate-sized bilateral pleural effusions, free-flowing, size on the  right greater than the left. Consolidation/atelectasis at the base of  both lower lobes, likewise greater on the right than the left. Patchy  areas of groundglass infiltrate demonstrated within the right and left  lower lobes as well as the right upper lobe.     3. 6 mm noncalcified nodular density right upper lobe on image #36.  Adjacent similar 3 mm nodule seen on image 32. This could be  infectious/inflammatory or neoplastic. Short-term follow-up recommended  in 6 months.     4. There is some fullness within the right hilum, cannot exclude mildly  enlarged lymph nodes. There is an enlarged precarinal lymph node within  the mediastinum measuring 11 mm.     5. Limited images through the upper abdomen are unremarkable. No acute  osseous abnormality. Remainder is unremarkable.              This report was finalized on 10/20/2024 4:25 PM by Dr. Adiel Mason M.D on Workstation: [id number]       XR Chest 1 View [[id number]] Collected: 10/19/24 1550     Updated: 10/19/24 1555    Narrative:      XR CHEST 1 VW-     HISTORY: Female who is 84 years-old, short of breath     TECHNIQUE: Frontal views of the chest     COMPARISON: 10/18/2024     FINDINGS: The heart size is normal. Aorta is calcified. Pulmonary  vasculature is slightly congested. Increased opacity at the right mid to  lower lung suggests increased atelectasis/infiltrate/pleural effusion.  Persistent left basilar atelectasis/infiltrate/effusion. No  pneumothorax. No acute osseous process.       Impression:      Persistent right more than left opacities, mildly increased  on the right.     This report was finalized on 10/19/2024 3:51 PM by Dr. Javed Terrell M.D on  Workstation: [id number]       XR Chest 1 View [[id number]] Collected: 10/18/24 1746     Updated: 10/18/24 1750    Narrative:      XR CHEST 1 VW-     Clinical: Shortness of breath     FINDINGS: Cardiac size within normal limits, there is atherosclerotic  calcification of the aorta. There is vascular congestion with vague  opacity at both lung bases, greater on the right than the left, likely  associated pleural effusion effusions. There is bibasilar atelectasis  and/or infiltrate, pneumonia not excluded. Remainder is unremarkable.     This report was finalized on 10/18/2024 5:47 PM by Dr. Adiel Mason M.D on Workstation: [id number]                 Disposition:    Skilled nursing facility    Patient Instructions:        Discharge Medications        New Medications        Instructions Start Date   aspirin 81 MG chewable tablet   81 mg, Oral, Daily   Start Date: October 26, 2024     clopidogrel 75 MG tablet  Commonly known as: PLAVIX   75 mg, Oral, Daily   Start Date: October 26, 2024     empagliflozin 10 MG tablet tablet  Commonly known as: JARDIANCE   10 mg, Oral, Daily   Start Date: October 26, 2024     furosemide 40 MG tablet  Commonly known as: LASIX  Replaces: furosemide 10 MG/ML injection   40 mg, Oral, Daily   Start Date: October 26, 2024     HYDROcodone-acetaminophen 5-325 MG per tablet  Commonly known as: NORCO   1 tablet, Oral, Every 12 Hours PRN      metFORMIN  MG 24 hr tablet  Commonly known as: GLUCOPHAGE-XR  Replaces: metFORMIN 500 MG tablet   500 mg, Oral, 2 Times Daily With Meals      methylPREDNISolone 4 MG dose pack  Commonly known as: MEDROL   Take as directed on package instructions.      pantoprazole 40 MG EC tablet  Commonly known as: PROTONIX   40 mg, Oral, Every Early Morning   Start Date: October 26, 2024     sacubitril-valsartan 24-26 MG tablet  Commonly known as: ENTRESTO   1 tablet, Oral, Every 12 Hours Scheduled             Changes to Medications        Instructions Start Date    metoprolol succinate XL 50 MG 24 hr tablet  Commonly known as: TOPROL-XL  What changed:   medication strength  how much to take  when to take this   50 mg, Oral, 2 Times Daily             Continue These Medications        Instructions Start Date   acetaminophen 500 MG tablet  Commonly known as: TYLENOL   500 mg, 2 Times Daily      atorvastatin 20 MG tablet  Commonly known as: LIPITOR   10 mg, Daily      donepezil 10 MG tablet  Commonly known as: ARICEPT   10 mg, Nightly      glucagon (human recombinant) 1 MG injection  Commonly known as: GLUCAGEN DIAGNOSTIC   1 mg, Subcutaneous, Once      Glucagon 3 MG/DOSE powder   1 spray, As Needed      ipratropium-albuterol 0.5-2.5 mg/3 ml nebulizer  Commonly known as: DUO-NEB   3 mL, 2 Times Daily      memantine 10 MG tablet  Commonly known as: NAMENDA   10 mg, 2 Times Daily      polyethylene glycol 17 g packet  Commonly known as: MIRALAX   17 g, 2 Times Daily      sodium chloride 1 g tablet   1 g, 2 Times Daily             Stop These Medications      amLODIPine 10 MG tablet  Commonly known as: NORVASC     BASAGLAR KWIKPEN 100 UNIT/ML injection pen     furosemide 10 MG/ML injection  Commonly known as: LASIX  Replaced by: furosemide 40 MG tablet     hydrALAZINE 50 MG tablet  Commonly known as: APRESOLINE     loperamide 2 MG capsule  Commonly known as: IMODIUM     losartan 100 MG tablet  Commonly known as: COZAAR     magnesium hydroxide 400 MG/5ML suspension  Commonly known as: MILK OF MAGNESIA     meclizine 12.5 MG tablet  Commonly known as: ANTIVERT     metFORMIN 500 MG tablet  Commonly known as: GLUCOPHAGE  Replaced by: metFORMIN  MG 24 hr tablet     sennosides-docusate 8.6-50 MG per tablet  Commonly known as: PERICOLACE     Trulicity 3 MG/0.5ML solution pen-injector  Generic drug: Dulaglutide              Discharge Order (From admission, onward)       Start     Ordered    10/25/24 1048  Discharge patient  Once        Expected Discharge Date: 10/25/24   Expected  Discharge Time: Afternoon   Discharge Disposition: Rehab Facility or Unit (DC - External)   Physician of Record for Attribution - Please select from Treatment Team: ENDER BRODY [[id number]]   Review needed by CMO to determine Physician of Record: No      Question Answer Comment   Physician of Record for Attribution - Please select from Treatment Team ENDER BRODY    Review needed by CMO to determine Physician of Record No        10/25/24 3934                     Follow-up Information       Jr Gonsalez MD Follow up in 2 week(s).    Specialty: Orthopedic Surgery  Contact information:  [address]  [place] [address]  [phone]               [facility] Follow up.    Specialty: Skilled Nursing Facility  Contact information:  [address] [place] [address]  [phone]             Ender Brody MD .    Specialties: Internal Medicine, Hospitalist  Contact information:  [address]  [place] [address]  [phone]                             Total time spent discharging patient including evaluation,post hospitalization follow up,  medication and post hospitalization instructions and education total time exceeds 30 minutes.    Signed:  Ender Brody MD  10/25/2024  11:07 EDT       I wore protective equipment throughout this patient encounter including a face mask, gloves, and protective eyewear.  Hand hygiene was performed before donning protective equipment and after removal when leaving the room.

## 2024-10-25 NOTE — CASE MANAGEMENT/SOCIAL WORK
Continued Stay Note  [facility]     Patient Name: Kecia Martinez  MRN: [id number]  Today's Date: 10/25/2024    Admit Date: 10/18/2024    Plan: [facility] level of care via [facility] at 5PM today, 10/25/24   Discharge Plan       Row Name 10/25/24 1114       Plan    Plan [facility] level of care via [facility] at 5PM today, 10/25/24    Plan Comments CCP called [facility] and she advised she has reviewed Zi Ramos and wishes for Pt to RETURN TO [facility] at discharge.  CCP made D[facility] aware that Pt will d/c this afternoon.  CCP called and spoke with [facility] liaison to advise.  CCP confirmed with [facility] that Pt transport is scheduled for 5PM this evening, 10/25.  Nursing Home pharmacy loaded in [place] ([facility]).  CCP following.............Fabiola POST/IRA                   Discharge Codes    No documentation.                 Expected Discharge Date and Time       Expected Discharge Date Expected Discharge Time    Oct 25, 2024               Fabiola Nava RN

## 2024-10-25 NOTE — PLAN OF CARE
Goal Outcome Evaluation:  Plan of Care Reviewed With: patient        Progress: improving  Outcome Evaluation: Patient  resting  at this  time.    Norco  x  1  for  complaints  of  left   Knee   pain.   Ortho    to  see in  the  am.       Continue    oral  antibiotics.    Room  air.  SR  on  the monitor.  Nursing  will  continue to monitor.

## 2024-10-26 LAB — BACTERIA ISLT: NORMAL

## 2024-10-26 NOTE — CASE MANAGEMENT/SOCIAL WORK
Case Management Discharge Note      Final Note: DC to SNF    Provided Post Acute Provider List?: N/A  N/A Provider List Comment: pt has a state guardian in LTC; VM left for guardian Radha Johnson [phone]    Selected Continued Care - Discharged on 10/25/2024 Admission date: 10/18/2024 - Discharge disposition: Rehab Facility or Unit (DC - External)      Destination Coordination complete.      Service Provider Services Address Phone Fax Patient Preferred    [facility] [place] [address] [phone] --              Durable Medical Equipment    No services have been selected for the patient.                Dialysis/Infusion    No services have been selected for the patient.                Home Medical Care    No services have been selected for the patient.                Therapy    No services have been selected for the patient.                Community Resources    No services have been selected for the patient.                Community & DME    No services have been selected for the patient.                         Final Discharge Disposition Code: 03 - skilled nursing facility (SNF)

## 2024-11-01 NOTE — PROGRESS NOTES
"Enter Query Response Below      Query Response: Acute cardiogenic pulmonary edema              If applicable, please update the problem list.     Patient: Kecia Martinez        : 1940  Account: [id number]           Admit Date:         How to Respond to this query:       a. Click New Note     b. Answer query within the yellow box.                c. Update the Problem List, if applicable.      If you have any questions about this query contact me at: [email or web address]     ,    84 year old female admitted 10/18 with NSTEMI diagnosed with pulmonary edema per the discharge summary.   Cardiology note 10/20 documented , \"CC:  Congestive heart failure...cardiogenic pulmonary edema\"   Ejection fraction 52.1% per echocardiogram 10/19.   Treatment: IV lasix 10/18-10/19, oral lasix beginning 10/21    Please clarify if the patient was treated/monitored for:    Acute diastolic heart failure  Acute cardiogenic pulmonary edema  Other- specify______    By submitting this query, we are merely seeking further clarification of documentation to accurately reflect all conditions that you are monitoring, evaluating, treating or that extend the hospitalization or utilize additional resources of care. Please utilize your independent clinical judgment when addressing the question(s) above.     This query and your response, once completed, will be entered into the legal medical record.    Sincerely,  Pretty Vega RN CCDS  Clinical Documentation Integrity Program     "

## 2024-11-16 NOTE — PROGRESS NOTES
"Enter Query Response Below      Query Response: Acute hypoxic respiratory failure was not supported              If applicable, please update the problem list.     Patient: Kecia Martinez        : 1940  Account: [id number]           Admit Date:         How to Respond to this query:       a. Click New Note     b. Answer query within the yellow box.                c. Update the Problem List, if applicable.      If you have any questions about this query contact me at: [email or web address]     ,    84 year old female admitted 10/18 with NSTEMI and acute hypoxemic respiratory failure per H&P.  Per the ED note, \"normal effort, speaks in full sentences, bilateral wheezing.\" Per the H&P, \"Decreased breath sounds bilaterally.  Few rhonchi at bases.  No wheezing, no rales.\" Per Cardiology 10/19, \" Clear to auscultation, respirations regular, even and unlabored.\"  O2 sat 88-93% on room air. The patient was treated with supplemental oxygen at 3 LPM maintaining O2 sat %. Weaned to room air on 10/20 with no drop in O2 sat.      After study, was acute hypoxic respiratory failure clinically supported during this admission?    Acute hypoxic respiratory failure was supported with additional clinical indicators:____________  Acute hypoxic respiratory failure was not supported  Other- specify_____________  Unable to determine        By submitting this query, we are merely seeking further clarification of documentation to accurately reflect all conditions that you are monitoring, evaluating, treating or that extend the hospitalization or utilize additional resources of care. Please utilize your independent clinical judgment when addressing the question(s) above.     This query and your response, once completed, will be entered into the legal medical record.    Sincerely,  Pretty Vega RN CCDS  Clinical Documentation Integrity Program     "

## 2025-08-30 ENCOUNTER — ANESTHESIA (OUTPATIENT)
Dept: PERIOP | Facility: HOSPITAL | Age: 85
End: 2025-08-30
Payer: MEDICARE

## 2025-08-30 ENCOUNTER — ANESTHESIA EVENT (OUTPATIENT)
Dept: PERIOP | Facility: HOSPITAL | Age: 85
End: 2025-08-30
Payer: MEDICARE

## 2025-08-30 PROBLEM — M72.6 NECROTIZING FASCIITIS: Status: ACTIVE | Noted: 2025-08-30

## 2025-08-31 PROBLEM — M72.6 NECROTIZING FASCIITIS: Status: RESOLVED | Noted: 2025-08-30 | Resolved: 2025-08-31

## 2025-08-31 PROCEDURE — 25010000002 FENTANYL CITRATE (PF) 50 MCG/ML SOLUTION: Performed by: NURSE ANESTHETIST, CERTIFIED REGISTERED

## 2025-08-31 PROCEDURE — 25010000002 LIDOCAINE 2% SOLUTION: Performed by: NURSE ANESTHETIST, CERTIFIED REGISTERED

## 2025-08-31 PROCEDURE — P9041 ALBUMIN (HUMAN),5%, 50ML: HCPCS | Performed by: NURSE ANESTHETIST, CERTIFIED REGISTERED

## 2025-08-31 PROCEDURE — 25010000002 PROPOFOL 10 MG/ML EMULSION: Performed by: NURSE ANESTHETIST, CERTIFIED REGISTERED

## 2025-08-31 PROCEDURE — 25010000002 PHENYLEPHRINE 10 MG/ML SOLUTION: Performed by: NURSE ANESTHETIST, CERTIFIED REGISTERED

## 2025-08-31 PROCEDURE — 25010000002 ALBUMIN HUMAN 5% PER 50 ML: Performed by: NURSE ANESTHETIST, CERTIFIED REGISTERED

## 2025-08-31 PROCEDURE — 25010000002 SUGAMMADEX 200 MG/2ML SOLUTION: Performed by: NURSE ANESTHETIST, CERTIFIED REGISTERED

## 2025-08-31 PROCEDURE — 25810000003 LACTATED RINGERS PER 1000 ML: Performed by: ANESTHESIOLOGY

## 2025-08-31 PROCEDURE — 25010000002 ONDANSETRON PER 1 MG: Performed by: NURSE ANESTHETIST, CERTIFIED REGISTERED

## 2025-08-31 PROCEDURE — 25010000002 HYDROMORPHONE 1 MG/ML SOLUTION: Performed by: NURSE ANESTHETIST, CERTIFIED REGISTERED

## 2025-08-31 RX ORDER — PROPOFOL 10 MG/ML
VIAL (ML) INTRAVENOUS AS NEEDED
Status: DISCONTINUED | OUTPATIENT
Start: 2025-08-31 | End: 2025-08-31 | Stop reason: SURG

## 2025-08-31 RX ORDER — ALBUMIN HUMAN 50 G/1000ML
SOLUTION INTRAVENOUS CONTINUOUS PRN
Status: DISCONTINUED | OUTPATIENT
Start: 2025-08-31 | End: 2025-08-31 | Stop reason: SURG

## 2025-08-31 RX ORDER — LIDOCAINE HYDROCHLORIDE 20 MG/ML
INJECTION, SOLUTION INFILTRATION; PERINEURAL AS NEEDED
Status: DISCONTINUED | OUTPATIENT
Start: 2025-08-31 | End: 2025-08-31 | Stop reason: SURG

## 2025-08-31 RX ORDER — PHENYLEPHRINE HYDROCHLORIDE 10 MG/ML
INJECTION INTRAVENOUS AS NEEDED
Status: DISCONTINUED | OUTPATIENT
Start: 2025-08-31 | End: 2025-08-31 | Stop reason: SURG

## 2025-08-31 RX ORDER — FENTANYL CITRATE 50 UG/ML
INJECTION, SOLUTION INTRAMUSCULAR; INTRAVENOUS AS NEEDED
Status: DISCONTINUED | OUTPATIENT
Start: 2025-08-31 | End: 2025-08-31 | Stop reason: SURG

## 2025-08-31 RX ORDER — ONDANSETRON 2 MG/ML
INJECTION INTRAMUSCULAR; INTRAVENOUS AS NEEDED
Status: DISCONTINUED | OUTPATIENT
Start: 2025-08-31 | End: 2025-08-31 | Stop reason: SURG

## 2025-08-31 RX ORDER — ROCURONIUM BROMIDE 10 MG/ML
INJECTION, SOLUTION INTRAVENOUS AS NEEDED
Status: DISCONTINUED | OUTPATIENT
Start: 2025-08-31 | End: 2025-08-31 | Stop reason: SURG

## 2025-08-31 RX ADMIN — FENTANYL CITRATE 25 MCG: 50 INJECTION, SOLUTION INTRAMUSCULAR; INTRAVENOUS at 00:32

## 2025-08-31 RX ADMIN — SUGAMMADEX 140 MG: 100 INJECTION, SOLUTION INTRAVENOUS at 01:02

## 2025-08-31 RX ADMIN — ROCURONIUM BROMIDE 50 MG: 10 INJECTION, SOLUTION INTRAVENOUS at 00:16

## 2025-08-31 RX ADMIN — PROPOFOL 100 MG: 10 INJECTION, EMULSION INTRAVENOUS at 00:16

## 2025-08-31 RX ADMIN — ONDANSETRON 4 MG: 2 INJECTION INTRAMUSCULAR; INTRAVENOUS at 00:58

## 2025-08-31 RX ADMIN — HYDROMORPHONE HYDROCHLORIDE 0.25 MG: 1 INJECTION, SOLUTION INTRAMUSCULAR; INTRAVENOUS; SUBCUTANEOUS at 01:05

## 2025-08-31 RX ADMIN — LIDOCAINE HYDROCHLORIDE 100 MG: 20 INJECTION, SOLUTION INFILTRATION; PERINEURAL at 00:16

## 2025-08-31 RX ADMIN — FENTANYL CITRATE 25 MCG: 50 INJECTION, SOLUTION INTRAMUSCULAR; INTRAVENOUS at 00:35

## 2025-08-31 RX ADMIN — PHENYLEPHRINE HYDROCHLORIDE 100 MCG: 10 INJECTION INTRAVENOUS at 00:22

## 2025-08-31 RX ADMIN — HYDROMORPHONE HYDROCHLORIDE 0.25 MG: 1 INJECTION, SOLUTION INTRAMUSCULAR; INTRAVENOUS; SUBCUTANEOUS at 01:02

## 2025-08-31 RX ADMIN — ALBUMIN (HUMAN): 12.5 INJECTION, SOLUTION INTRAVENOUS at 00:35

## 2025-08-31 RX ADMIN — SODIUM CHLORIDE, POTASSIUM CHLORIDE, SODIUM LACTATE AND CALCIUM CHLORIDE: 600; 310; 30; 20 INJECTION, SOLUTION INTRAVENOUS at 00:09
